# Patient Record
Sex: FEMALE | Race: WHITE | NOT HISPANIC OR LATINO | Employment: FULL TIME | URBAN - NONMETROPOLITAN AREA
[De-identification: names, ages, dates, MRNs, and addresses within clinical notes are randomized per-mention and may not be internally consistent; named-entity substitution may affect disease eponyms.]

---

## 2018-10-18 ENCOUNTER — OFFICE VISIT CONVERTED (OUTPATIENT)
Dept: FAMILY MEDICINE CLINIC | Age: 53
End: 2018-10-18
Attending: FAMILY MEDICINE

## 2018-11-01 ENCOUNTER — OFFICE VISIT CONVERTED (OUTPATIENT)
Dept: FAMILY MEDICINE CLINIC | Age: 53
End: 2018-11-01
Attending: FAMILY MEDICINE

## 2018-11-14 ENCOUNTER — OFFICE VISIT CONVERTED (OUTPATIENT)
Dept: FAMILY MEDICINE CLINIC | Age: 53
End: 2018-11-14
Attending: FAMILY MEDICINE

## 2019-03-12 ENCOUNTER — OFFICE VISIT CONVERTED (OUTPATIENT)
Dept: FAMILY MEDICINE CLINIC | Age: 54
End: 2019-03-12
Attending: NURSE PRACTITIONER

## 2019-03-12 ENCOUNTER — HOSPITAL ENCOUNTER (OUTPATIENT)
Dept: OTHER | Facility: HOSPITAL | Age: 54
Discharge: HOME | End: 2019-03-12
Attending: NURSE PRACTITIONER

## 2019-03-21 ENCOUNTER — OFFICE VISIT CONVERTED (OUTPATIENT)
Dept: PODIATRY | Facility: CLINIC | Age: 54
End: 2019-03-21
Attending: PODIATRIST

## 2019-05-17 ENCOUNTER — OFFICE VISIT CONVERTED (OUTPATIENT)
Dept: FAMILY MEDICINE CLINIC | Age: 54
End: 2019-05-17
Attending: NURSE PRACTITIONER

## 2019-06-12 ENCOUNTER — HOSPITAL ENCOUNTER (OUTPATIENT)
Dept: OTHER | Facility: HOSPITAL | Age: 54
Discharge: HOME | End: 2019-06-12
Attending: FAMILY MEDICINE

## 2019-06-12 LAB
CHOLEST SERPL-MCNC: 166 MG/DL (ref 107–200)
CHOLEST/HDLC SERPL: 3.3 {RATIO} (ref 3–6)
HDLC SERPL-MCNC: 50 MG/DL (ref 40–60)
LDLC SERPL CALC-MCNC: 93 MG/DL (ref 70–100)
TRIGL SERPL-MCNC: 114 MG/DL (ref 40–150)
VLDLC SERPL-MCNC: 23 MG/DL (ref 5–37)

## 2019-09-24 ENCOUNTER — OFFICE VISIT CONVERTED (OUTPATIENT)
Dept: FAMILY MEDICINE CLINIC | Age: 54
End: 2019-09-24
Attending: NURSE PRACTITIONER

## 2020-01-15 ENCOUNTER — OFFICE VISIT CONVERTED (OUTPATIENT)
Dept: FAMILY MEDICINE CLINIC | Age: 55
End: 2020-01-15
Attending: FAMILY MEDICINE

## 2020-01-15 ENCOUNTER — HOSPITAL ENCOUNTER (OUTPATIENT)
Dept: OTHER | Facility: HOSPITAL | Age: 55
Discharge: HOME | End: 2020-01-15
Attending: FAMILY MEDICINE

## 2020-01-15 LAB
APPEARANCE UR: CLEAR
BACTERIA UR QL AUTO: ABNORMAL
BILIRUB UR QL: NEGATIVE
CASTS URNS QL MICRO: ABNORMAL /[LPF]
COLOR UR: ABNORMAL
CONV LEUKOCYTE ESTERASE: NEGATIVE
CONV UROBILINOGEN IN URINE BY AUTOMATED TEST STRIP: 0.2 {EHRLICHU}/DL (ref 0.1–1)
EPI CELLS #/AREA URNS HPF: ABNORMAL /[HPF]
GLUCOSE 24H UR-MCNC: NEGATIVE MG/DL
HGB UR QL STRIP: NEGATIVE
KETONES UR QL STRIP: NEGATIVE MG/DL
MUCOUS THREADS URNS QL MICRO: ABNORMAL
NITRITE UR-MCNC: NEGATIVE MG/ML
PH UR STRIP.AUTO: 5.5 [PH] (ref 5–8)
PROT UR-MCNC: NEGATIVE MG/DL
RBC # BLD AUTO: ABNORMAL /[HPF]
SP GR UR STRIP: 1.02 (ref 1–1.03)
SPECIMEN SOURCE: ABNORMAL
UNIDENT CRYS URNS QL MICRO: ABNORMAL /[HPF]
WBC #/AREA URNS HPF: ABNORMAL /[HPF]

## 2020-01-31 ENCOUNTER — OFFICE VISIT CONVERTED (OUTPATIENT)
Dept: FAMILY MEDICINE CLINIC | Age: 55
End: 2020-01-31
Attending: NURSE PRACTITIONER

## 2020-11-25 ENCOUNTER — OFFICE VISIT CONVERTED (OUTPATIENT)
Dept: FAMILY MEDICINE CLINIC | Age: 55
End: 2020-11-25
Attending: NURSE PRACTITIONER

## 2020-11-25 ENCOUNTER — HOSPITAL ENCOUNTER (OUTPATIENT)
Dept: OTHER | Facility: HOSPITAL | Age: 55
Discharge: HOME | End: 2020-11-25
Attending: NURSE PRACTITIONER

## 2020-11-25 LAB
ALBUMIN SERPL-MCNC: 4.2 G/DL (ref 3.5–5)
ALBUMIN/GLOB SERPL: 1.4 {RATIO} (ref 1.4–2.6)
ALP SERPL-CCNC: 66 U/L (ref 53–141)
ALT SERPL-CCNC: 17 U/L (ref 10–40)
ANION GAP SERPL CALC-SCNC: 14 MMOL/L (ref 8–19)
AST SERPL-CCNC: 22 U/L (ref 15–50)
BASOPHILS # BLD MANUAL: 0.09 10*3/UL (ref 0–0.2)
BASOPHILS NFR BLD MANUAL: 1.3 % (ref 0–3)
BILIRUB SERPL-MCNC: 1.03 MG/DL (ref 0.2–1.3)
BUN SERPL-MCNC: 11 MG/DL (ref 5–25)
BUN/CREAT SERPL: 12 {RATIO} (ref 6–20)
CALCIUM SERPL-MCNC: 9.4 MG/DL (ref 8.7–10.4)
CHLORIDE SERPL-SCNC: 105 MMOL/L (ref 99–111)
CHOLEST SERPL-MCNC: 221 MG/DL (ref 107–200)
CHOLEST/HDLC SERPL: 4.2 {RATIO} (ref 3–6)
CONV CO2: 26 MMOL/L (ref 22–32)
CONV TOTAL PROTEIN: 7.2 G/DL (ref 6.3–8.2)
CREAT UR-MCNC: 0.89 MG/DL (ref 0.5–0.9)
DEPRECATED RDW RBC AUTO: 43.7 FL
EOSINOPHIL # BLD MANUAL: 0.32 10*3/UL (ref 0–0.7)
EOSINOPHIL NFR BLD MANUAL: 4.5 % (ref 0–7)
ERYTHROCYTE [DISTWIDTH] IN BLOOD BY AUTOMATED COUNT: 13.2 % (ref 11.5–14.5)
GFR SERPLBLD BASED ON 1.73 SQ M-ARVRAT: >60 ML/MIN/{1.73_M2}
GLOBULIN UR ELPH-MCNC: 3 G/DL (ref 2–3.5)
GLUCOSE SERPL-MCNC: 92 MG/DL (ref 65–99)
GRANS (ABSOLUTE): 3.52 10*3/UL (ref 2–8)
GRANS: 49.7 % (ref 30–85)
HBA1C MFR BLD: 14.5 G/DL (ref 12–16)
HCT VFR BLD AUTO: 43.6 % (ref 37–47)
HDLC SERPL-MCNC: 53 MG/DL (ref 40–60)
IMM GRANULOCYTES # BLD: 0 10*3/UL (ref 0–0.54)
IMM GRANULOCYTES NFR BLD: 0 % (ref 0–0.43)
LDLC SERPL CALC-MCNC: 145 MG/DL (ref 70–100)
LYMPHOCYTES # BLD MANUAL: 2.57 10*3/UL (ref 1–5)
LYMPHOCYTES NFR BLD MANUAL: 8.2 % (ref 3–10)
MCH RBC QN AUTO: 29.7 PG (ref 27–31)
MCHC RBC AUTO-ENTMCNC: 33.3 G/DL (ref 33–37)
MCV RBC AUTO: 89.3 FL (ref 81–99)
MONOCYTES # BLD AUTO: 0.58 10*3/UL (ref 0.2–1.2)
OSMOLALITY SERPL CALC.SUM OF ELEC: 291 MOSM/KG (ref 273–304)
PLATELET # BLD AUTO: 305 10*3/UL (ref 130–400)
PMV BLD AUTO: 9.8 FL (ref 7.4–10.4)
POTASSIUM SERPL-SCNC: 4.3 MMOL/L (ref 3.5–5.3)
RBC # BLD AUTO: 4.88 10*6/UL (ref 4.2–5.4)
SODIUM SERPL-SCNC: 141 MMOL/L (ref 135–147)
TRIGL SERPL-MCNC: 114 MG/DL (ref 40–150)
TSH SERPL-ACNC: 1.1 M[IU]/L (ref 0.27–4.2)
VARIANT LYMPHS NFR BLD MANUAL: 36.3 % (ref 20–45)
VLDLC SERPL-MCNC: 23 MG/DL (ref 5–37)
WBC # BLD AUTO: 7.08 10*3/UL (ref 4.8–10.8)

## 2020-11-26 LAB — HCV AB SER DONR QL: <0.1 S/CO RATIO (ref 0–0.9)

## 2020-12-09 ENCOUNTER — OFFICE VISIT CONVERTED (OUTPATIENT)
Dept: FAMILY MEDICINE CLINIC | Age: 55
End: 2020-12-09
Attending: NURSE PRACTITIONER

## 2020-12-09 ENCOUNTER — HOSPITAL ENCOUNTER (OUTPATIENT)
Dept: PHYSICAL THERAPY | Facility: CLINIC | Age: 55
Setting detail: RECURRING SERIES
Discharge: HOME | End: 2020-12-30
Attending: NURSE PRACTITIONER

## 2020-12-09 ENCOUNTER — HOSPITAL ENCOUNTER (OUTPATIENT)
Dept: OTHER | Facility: HOSPITAL | Age: 55
Discharge: HOME | End: 2020-12-09
Attending: NURSE PRACTITIONER

## 2020-12-13 LAB
CONV LAST MENSTURAL PERIOD: NORMAL
SPECIMEN SOURCE: NORMAL
SPECIMEN SOURCE: NORMAL
THIN PREP CVX: NORMAL

## 2021-04-09 ENCOUNTER — HOSPITAL ENCOUNTER (OUTPATIENT)
Dept: OTHER | Facility: HOSPITAL | Age: 56
Discharge: HOME | End: 2021-04-09
Attending: NURSE PRACTITIONER

## 2021-04-09 ENCOUNTER — OFFICE VISIT CONVERTED (OUTPATIENT)
Dept: FAMILY MEDICINE CLINIC | Age: 56
End: 2021-04-09
Attending: NURSE PRACTITIONER

## 2021-04-09 LAB
ALBUMIN SERPL-MCNC: 4 G/DL (ref 3.5–5)
ALBUMIN/GLOB SERPL: 1.3 {RATIO} (ref 1.4–2.6)
ALP SERPL-CCNC: 70 U/L (ref 53–141)
ALT SERPL-CCNC: 32 U/L (ref 10–40)
ANION GAP SERPL CALC-SCNC: 17 MMOL/L (ref 8–19)
APPEARANCE UR: CLEAR
AST SERPL-CCNC: 31 U/L (ref 15–50)
BACTERIA UR QL AUTO: ABNORMAL
BILIRUB SERPL-MCNC: 0.59 MG/DL (ref 0.2–1.3)
BILIRUB UR QL: NEGATIVE
BUN SERPL-MCNC: 14 MG/DL (ref 5–25)
BUN/CREAT SERPL: 17 {RATIO} (ref 6–20)
CALCIUM SERPL-MCNC: 9.1 MG/DL (ref 8.7–10.4)
CASTS URNS QL MICRO: ABNORMAL /[LPF]
CHLORIDE SERPL-SCNC: 107 MMOL/L (ref 99–111)
COLOR UR: YELLOW
CONV CO2: 22 MMOL/L (ref 22–32)
CONV LEUKOCYTE ESTERASE: ABNORMAL
CONV TOTAL PROTEIN: 7 G/DL (ref 6.3–8.2)
CONV UROBILINOGEN IN URINE BY AUTOMATED TEST STRIP: 0.2 {EHRLICHU}/DL (ref 0.1–1)
CREAT UR-MCNC: 0.84 MG/DL (ref 0.5–0.9)
EPI CELLS #/AREA URNS HPF: ABNORMAL /[HPF]
GFR SERPLBLD BASED ON 1.73 SQ M-ARVRAT: >60 ML/MIN/{1.73_M2}
GLOBULIN UR ELPH-MCNC: 3 G/DL (ref 2–3.5)
GLUCOSE 24H UR-MCNC: NEGATIVE MG/DL
GLUCOSE SERPL-MCNC: 86 MG/DL (ref 65–99)
HGB UR QL STRIP: NEGATIVE
KETONES UR QL STRIP: NEGATIVE MG/DL
MUCOUS THREADS URNS QL MICRO: ABNORMAL
NITRITE UR-MCNC: NEGATIVE MG/ML
OSMOLALITY SERPL CALC.SUM OF ELEC: 294 MOSM/KG (ref 273–304)
PH UR STRIP.AUTO: 6.5 [PH] (ref 5–8)
POTASSIUM SERPL-SCNC: 4 MMOL/L (ref 3.5–5.3)
PROT UR-MCNC: NEGATIVE MG/DL
RBC # BLD AUTO: ABNORMAL /[HPF]
SODIUM SERPL-SCNC: 142 MMOL/L (ref 135–147)
SP GR UR STRIP: 1.02 (ref 1–1.03)
SPECIMEN SOURCE: ABNORMAL
UNIDENT CRYS URNS QL MICRO: ABNORMAL /[HPF]
WBC #/AREA URNS HPF: ABNORMAL /[HPF]

## 2021-04-11 LAB — BACTERIA UR CULT: NORMAL

## 2021-04-14 ENCOUNTER — HOSPITAL ENCOUNTER (OUTPATIENT)
Dept: OTHER | Facility: HOSPITAL | Age: 56
Discharge: HOME | End: 2021-04-14
Attending: NURSE PRACTITIONER

## 2021-04-14 LAB
APPEARANCE UR: ABNORMAL
BACTERIA UR QL AUTO: ABNORMAL
BILIRUB UR QL: NEGATIVE
CASTS URNS QL MICRO: ABNORMAL /[LPF]
COLOR UR: YELLOW
CONV LEUKOCYTE ESTERASE: ABNORMAL
CONV UROBILINOGEN IN URINE BY AUTOMATED TEST STRIP: 0.2 {EHRLICHU}/DL (ref 0.1–1)
EPI CELLS #/AREA URNS HPF: ABNORMAL /[HPF]
GLUCOSE 24H UR-MCNC: NEGATIVE MG/DL
HGB UR QL STRIP: NEGATIVE
KETONES UR QL STRIP: NEGATIVE MG/DL
MUCOUS THREADS URNS QL MICRO: ABNORMAL
NITRITE UR-MCNC: NEGATIVE MG/ML
PH UR STRIP.AUTO: 7 [PH] (ref 5–8)
PROT UR-MCNC: NEGATIVE MG/DL
RBC # BLD AUTO: ABNORMAL /[HPF]
SP GR UR STRIP: 1.02 (ref 1–1.03)
SPECIMEN SOURCE: ABNORMAL
UNIDENT CRYS URNS QL MICRO: ABNORMAL /[HPF]
WBC #/AREA URNS HPF: ABNORMAL /[HPF]

## 2021-04-16 LAB — BACTERIA UR CULT: NORMAL

## 2021-05-15 VITALS
HEART RATE: 78 BPM | HEIGHT: 66 IN | WEIGHT: 265 LBS | BODY MASS INDEX: 42.59 KG/M2 | DIASTOLIC BLOOD PRESSURE: 90 MMHG | SYSTOLIC BLOOD PRESSURE: 152 MMHG | OXYGEN SATURATION: 97 %

## 2021-05-18 NOTE — PROGRESS NOTES
Lissette Saavedra 1965     Office/Outpatient Visit    Visit Date: Wed, Nov 14, 2018 11:43 am    Provider: Chava العراقي,   (Assistant: Saurabh Whittaker)    Location: [facility]        Electronically signed by Chava العراقي MD on  11/20/2018 03:58:42 PM                             SUBJECTIVE:        CC:     Ms. Saavedra is a 53 year old White female.  This is a follow-up visit.  still has a cough;         HPI:     Pt reports she still has a cough. Last visit was about 2 weeks ago and she had the cough for a couple weeks then. It started not long after she started taking lisinopril so she was switched to losartan and also started on claritan. Cough has not changed since then. Its a dry cough, feels as if she has a drainage but nothing comes up when coughing. No sinus or nasal congestion, still has an itchy throat. She feels a popping in her ears. No fever. Total duration is 4 weeks. She has seasonal allergies.     Difficulty sleeping, still anxious. She thought her  was going to be released from [facility] last Friday but then was not. She is eager to see him again and was disappointed he has not been released.     ROS:     CONSTITUTIONAL:  Negative for fatigue and fever.      EYES:  Negative for blurred vision.      E/N/T:  Negative for diminished hearing and nasal congestion.      CARDIOVASCULAR:  Negative for chest pain and palpitations.      RESPIRATORY:  Positive for persistent cough ( typically dry ).   Negative for dyspnea.      GASTROINTESTINAL:  Negative for abdominal pain, constipation, diarrhea, nausea and vomiting.      GENITOURINARY:  Negative for dysuria and urinary incontinence.      MUSCULOSKELETAL:  Negative for arthralgias and myalgias.      INTEGUMENTARY/BREAST:  Negative for atypical mole(s) and rash.      NEUROLOGICAL:  Negative for paresthesias and weakness.      PSYCHIATRIC:  Negative for anxiety, depression and sleep disturbance.          PMH/FMH/SH:     Last Reviewed on  10/18/2018 10:31 AM by Xin Nguyen    Past Medical History:                 PAST MEDICAL HISTORY         Allergies    Hyperlipidemia    Hypertension         Surgical History:         Cholecystectomy: laparoscopic; About 2008;         Family History:         Positive for Coronary Artery Disease ( father; mother ).      Positive for Alzheimer's Disease ( father ).      Positive for Type 2 Diabetes ( father ).      Positive for Osteoarthritis ( mother ).          Social History:     Occupation: [employer]     Marital Status:      Children: 2 children and 1 step-child     Ms. Saavedra denies any current form of exercise.          Tobacco/Alcohol/Supplements:     Last Reviewed on 11/01/2018 02:57 PM by Saurabh Whittaker    Tobacco: She has never smoked.  Non-drinker         Substance Abuse History:     Last Reviewed on 10/18/2018 10:31 AM by Xin Nguyen    None         Mental Health History:     Last Reviewed on 10/18/2018 10:31 AM by Xin Nguyen        Communicable Diseases (eg STDs):     Last Reviewed on 10/18/2018 10:31 AM by Xin Nguyen            Current Problems:     Last Reviewed on 10/18/2018 10:31 AM by Xin Nguyen    Benign HTN     Major depression, recurrent episode, moderate     Cough     Shoulder pain         Immunizations:     Fluzone Quadrivalent (3+ years) 10/18/2018         Allergies:     Last Reviewed on 11/01/2018 02:57 PM by Saurabh Whittaker    Aspirin (ASA):    ACE Inhibitors: cough    Melons:        Current Medications:     Last Reviewed on 11/01/2018 02:58 PM by Suarabh Whittaker    Atorvastatin Calcium 40mg Tablet 1 tab hs     Claritin 10mg Tablet 1 tab(s) by mouth daily     Losartan 50mg Tablet 1 tab daily     Prozac 10mg Capsules Take 1 tab PO qd for 2 weeks; then 2 tabs qAM thereafter.         OBJECTIVE:        Vitals:         Current: 11/14/2018 11:47:52 AM    Ht:  5 ft, 6.5 in;  Wt: 265.2 lbs;  BMI: 42.2    T: 97.9 F (oral);  BP: 126/68 mm Hg (left arm, sitting);  P: 75  bpm (left arm (BP Cuff), sitting);  sCr: 0.85 mg/dL;  GFR: 98.25        Exams:     PHYSICAL EXAM:     GENERAL: vital signs recorded - well developed, well nourished;  well groomed;  no apparent distress;     EYES: extraocular movements intact; conjunctiva and cornea are normal; PERRLA;     E/N/T: OROPHARYNX:  normal mucosa, dentition, gingiva, and posterior pharynx;     NECK: range of motion is normal; thyroid exam reveals not enlarged;     RESPIRATORY: normal respiratory rate and pattern with no distress; normal breath sounds with no rales, rhonchi, wheezes or rubs;     CARDIOVASCULAR: normal rate; rhythm is regular;  no systolic murmur; no edema;     GASTROINTESTINAL: nontender; normal bowel sounds;     MUSCULOSKELETAL: normal gait; normal overall tone     NEUROLOGIC: mental status: alert and oriented x 3;     PSYCHIATRIC:  appropriate affect and demeanor; normal speech pattern; grossly normal memory;         ASSESSMENT           786.2   R05  Cough              DDx:     296.32   F33.1  Major depression, recurrent episode, moderate              DDx:         ORDERS:         Meds Prescribed:       Singulair  (Montelukast Sodium) 10mg Tablet Take 1 tablet(s) by mouth each evening  #30 (Thirty) tablet(s) Refills: 2       Flonase Allergy Relief (Fluticasone Propionate) 50mcg/1actuation Nasal Spray 1 spray eac nostril once daily x 2 weeks then once daily prn allergy symptoms  #16 (Sixteen) ml Refills: 2       Refill of: Prozac (Fluoxetine) 40mg Capsules Take 1 capsule(s) by mouth daily  #30 (Thirty) capsule(s) Refills: 0       Omeprazole 20mg Capsules, Extended Release 1 capsule daily  #30 (Thirty) capsule(s) Refills: 0                 PLAN:          Cough Presentation c/w prolonged viral vs allergic URI. Possible GERD component and pt started on omeprazole. Will consider CXR if cough persists.           Prescriptions:       Singulair  (Montelukast Sodium) 10mg Tablet Take 1 tablet(s) by mouth each evening  #30 (Thirty)  tablet(s) Refills: 2       Flonase Allergy Relief (Fluticasone Propionate) 50mcg/1actuation Nasal Spray 1 spray eac nostril once daily x 2 weeks then once daily prn allergy symptoms  #16 (Sixteen) ml Refills: 2       Omeprazole 20mg Capsules, Extended Release 1 capsule daily  #30 (Thirty) capsule(s) Refills: 0          Major depression, recurrent episode, moderate Prozac increased to 40 mg qd to help with anxiety.           Prescriptions:       Refill of: Prozac (Fluoxetine) 40mg Capsules Take 1 capsule(s) by mouth daily  #30 (Thirty) capsule(s) Refills: 0           Patient Education Handouts:       Depression (Depressive Disorder)              CHARGE CAPTURE           **Please note: ICD descriptions below are intended for billing purposes only and may not represent clinical diagnoses**        Primary Diagnosis:         786.2 Cough            R05    Cough              Orders:          [id number]   Office/outpatient visit; established patient, level 4  (In-House)           296.32 Major depression, recurrent episode, moderate            F33.1    Major depressive disorder, recurrent, moderate

## 2021-05-18 NOTE — PROGRESS NOTES
Lissette Saavedra. 1965     Office/Outpatient Visit    Visit Date: Tue, Mar 12, 2019 05:14 pm    Provider: Mattie Ontiveros N.P. (Assistant: Sarah Spurling, MA)    Location: [facility]        Electronically signed by Mattie Ontiveros N.P. on  03/16/2019 12:29:06 PM                             SUBJECTIVE:        CC:     Ms. Saavedra is a 53 year old White female.  Right foot pain, it's hard for her to walk on it. The pain is coming from the outside. She has had surgery on both of her feet, but the right foot pain started 3 weeks ago.;         HPI:         Patient complains of foot pain.  Today's visit is for evaluation of the right foot.  The pain initially began 3 weeks ago.  No precipitating event or injury is identified.  She characterizes the pain as throbbing.  Associated symptoms include occcasional swelling.  Palliative factors include rest, non weighbearing.  Other details: had plantar fasciits with surgery to correct about 20 + years ago.      ROS:     CONSTITUTIONAL:  Negative for chills, fatigue, fever, and weight change.      CARDIOVASCULAR:  Negative for chest pain, orthopnea, paroxysmal nocturnal dyspnea and pedal edema.      RESPIRATORY:  Negative for dyspnea.      MUSCULOSKELETAL:  Positive for limb pain ( right foot - lateral dorsal ).      NEUROLOGICAL:  Negative for paresthesias and weakness.          [facility]/FM/:     Last Reviewed on 10/18/2018 10:31 AM by Xin Nguyen    Past Medical History:                 PAST MEDICAL HISTORY         Allergies    Hyperlipidemia    Hypertension         Surgical History:         Cholecystectomy: laparoscopic; About 2008;         Family History:         Positive for Coronary Artery Disease ( father; mother ).      Positive for Alzheimer's Disease ( father ).      Positive for Type 2 Diabetes ( father ).      Positive for Osteoarthritis ( mother ).          Social History:     Occupation: [employer]     Marital Status:      Children: 2  children and 1 step-child     Ms. Saavedra denies any current form of exercise.          Tobacco/Alcohol/Supplements:     Last Reviewed on 11/14/2018 11:45 AM by Saurabh Whittaker    Tobacco: She has never smoked.  Non-drinker         Substance Abuse History:     Last Reviewed on 10/18/2018 10:31 AM by Xin Nguyen    None         Mental Health History:     Last Reviewed on 10/18/2018 10:31 AM by Xin Nguyen        Communicable Diseases (eg STDs):     Last Reviewed on 10/18/2018 10:31 AM by Xin Nguyen            Current Problems:     Last Reviewed on 10/18/2018 10:31 AM by Xin Nguyen    Hyperlipidemia     Benign HTN     Major depression, recurrent episode, moderate     Cough         Immunizations:     Fluzone Quadrivalent (3+ years) 10/18/2018         Allergies:     Last Reviewed on 11/14/2018 11:45 AM by Saurabh Whittaker    Aspirin (ASA):    ACE Inhibitors: cough    Melons:        Current Medications:     Last Reviewed on 11/14/2018 11:45 AM by Saurabh Whittaker    Prozac 40mg Capsules Take 1 capsule(s) by mouth daily     Singulair  10mg Tablet Take 1 tablet(s) by mouth each evening     Atorvastatin Calcium 40mg Tablet 1 tab hs     Claritin 10mg Tablet 1 tab(s) by mouth daily         OBJECTIVE:        Vitals:         Current: 3/12/2019 5:20:14 PM    Ht:  5 ft, 6.5 in;  Wt: 266.4 lbs;  BMI: 42.4    T: 97.6 F (oral);  BP: 148/74 mm Hg (right arm, sitting);  P: 83 bpm (right arm (BP Cuff), sitting);  sCr: 0.85 mg/dL;  GFR: 98.44        Exams:     PHYSICAL EXAM:     GENERAL: vital signs recorded - well developed, well nourished;  no apparent distress;     NECK: range of motion is normal; thyroid is non-palpable;     RESPIRATORY: normal respiratory rate and pattern with no distress; normal breath sounds with no rales, rhonchi, wheezes or rubs;     CARDIOVASCULAR: normal rate; rhythm is regular;  no systolic murmur; no edema;     MUSCULOSKELETAL: gait: affected by a right leg limp;  pain with range of  motion in: right ankle extension;     NEUROLOGICAL:  cranial nerves, motor and sensory function, reflexes, gait and coordination are all intact;     PSYCHIATRIC:  appropriate affect and demeanor; normal speech pattern; grossly normal memory;         ASSESSMENT:           729.5   M79.671  Foot pain              DDx:         ORDERS:         Meds Prescribed:       Diclofenac Sodium 75mg Tablets, Enteric Coated 1 tab bid with food  #30 (Thirty) tablet(s) Refills: 2         Radiology/Test Orders:       [id number]  Right radiologic examination, foot; complete, minimum of three views  (Send-Out)                   PLAN:          Foot pain will get xray, may need referral to podiatry / PT  - good supportive shoes encouraged, epsom salt soaks         RADIOLOGY:  I have ordered a right foot x-ray to be done today.            Prescriptions:       Diclofenac Sodium 75mg Tablets, Enteric Coated 1 tab bid with food  #30 (Thirty) tablet(s) Refills: 2           Orders:       [id number]  Right radiologic examination, foot; complete, minimum of three views  (Send-Out)               CHARGE CAPTURE:           Primary Diagnosis:     729.5 Foot pain            M79.671    Pain in right foot              Orders:          [id number]   Office/outpatient visit; established patient, level 3  (In-House)               ADDENDUMS:      ____________________________________    Addendum: 03/14/2019 10:32 AM - [address]         Visit Note Faxed to:        Fadi Markham  (Podiatry); Number [phone]

## 2021-05-18 NOTE — PROGRESS NOTES
Lissette Saavedra  1965     Office/Outpatient Visit    Visit Date: Fri, Jan 31, 2020 04:33 pm    Provider: Mattie Ontiveros N.P. (Assistant: Leda Prince MA)    Location: [facility]        Electronically signed by Mattie Ontiveros N.P. on  01/31/2020 05:24:15 PM                             Subjective:        CC: Mrs. Saavedra is a 54 year old White female.  varicose vein in left leg, painful and warm to the touch; PT STATES SHE ISNT TAKING THE ATORVASTATIN         HPI:       pt reports not taking atrovastatin at this time. Had a dental partial made and had dysphagia afterwards, dentist suggusted that it could be the result of a medication, so she stopped all meds and still had dysphagia          Complaint of varicose veins of left lower extremity with inflammation..  The symptom began 3 days ago.  The severity is of moderate intensity.  The typical duration of an episode is the majority of the day.  Associated symptoms include itching, burning, painful to touch.            PHQ-9 Depression Screening: Completed form scanned and in chart; Total Score 5     ROS:     CONSTITUTIONAL:  Negative for chills, fatigue, fever, and weight change.      CARDIOVASCULAR:  Positive for varicose veins ( large bulging peripheral varicosities; large painful peripheral varicosities ).   Negative for chest pain, orthopnea, paroxysmal nocturnal dyspnea or pedal edema.      RESPIRATORY:  Negative for dyspnea.      GASTROINTESTINAL:  Negative for abdominal pain, constipation, diarrhea, nausea and vomiting.      PSYCHIATRIC:  Negative for anxiety, depression, and sleep disturbances.          Past Medical History / Family History / Social History:         Last Reviewed on 1/15/2020 02:35 PM by Chava العراقي    Past Medical History:                 PAST MEDICAL HISTORY         Allergies    Hyperlipidemia    Hypertension         PREVENTIVE HEALTH MAINTENANCE             EYE EXAM: was last done been some time      MAMMOGRAM: was last done long time The next one is due  now     PAP SMEAR: was last done been some time no previous abnormals         Surgical History:         Cholecystectomy: laparoscopic; About 2008;         Family History:         Positive for Coronary Artery Disease ( father; mother ).      Positive for Alzheimer's Disease ( father ).      Positive for Type 2 Diabetes ( father ).      Positive for Osteoarthritis ( mother ).          Social History:     Occupation: [employer]     Marital Status:      Children: 2 children and 1 step-child     Ms. Saavedra denies any current form of exercise.          Tobacco/Alcohol/Supplements:     Last Reviewed on 1/31/2020 04:34 PM by Leda Prince    Tobacco: She has never smoked.  Non-drinker         Substance Abuse History:     Last Reviewed on 1/15/2020 02:35 PM by Chava العراقي    None         Mental Health History:     Last Reviewed on 1/15/2020 02:35 PM by Chava العراقي        Communicable Diseases (eg STDs):     Last Reviewed on 1/15/2020 02:35 PM by Chava العراقي        Current Problems:     Last Reviewed on 1/15/2020 02:35 PM by Chava العراقي    Essential (primary) hypertension    Cough    Hyperlipidemia, unspecified    Encounter for screening for malignant neoplasm of colon    Influenza due to identified novel influenza A virus with other respiratory manifestations    Cellulitis of left lower limb    Varicose veins of left lower extremity with inflammation    Encounter for screening for depression    Low back pain        Immunizations:     Fluzone Quadrivalent (3+ years) 10/18/2018        Allergies:     Last Reviewed on 1/31/2020 04:34 PM by Leda Prince    Aspirin (ASA):      Melons:      ACE Inhibitors: cough         Current Medications:     Last Reviewed on 1/31/2020 04:34 PM by Leda Prince    Atorvastatin Calcium 40mg Tablet [1 tab hs]    sertraline 50 mg oral tablet [1/2 a day for first week then one a day]        Objective:        Vitals:          Current: 1/31/2020 4:39:55 PM    Ht:  5 ft, 6.5 in;  Wt: 259.6 lbs;  BMI: 41.3T: 98.6 F (oral);  BP: 135/77 mm Hg (right arm, sitting);  P: 92 bpm (right arm (BP Cuff), sitting);  sCr: 0.85 mg/dL;  GFR: 96.26        Exams:     PHYSICAL EXAM:     GENERAL: vital signs recorded - well developed, well nourished;  no apparent distress;     RESPIRATORY: normal appearance and symmetric expansion of chest wall; normal respiratory rate and pattern with no distress; normal breath sounds with no rales, rhonchi, wheezes or rubs;     CARDIOVASCULAR: normal rate; rhythm is regular;  no edema; bulging varicosities; left inner thigh just above the knee;     GASTROINTESTINAL: nontender; normal bowel sounds; no organomegaly;     MUSCULOSKELETAL: normal gait; normal range of motion of all major muscle groups; no limb or joint pain with range of motion;     PSYCHIATRIC: appropriate affect and demeanor; normal speech pattern; normal thought and perception;         Assessment:         E78.5   Hyperlipidemia, unspecified       I83.12   Varicose veins of left lower extremity with inflammation       L03.116   Cellulitis of left lower limb       Z13.31   Encounter for screening for depression           ORDERS:         Meds Prescribed:       [New Rx] Keflex 500 mg oral capsule [take 1 capsule (500 mg) by oral route 4 times per day for 7 days], #28 (twenty eight) capsules, Refills: 0 (zero)         Other Orders:         Depression screen negative  (In-House)                      Plan:         Hyperlipidemia, unspecifiedEncourage patient to follow heart [employer] diet and return when she has insurance resolved and will assess lipid panel.         Varicose veins of left lower extremity with inflammationRecommended patient to go the [place] [facility] for evaluation of possible thrombus in superficial vein.         Cellulitis of left lower limb          Prescriptions:       [New Rx] Keflex 500 mg oral capsule [take 1 capsule (500 mg) by oral route 4  times per day for 7 days], #28 (twenty eight) capsules, Refills: 0 (zero)         Encounter for screening for depression    MIPS PHQ-9 Depression Screening: Completed form scanned and in chart; Total Score 5; Negative Depression Screen           Orders:         Depression screen negative  (In-House)                  Charge Capture:         Primary Diagnosis:     E78.5  Hyperlipidemia, unspecified           Orders:      [id number]  Office/outpatient visit; established patient, level 3  (In-House)              I83.12  Varicose veins of left lower extremity with inflammation     L03.116  Cellulitis of left lower limb     Z13.31  Encounter for screening for depression           Orders:        Depression screen negative  (In-House)

## 2021-05-18 NOTE — PROGRESS NOTES
Lissette Saavedra 1965     Office/Outpatient Visit    Visit Date: Tue, Sep 24, 2019 05:28 pm    Provider: Mattie Ontiveros N.P. (Assistant: Sarah Spurling, MA)    Location: [facility]        Electronically signed by Mattie Ontiveros N.P. on  09/25/2019 01:27:37 PM                             SUBJECTIVE:        CC:     Mrs. Saavedra is a 54 year old White female.  Pt is here to follow up/med refills. wants to discuss prozac;         HPI:         Mrs. Saavedra presents with major depression, recurrent episode, moderate.  Visit today is because of worsening symptoms.  The diagnosis of depression was made several years ago.  This episode of depression has been present for the past several months.  Currently not on any antidepressants.  Current affective symptoms include anhedonia, altered sleep habits, crying spells, fatigue, sadness and loss of pleasure, staying home and isolation.  The symptoms are constant and overwhelming.  Other symptoms that the patient has been experiencing include slowed movements.  Presently, Mrs. Saavedra denies suicidal ideation.  Recent stressors include the death of her father and father passed away  now feels like nothing to do.          Dx with hyperlipidemia; compliance with treatment has been poor; she has not been taking her medications due to would like to be off medication   has not taken for 2 months.  She specifically denies associated symptoms, including muscle pain and weakness.  Most recent lab tests include Total Cholesterol:  272 (mg/dL) (10/18/2018),  166 (mg/dL) (06/12/2019), HDL:  50 (mg/dL) (10/18/2018),  50 (mg/dL) (06/12/2019), Triglycerides:  202 (mg/dL) (10/18/2018),  114 (mg/dL) (06/12/2019), LDL:  182 (mg/dL) (10/18/2018),  93 (mg/dL) (06/12/2019), VLDL Cholesterol:  40 (mg/dl) (10/18/2018),  23 (mg/dl) (06/12/2019), CHOL/HDLC RATIO:  5.4 (NA) (10/18/2018),  3.3 (NA) (06/12/2019).      ROS:     CONSTITUTIONAL:  Positive for fatigue and unintentional  weight gain.      CARDIOVASCULAR:  Negative for chest pain, orthopnea, paroxysmal nocturnal dyspnea and pedal edema.      RESPIRATORY:  Negative for dyspnea.      GASTROINTESTINAL:  Positive for acid reflux symptoms.   Negative for abdominal pain, constipation, diarrhea, heartburn, nausea or vomiting.      NEUROLOGICAL:  Negative for dizziness, headaches, paresthesias, and weakness.      PSYCHIATRIC:  Negative for anxiety, depression, and sleep disturbances.          PMH/FMH/SH:     Last Reviewed on 5/17/2019 11:54 AM by Mattie Ontiveros    Past Medical History:                 PAST MEDICAL HISTORY         Allergies    Hyperlipidemia    Hypertension         PREVENTIVE HEALTH MAINTENANCE             EYE EXAM: was last done been some time     MAMMOGRAM: was last done long time The next one is due  now     PAP SMEAR: was last done been some time no previous abnormals         Surgical History:         Cholecystectomy: laparoscopic; About 2008;         Family History:         Positive for Coronary Artery Disease ( father; mother ).      Positive for Alzheimer's Disease ( father ).      Positive for Type 2 Diabetes ( father ).      Positive for Osteoarthritis ( mother ).          Social History:     Occupation: [employer]     Marital Status:      Children: 2 children and 1 step-child     Ms. Saavedra denies any current form of exercise.          Tobacco/Alcohol/Supplements:     Last Reviewed on 9/24/2019 05:30 PM by Spurling, Sarah C    Tobacco: She has never smoked.  Non-drinker         Substance Abuse History:     Last Reviewed on 10/18/2018 10:31 AM by Xin Nguyen    None         Mental Health History:     Last Reviewed on 10/18/2018 10:31 AM by Xin Nguyen        Communicable Diseases (eg STDs):     Last Reviewed on 10/18/2018 10:31 AM by Xin Nguyen            Current Problems:     Last Reviewed on 5/17/2019 11:54 AM by Mattie Ontiveros    Screening for cancer of colon     Hyperlipidemia      Benign HTN     Major depression, recurrent episode, moderate     Screening mammogram - other     Cough         Immunizations:     Fluzone Quadrivalent (3+ years) 10/18/2018         Allergies:     Last Reviewed on 9/24/2019 05:30 PM by Spurling, Sarah C    Aspirin (ASA):    ACE Inhibitors: cough    Melons:        Current Medications:     Last Reviewed on 9/24/2019 05:31 PM by Spurling, Sarah C    Atorvastatin Calcium 40mg Tablet 1 tab hs         OBJECTIVE:        Vitals:         Current: 9/24/2019 5:33:38 PM    Ht:  5 ft, 6.5 in;  Wt: 271 lbs;  BMI: 43.1    T: 98.4 F (oral);  BP: 125/65 mm Hg (right arm, sitting);  P: 88 bpm (right arm (BP Cuff), sitting);  sCr: 0.85 mg/dL;  GFR: 98.04        Exams:     PHYSICAL EXAM:     GENERAL: vital signs recorded - well developed, well nourished;  no apparent distress, tearful;     NECK: range of motion is normal; thyroid is non-palpable;     RESPIRATORY: normal respiratory rate and pattern with no distress; normal breath sounds with no rales, rhonchi, wheezes or rubs;     CARDIOVASCULAR: normal rate; rhythm is regular;  no systolic murmur; no edema;     MUSCULOSKELETAL: normal gait; normal range of motion of all major muscle groups; no limb or joint pain with range of motion;     NEUROLOGICAL:  cranial nerves, motor and sensory function, reflexes, gait and coordination are all intact;     PSYCHIATRIC: affect/demeanor: depressed, tearful;         ASSESSMENT:           296.32   F33.1  Major depression, recurrent episode, moderate              DDx:     272.4   E78.5  Hyperlipidemia              DDx:     401.1   I10  Benign HTN              DDx:     V76.12   Z12.31  Screening mammogram - other              DDx:     V76.51   Z12.11  Screening for cancer of colon              DDx:         ORDERS:         Meds Prescribed:       Sertraline HCl 50mg Tablet 1/2 a day for first week then one a day  #30 (Thirty) tablet(s) Refills: 2         Radiology/Test Orders:       [id number]  Screening  mammography, bilateral (2-view film study of each breast)  (Send-Out)           Lab Orders:       [id number]  Cologuard colorectal screening lauren 10 DNA markers  (Send-Out)         FUTURE  Future order to be done at patients convenience  (Send-Out)         [id number]  [facility] [id number], [id number]  (Send-Out)                   PLAN:          Major depression, recurrent episode, moderate She will stop the prozac - and start this medication  I would like for her to follow up in 3-4 weeks to see if improvement - she will look at her scheduled to see when she is available - if worsening symptoms, in here sooner           Prescriptions:       Sertraline HCl 50mg Tablet 1/2 a day for first week then one a day  #30 (Thirty) tablet(s) Refills: 2          Hyperlipidemia Lissette would like to not take the medication - she would like to remain off of this medication until we check levels again in December.  I will have her off  - Her numbers did have large improvement when taking - but will look to see if she is able to make necessary dietary changes to improve          Benign HTN BP ok today - will defer resuming medication at this time.  Follow up in December         FOLLOW-UP TESTING #1: FOLLOW-UP LABORATORY:  Labs to be scheduled in the future include HTN/Lipid Panel: CMP, Lipid.            Orders:       FUTURE  Future order to be done at patients convenience  (Send-Out)         [id number]  [facility] CMP AND LIPID: [id number], [id number]  (Send-Out)            Screening mammogram - other         FOLLOW-UP TESTING #1:    RADIOLOGY:  I have ordered Mammogram Screening to be done today.            Orders:       [id number]  Screening mammography, bilateral (2-view film study of each breast)  (Send-Out)            Screening for cancer of colon     LABORATORY:  Labs ordered to be performed today include Cologuard Testing.            Orders:       [id number]  Cologuard colorectal screening aluren 10 DNA markers  (Send-Out)               Patient  Recommendations:        For  Benign HTN:             The following laboratory testing has been ordered:             CHARGE CAPTURE:           Primary Diagnosis:     296.32 Major depression, recurrent episode, moderate            F33.1    Major depressive disorder, recurrent, moderate              Orders:          [id number]   Office/outpatient visit; established patient, level 4  (In-House)           272.4 Hyperlipidemia            E78.5    Hyperlipidemia, unspecified    401.1 Benign HTN            I10    Essential (primary) hypertension    V76.12 Screening mammogram - other            Z12.31    Encounter for screening mammogram for malignant neoplasm of breast    V76.51 Screening for cancer of colon            Z12.11    Encounter for screening for malignant neoplasm of colon

## 2021-05-18 NOTE — PROGRESS NOTES
Lissette Saavedra  1965     Office/Outpatient Visit    Visit Date: Wed, Nov 25, 2020 11:36 am    Provider: Mattie Ontiveros N.P. (Assistant: Lo Holloway RN)    Location: [facility]        Electronically signed by Mattie Ontiveros N.P. on  11/30/2020 05:28:46 AM                             Subjective:        CC: Mrs. Saavedra is a 55 year old White female.  presents today due to Left knee pain- constant for months. HAS NOT BEEN TAKING THE SERTRALINE AND ATROVASTATIN-JUST GOT INSURANCE BACK         HPI:           In regard to the encounter for general adult medical examination without abnormal findings, she cannot recall when she last had a physical exam.  She is menopausal.  She is not currently using any form of contraception.  She performs breast self-exams occasionally.   Her last Pap smear was __ years ago (enter) years ago.   Her last mammogram was __ years ago (enter) years ago.   She's had vision screening done __ years ago (enter) years ago.   Preventative Health updated today.  She is not current with her influenza immunization.            Complaint of pain in left knee..  The symptom began several months ago.  The severity is of moderate intensity.  This is the first episode of this type of pain.  The typical duration of an episode is the majority of the day.  NO PRIOR INJURY     ROS:     CONSTITUTIONAL:  Negative for chills, fatigue and fever.      EYES:  Positive for use of glasses.      E/N/T:  Positive for diminished hearing.   Negative for ear pain.      CARDIOVASCULAR:  Negative for chest pain and pedal edema.      RESPIRATORY:  Negative for recent cough, dyspnea and frequent wheezing.      GASTROINTESTINAL:  Positive for diarrhea ( after GB out  been a problem since then ).   Negative for abdominal pain, constipation, heartburn, nausea or vomiting.      GENITOURINARY:  Positive for urinary incontinence urge incontinence.   Negative for dysuria or change in urine  stream.      MUSCULOSKELETAL:  Positive for joint stiffness (left knee, back /sciatica).   Negative for arthralgias or myalgias.      INTEGUMENTARY/BREAST:  Negative for atypical mole(s), pruritis and rash.      NEUROLOGICAL:  Positive for paresthesias.   Negative for dizziness.      ENDOCRINE:  Negative for hair loss, polydipsia and polyphagia.      ALLERGIC/IMMUNOLOGIC:  Positive for seasonal allergies.      PSYCHIATRIC:  Positive for anxiety, depression and waking frequently   - has been off medication.   Negative for suicidal thoughts.          Past Medical History / Family History / Social History:         Last Reviewed on 11/25/2020 11:50 AM by Mattie Ontiveros    Past Medical History:                 PAST MEDICAL HISTORY         Allergies    Hyperlipidemia    Hypertension         PREVENTIVE HEALTH MAINTENANCE             COLORECTAL CANCER SCREENING: Up to date (colonoscopy q10y; sigmoidoscopy q5y; Cologuard q3y) was last done 3/2019 - COLOGUARD  (DUE IN 3/2022); Cologuard normal     DENTAL CLEANING: was last done 2020 - [facility] / [facility]     EYE EXAM: was last done been some time     MAMMOGRAM: was last done long time     PAP SMEAR: was last done been some time no previous abnormals         PAST MEDICAL HISTORY             CURRENT MEDICAL PROVIDERS:    podiatry - Dr. Anders         Surgical History:         Cholecystectomy: laparoscopic; About 2008; Other Surgeries    uterine ablasion;    left forearm injury - skin graft in childhood;    bilateral foot surgery  (mortons neuroma left and plantar fasciitis right);         Family History:         Positive for Coronary Artery Disease ( father; mother ).      Positive for Alzheimer's Disease ( father ).      Positive for Type 2 Diabetes ( father ).      Positive for Osteoarthritis ( mother ).          Social History:     Occupation: [employer]     Marital Status:      Children: 2 children and 1 step-child     Ms. Saavedra denies any current form of  exercise.          Tobacco/Alcohol/Supplements:     Last Reviewed on 11/25/2020 11:50 AM by Mattie Ontiveros    Tobacco: She has never smoked.  Non-drinker     Caffeine:  She admits to consuming caffeine via soda.          Substance Abuse History:     Last Reviewed on 11/25/2020 11:50 AM by Mattie Ontiveros    None         Mental Health History:     Last Reviewed on 11/25/2020 11:50 AM by Mattie Ontiveros        Generalized Anxiety Disorder     Major Depression         Communicable Diseases (eg STDs):     Last Reviewed on 11/25/2020 11:50 AM by Mattie Ontiveros    Reportable health conditions; NEGATIVE         Current Problems:     Last Reviewed on 11/25/2020 11:50 AM by Mattie Ontiveros    Essential (primary) hypertension    Hyperlipidemia, unspecified    Low back pain    Varicose veins of left lower extremity with inflammation    Pain in left knee    Other specified polyneuropathies    Menopausal and female climacteric states    Diarrhea, unspecified    Encounter for general adult medical examination without abnormal findings    Encounter for screening for other viral diseases    Encounter for screening mammogram for malignant neoplasm of breast    Encounter for immunization    Family history of diabetes mellitus        Immunizations:     Fluzone Quadrivalent (3+ years) 10/18/2018        Allergies:     Last Reviewed on 11/25/2020 11:50 AM by Mattie Ontiveros    Aspirin (ASA):      Melons:      ACE Inhibitors: cough         Current Medications:     Last Reviewed on 11/25/2020 11:50 AM by Mattie Ontiveros    Atorvastatin Calcium 40mg Tablet [1 tab hs]    sertraline 50 mg oral tablet [1/2 a day for first week then one a day]    GABAPENTIN 300MG    CAP  [TAKE 1 CAPSULE BY MOUTH ONCE DAILY]        Objective:        Vitals:         Current: 11/25/2020 11:41:35 AM    Ht:  5 ft, 6.5 in;  Wt: 257 lbs;  BMI: 40.9T: 97.3 F (oral);  BP: 137/70 mm Hg (left arm, sitting);  P: 79 bpm (left arm (BP Cuff), sitting);  sCr:  0.85 mg/dL;  GFR: 94.76        Exams:     PHYSICAL EXAM:     GENERAL: vital signs recorded - well developed, well nourished, obese;  no apparent distress;     EYES: extraocular movements intact; PERRL;     E/N/T: EARS: external auditory canal normal;  bilateral TMs are normal;  NOSE:  normal nasal mucosa, septum, turbinates, and sinuses; OROPHARYNX:  normal mucosa, dentition, gingiva, and posterior pharynx;     NECK: range of motion is normal;     RESPIRATORY: normal appearance and symmetric expansion of chest wall; normal respiratory rate and pattern with no distress;     CARDIOVASCULAR: normal rate; rhythm is regular;  no edema;     GASTROINTESTINAL: nontender; normal bowel sounds; no organomegaly;     LYMPHATIC: no enlargement of cervical or facial nodes; no supraclavicular nodes;     MUSCULOSKELETAL: gait: affected by a left leg limp;  decreased range of motion noted in: left knee flexion, extension, and;  pain with range of motion in: left knee flexion;     NEUROLOGIC: mental status: alert and oriented x 3;     PSYCHIATRIC: appropriate affect and demeanor; normal speech pattern; normal thought and perception;         Procedures:     Encounter for immunization    Regarding contraindications to an Influenza vaccine: Denies moderate/severe illness with/without fever; serious reaction to eggs, egg proteins, gentamicin, gelatin, arginine, neomycin or polymixin; serious reaction after recieving previous influenza vaccines; and history of Guillain-[place] Syndrome.        No contraindications were noted.  Patient tolerated the vaccine without any problems             Assessment:         Z00.00   Encounter for general adult medical examination without abnormal findings       M25.562   Pain in left knee       R19.7   Diarrhea, unspecified       Z23   Encounter for immunization       Z83.3   Family history of diabetes mellitus       Z11.59   Encounter for screening for other viral diseases       Z12.31   Encounter for  screening mammogram for malignant neoplasm of breast       N95.1   Menopausal and female climacteric states           ORDERS:         Meds Prescribed:       [Refilled] atorvastatin 40 mg oral tablet [1 tab hs], #30 (thirty) tablets, Refills: 2 (two)       [Refilled] sertraline 50 mg oral tablet [1/2 a day for first week then one a day], #30 (thirty) tablets, Refills: 2 (two)       [New Rx] diclofenac sodium 75 mg oral tablet, delayed release (enteric coated) [take 1 tablet (75 mg) by oral route 2 times per day], #60 (sixty) tablets, Refills: 1 (one)       [New Rx] Colestid 1 gram oral tablet [take 1 tablet (1 gram) by oral route before meals for DIARRHEA], #90 (ninety) tablets, Refills: 0 (zero)         Radiology/Test Orders:       [id number]  Left  radiologic examination, knee; three views  (Send-Out)            30328  DXA, bone density study, 1 or more sites; axial skeleton (eg hips, pelvis, spine)  (Send-Out)            17464  Screening mammography, bilateral (2-view film study of each breast)  (Send-Out)              Lab Orders:       [id number]  [facility] PHYSICAL: CMP, CBC, TSH, LIPID: [id number], [id number], [id number], [id number]  (Send-Out)            75138  [facility]  (Send-Out)              Other Orders:       [id number]  Influenza virus vaccine, quadrivalent, split virus, preservative free 3 years of age & older  (In-House)            1101F  Pt screen for fall risk; document no falls in past year or only 1 fall w/o injury in past year (JERRY)  (In-House)                      Plan:         Encounter for general adult medical examination without abnormal findings    LABORATORY:  Labs ordered to be performed today include PHYSICAL PANEL; CMP, CBC, TSH, LIPID.  MIPS Has had no falls or only one fall without injury in the past year Vaccines Flu and Pneumonia updated in Shot record           Orders:       [id number]  [facility] PHYSICAL: CMP, CBC, TSH, LIPID: [id number], [id number], [id number], [id number]  (Send-Out)            1101F  Pt  screen for fall risk; document no falls in past year or only 1 fall w/o injury in past year (JERRY)  (In-House)              Pain in left kneeMAY CONSIDER pt VS mri GIVEN THAT TEHRMALCOM IS LOSS OF RANGE OF MOTION IN HER KNEE        RADIOLOGY:  I have ordered a left knee x-ray to be done today.            Prescriptions:       [New Rx] diclofenac sodium 75 mg oral tablet, delayed release (enteric coated) [take 1 tablet (75 mg) by oral route 2 times per day], #60 (sixty) tablets, Refills: 1 (one)           Orders:       [id number]  Left  radiologic examination, knee; three views  (Send-Out)              Diarrhea, unspecifiedThis appears to be related to her choley  will try colestid and have her follow up if no improvement for further work up          Prescriptions:       [New Rx] Colestid 1 gram oral tablet [take 1 tablet (1 gram) by oral route before meals for DIARRHEA], #90 (ninety) tablets, Refills: 0 (zero)         Encounter for immunization        IMMUNIZATIONS given today: Influenza Quadrivalent psv free shot 3 & up.            Immunizations:       37080  Influenza virus vaccine, quadrivalent, split virus, preservative free 3 years of age & older  (In-House)                Dose (ml): 0.5  Site: left deltoid  Route: intramuscular  Administered by: Lo Holloway          : [employer]  Lot #: [id number]  Exp: 06/30/2021          NDC: [address]-32        Family history of diabetes mellitusshe recently had HgA1C at podiatry  She will get copy of results and bring them for us        Encounter for screening for other viral diseases    LABORATORY:  Labs ordered to be performed today include Hepatitis C Antibody.            Orders:       [id number]  [facility] Hepatitis C antibody  (Send-Out)              Encounter for screening mammogram for malignant neoplasm of breast        FOLLOW-UP TESTING #1:    RADIOLOGY:  I have ordered Mammogram Screening to be done today.            Orders:       [id number]  Screening  mammography, bilateral (2-view film study of each breast)  (Send-Out)              Menopausal and female climacteric states        FOLLOW-UP TESTING #1:    RADIOLOGY:  I have ordered Dexa Scan to be done today.            Orders:       [id number]  DXA, bone density study, 1 or more sites; axial skeleton (eg hips, pelvis, spine)  (Send-Out)                  Other Prescriptions:       [Refilled] atorvastatin 40 mg oral tablet [1 tab hs], #30 (thirty) tablets, Refills: 2 (two)       [Refilled] sertraline 50 mg oral tablet [1/2 a day for first week then one a day], #30 (thirty) tablets, Refills: 2 (two)         Charge Capture:         Primary Diagnosis:     Z00.00  Encounter for general adult medical examination without abnormal findings           Orders:      [id number]  Office/outpatient visit; established patient, level 4  (In-House)            09235  Preventive medicine, established patient, age 40-64 years  (In-House)            1101F  Pt screen for fall risk; document no falls in past year or only 1 fall w/o injury in past year (JERRY)  (In-House)              M25.562  Pain in left knee           Orders:      [id number]  Office/outpatient visit; established patient, level 3  (In-House)              R19.7  Diarrhea, unspecified     Z23  Encounter for immunization           Orders:      [id number]  Influenza virus vaccine, quadrivalent, split virus, preservative free 3 years of age & older  (In-House)              Z83.3  Family history of diabetes mellitus     Z11.59  Encounter for screening for other viral diseases     Z12.31  Encounter for screening mammogram for malignant neoplasm of breast     N95.1  Menopausal and female climacteric states

## 2021-05-18 NOTE — PROGRESS NOTES
Lissette Saavedra 1965     Preventive Medicine Services    Visit Date: Wed, Dec 9, 2020 10:47 am    Provider: Mattie Ontiveros N.P. (Assistant: Leda Prince MA )    Location:         Electronically signed by Mattie Ontiveros N.P. on  12/09/2020 10:34:09 PM                             Subjective:        CC: Mrs. Saavedra is a 55 year old White female.  ww; pt states she hasnt started colestid or atorvastatin        HPI:           Mrs. Saavedra presents with encounter for gynecological examination (general) (routine) without abnormal findings.  She cannot recall when she last had a physical exam.  She is menopausal.  She is not currently using any form of contraception.  She performs breast self-exams monthly.   Her last Pap smear was unknown years ago.   Her last mammogram was 5 years ago years ago and was normal.   She has never had a dexa scan. Preventative Health updated today.  Mrs. Saavedra denies any history of abnormal Pap smears.  Tobacco: She has never smoked.      ROS:     CONSTITUTIONAL:  Negative for chills, fatigue and fever.      CARDIOVASCULAR:  Negative for chest pain and pedal edema.      RESPIRATORY:  Negative for recent cough and dyspnea.      GASTROINTESTINAL:  Negative for abdominal pain, constipation, diarrhea, heartburn, nausea and vomiting.      GENITOURINARY:  Negative for dyspareunia, post-menopausal vaginal bleeding and urinary incontinence.      NEUROLOGICAL:  Negative for dizziness, fainting, headaches and paresthesias.      ENDOCRINE:  Negative for hair loss, polydipsia and polyphagia.      ALLERGIC/IMMUNOLOGIC:  Negative for seasonal allergies.      PSYCHIATRIC:  Negative for anxiety, depression and suicidal thoughts.          Past Medical History / Family History / Social History:         Last Reviewed on 12/09/2020 10:33 PM by Mattie Ontiveros    Past Medical History:                 PAST MEDICAL HISTORY         Allergies    Hyperlipidemia    Hypertension         PREVENTIVE  HEALTH MAINTENANCE             COLORECTAL CANCER SCREENING: Up to date (colonoscopy q10y; sigmoidoscopy q5y; Cologuard q3y) was last done 3/2019 - [facility]ARD  (DUE IN 3/2022); Cologuard normal     DENTAL CLEANING: was last done 2020 - [facility] / [facility]     EYE EXAM: was last done been some time     MAMMOGRAM: was last done long time     PAP SMEAR: was last done been some time no previous abnormals         PAST MEDICAL HISTORY             CURRENT MEDICAL PROVIDERS:    podiatry - Dr. Anders         Surgical History:         Cholecystectomy: laparoscopic; About 2008; Other Surgeries    uterine ablasion;    left forearm injury - skin graft in childhood;    bilateral foot surgery  (mortons neuroma left and plantar fasciitis right);         Family History:         Positive for Coronary Artery Disease ( father; mother ).      Positive for Alzheimer's Disease ( father ).      Positive for Type 2 Diabetes ( father ).      Positive for Osteoarthritis ( mother ).          Social History:     Occupation: [employer]     Marital Status:      Children: 2 children and 1 step-child     Ms. Saavedra denies any current form of exercise.          Tobacco/Alcohol/Supplements:     Last Reviewed on 12/09/2020 10:33 PM by Mattie Ontiveros    Tobacco: She has never smoked.  Non-drinker     Caffeine:  She admits to consuming caffeine via soda.          Substance Abuse History:     Last Reviewed on 12/09/2020 10:33 PM by Mattie Ontiveros    None         Mental Health History:     Last Reviewed on 12/09/2020 10:33 PM by Mattie Ontiveros        Generalized Anxiety Disorder     Major Depression         Communicable Diseases (eg STDs):     Last Reviewed on 12/09/2020 10:33 PM by Mattie Ontiveros    Reportable health conditions; NEGATIVE         Current Problems:     Last Reviewed on 12/09/2020 10:33 PM by Mattie Ontiveros    Essential (primary) hypertension    Hyperlipidemia, unspecified    Low back pain    Varicose veins of  left lower extremity with inflammation    Pain in left knee    Other specified polyneuropathies    Menopausal and female climacteric states    Diarrhea, unspecified    Encounter for general adult medical examination without abnormal findings    Encounter for screening for other viral diseases    Encounter for screening mammogram for malignant neoplasm of breast    Encounter for immunization    Family history of diabetes mellitus    Encounter for screening for depression    Encounter for gynecological examination (general) (routine) without abnormal findings        Immunizations:     influenza, injectable, quadrivalent, preservative free (FLUZONE QUAD [id number]) 11/25/2020    Fluzone Quadrivalent (3+ years) 10/18/2018        Allergies:     Last Reviewed on 12/09/2020 10:33 PM by Mattie Ontiveros    Aspirin (ASA):      Melons:      ACE Inhibitors: cough         Current Medications:     Last Reviewed on 12/09/2020 10:33 PM by Mattie Ontiveros    atorvastatin 40 mg oral tablet [1 tab hs]    sertraline 50 mg oral tablet [1/2 a day for first week then one a day]    GABAPENTIN 300MG    CAP  [TAKE 1 CAPSULE BY MOUTH ONCE DAILY]    diclofenac sodium 75 mg oral tablet, delayed release (enteric coated) [take 1 tablet (75 mg) by oral route 2 times per day]    Colestid 1 gram oral tablet [take 1 tablet (1 gram) by oral route before meals for DIARRHEA]        Objective:        Vitals:         Current: 12/9/2020 10:58:07 AM    Ht:  5 ft, 6.5 in;  Wt: 261.2 lbs;  BMI: 41.5T: 97.1 F (temporal);  BP: 143/63 mm Hg (right arm, sitting);  P: 71 bpm (right arm (BP Cuff), sitting);  sCr: 0.89 mg/dL;  GFR: 91.12        Exams:     PHYSICAL EXAM:     GENERAL: vital signs recorded - well developed, well nourished;  well groomed;  no apparent distress;     EYES: extraocular movements intact; conjunctiva and cornea are normal; PERRLA;     E/N/T: OROPHARYNX:  normal mucosa, dentition, gingiva, and posterior pharynx;     NECK: range of motion  is normal; thyroid exam reveals not enlarged;     RESPIRATORY: normal respiratory rate and pattern with no distress; normal breath sounds with no rales, rhonchi, wheezes or rubs;     CARDIOVASCULAR: normal rate; rhythm is regular;  no systolic murmur; no edema;     GASTROINTESTINAL: nontender; normal bowel sounds;     GENITOURINARY: Pap smear taken; ;  vagina: normal with good pelvic support and no lesions or discharge;;  cervix: normal appearance without lesions or discharge;;  uterus: normal size and position, well-supported;;  adnexa: normal with no masses or tenderness; Chaperone: DECLINES     LYMPHATIC: no enlargement of cervical or facial nodes;     MUSCULOSKELETAL: normal gait; normal overall tone     NEUROLOGIC: mental status: alert and oriented x 3; Reflexes: brachioradialis: 2+; knee jerks: 2+;     PSYCHIATRIC:  appropriate affect and demeanor; normal speech pattern; grossly normal memory;         Lab/Test Results:         Glucose, Urine: Neg (12/09/2020),     Bilirubin, urine: Negative (12/09/2020),     Ketones, Urine Strip: Negative (12/09/2020),     Specific Gravity, urine: 1.025 (12/09/2020),     Blood in Urine: negative (12/09/2020),     pH, urine: 6.5 (12/09/2020),     Protein Urine QL: negative (12/09/2020),     Urobilinogen, urine: 0.2 E.U./dL (12/09/2020),     Nitrite, Urine: Negative (12/09/2020),     Leukoctyes, urine: Negative (12/09/2020),     Appearance: Clear (12/09/2020),     collection source: Clean-catch (12/09/2020),     Color: Yellow (12/09/2020),     Performed by:: al (12/09/2020),             Assessment:         Z01.419   Encounter for gynecological examination (general) (routine) without abnormal findings       Z13.31   Encounter for screening for depression           ORDERS:         Radiology/Test Orders:       3017F  Colorectal CA screen results documented and reviewed (PV)  (In-House)              Lab Orders:       [id number]  Urinalysis, automated, without microscopy  (In-House)             78937  Cytopathology, slides, cervical or vaginal; manual screening under MD supervision  (Send-Out)              Procedures Ordered:       90102  Handlg&/or convey of spec for TR office to lab  (In-House)              Other Orders:         Depression screen negative  (In-House)              Screening papanicolaou smear; obtaining, preparing, conveyance of cervical or vaginal smear to lab  (x1)                  Plan:         Encounter for gynecological examination (general) (routine) without abnormal findingsorders printed for her mammogram and dexa  - she will call and schedule these appts.            Orders:       [id number]  Urinalysis, automated, without microscopy  (In-House)            75442  Cytopathology, slides, cervical or vaginal; manual screening under MD supervision  (Send-Out)            15378  Handlg&/or convey of spec for TR office to lab  (In-House)              Screening papanicolaou smear; obtaining, preparing, conveyance of cervical or vaginal smear to lab  (x1)          Encounter for screening for depression    MIPS Negative Depression Screen           Orders:         Depression screen negative  (In-House)            3017F  Colorectal CA screen results documented and reviewed (PV)  (In-House)                  Charge Capture:         Primary Diagnosis:     Z01.419  Encounter for gynecological examination (general) (routine) without abnormal findings           Orders:      75550  Preventive medicine, established patient, age 40-64 years  (In-House)            45452  Urinalysis, automated, without microscopy  (In-House)            66929  Handlg&/or convey of spec for TR office to lab  (In-House)              Screening papanicolaou smear; obtaining, preparing, conveyance of cervical or vaginal smear to lab  (x1)          Z13.31  Encounter for screening for depression           Orders:        Depression screen negative  (In-House)            3017F  Colorectal CA screen  results documented and reviewed (PV)  (In-House)

## 2021-05-18 NOTE — PROGRESS NOTES
Lissette Saavedra. 1965     Office/Outpatient Visit    Visit Date: Thu, Nov 1, 2018 02:55 pm    Provider: Chava العراقي,   (Assistant: Saurabh Whittaker)    Location: [facility]        Electronically signed by Chava العراقي,   on  11/02/2018 05:18:57 PM                             SUBJECTIVE:        CC:     Ms. Saavedra is a 53 year old White female.  presents today due to dry cough, has been going on for a few weeks         HPI:     Started a couple weeks ago, non-productive, worse at night. Feels strangled. Never been a smoker. No sinus or nasal congestion. Has seasonal allergies but not on any medication for this. Feels a tickle in throat but nothing comes out. She was started on lisinopril at her last visit and Sx started afterward.     As above, patient recently started in lisinopril by myself.     ROS:     CONSTITUTIONAL:  Negative for fatigue and fever.      EYES:  Negative for blurred vision.      E/N/T:  Negative for diminished hearing and nasal congestion.      CARDIOVASCULAR:  Negative for chest pain and palpitations.      RESPIRATORY:  Positive for recent cough ( typically dry ).   Negative for dyspnea.      GASTROINTESTINAL:  Negative for abdominal pain, constipation, diarrhea, nausea and vomiting.      GENITOURINARY:  Negative for dysuria and urinary incontinence.      MUSCULOSKELETAL:  Negative for arthralgias and myalgias.      INTEGUMENTARY/BREAST:  Negative for atypical mole(s) and rash.      NEUROLOGICAL:  Negative for paresthesias and weakness.      PSYCHIATRIC:  Negative for anxiety, depression and sleep disturbance.          PMH/FMH/SH:     Last Reviewed on 10/18/2018 10:31 AM by Xin Nguyen    Tobacco/Alcohol/Supplements:     Last Reviewed on 10/18/2018 10:39 AM by Xin Nguyen    Tobacco: She has never smoked.          Substance Abuse History:     Last Reviewed on 10/18/2018 10:31 AM by Xin Nguyen        Mental Health History:     Last Reviewed on 10/18/2018 10:31  AM by Xin Nguyen        Communicable Diseases (eg STDs):     Last Reviewed on 10/18/2018 10:31 AM by Xin Nguyen            Current Problems:     Last Reviewed on 10/18/2018 10:31 AM by Xin Nguyen    Benign HTN     Major depression, recurrent episode, moderate     Shoulder pain         Immunizations:     Fluzone Quadrivalent (3+ years) 10/18/2018         Allergies:     Last Reviewed on 10/18/2018 10:37 AM by Xin Nguyen    Aspirin (ASA):    Melons:        Current Medications:     Last Reviewed on 10/18/2018 10:37 AM by Xin Nguyen    Atorvastatin Calcium 40mg Tablet 1 tab hs     Lisinopril 20mg Tablet 1 tab daily     Prozac 10mg Capsules Take 1 tab PO qd for 2 weeks; then 2 tabs qAM thereafter.         OBJECTIVE:        Vitals:         Current: 11/1/2018 3:00:42 PM    Ht:  5 ft, 6.5 in;  Wt: 264 lbs;  BMI: 42.0    T: 98.4 F (oral);  BP: 133/77 mm Hg (left arm, sitting);  P: 80 bpm (left arm (BP Cuff), sitting);  sCr: 0.85 mg/dL;  GFR: 98.06        Exams:     PHYSICAL EXAM:     GENERAL: vital signs recorded - well developed, well nourished;  well groomed;  no apparent distress;     EYES: extraocular movements intact; conjunctiva and cornea are normal; PERRLA;     E/N/T: OROPHARYNX:  normal mucosa, dentition, gingiva, and posterior pharynx;     NECK: range of motion is normal; thyroid exam reveals not enlarged;     RESPIRATORY: normal respiratory rate and pattern with no distress; normal breath sounds with no rales, rhonchi, wheezes or rubs;     CARDIOVASCULAR: normal rate; rhythm is regular;  no systolic murmur; no edema;     GASTROINTESTINAL: nontender; normal bowel sounds;     MUSCULOSKELETAL: normal gait; normal overall tone     NEUROLOGIC: mental status: alert and oriented x 3;     PSYCHIATRIC:  appropriate affect and demeanor; normal speech pattern; grossly normal memory;         ASSESSMENT           786.2   R05  Cough              DDx:     401.1   I10  Benign HTN              DDx:          ORDERS:         Meds Prescribed:       Losartan 50mg Tablet 1 tab daily  #30 (Thirty) tablet(s) Refills: 0       Claritin (Loratadine) 10mg Tablet 1 tab(s) by mouth daily  #30 (Thirty) tablet(s) Refills: 5                 PLAN:          Cough This is likely an ACE cough given timing. Will switch to losartan and claritin ordered in case cough persists after switching.           Prescriptions:       Claritin (Loratadine) 10mg Tablet 1 tab(s) by mouth daily  #30 (Thirty) tablet(s) Refills: 5          Benign HTN           Prescriptions:       Losartan 50mg Tablet 1 tab daily  #30 (Thirty) tablet(s) Refills: 0             CHARGE CAPTURE           **Please note: ICD descriptions below are intended for billing purposes only and may not represent clinical diagnoses**        Primary Diagnosis:         786.2 Cough            R05    Cough              Orders:          66970   Office/outpatient visit; established patient, level 4  (In-House)           401.1 Benign HTN            I10    Essential (primary) hypertension

## 2021-05-18 NOTE — PROGRESS NOTES
Lissette Saavedra 1965     Office/Outpatient Visit    Visit Date: Fri, May 17, 2019 11:37 am    Provider: Mattie Ontiveros N.P. (Assistant: Sarah Spurling, MA)    Location: [facility]        Electronically signed by Mattie Ontiveros N.P. on  05/17/2019 01:37:31 PM                             SUBJECTIVE:        CC:     Mrs. Saavedra is a 54 year old White female.  She presents with allergies and cough.          HPI: Ms. Saavedra reports that she has not been taking her medication  - feels that her BP is running ok at home (similar to today), her son is back home so she does not feel like she needs the prozac, she reports that her cholesterol levels at work were good '165' and only take the allergy medication as needed .  She would also like to switch providers, as she feels more comfortable with a woman provider         With regard to the cough, it has been present for the past been going on for several months but worse over the past weeks.  She denies cough, chest tightness, shortness of breath and wheezing.  She denies chest congestion, fever, headache, nasal congestion and wheezing.  Sputum is described as none.  She denies exposure to ill contacts.  She has not tried any medications for symptomatic relief.      ROS:     CONSTITUTIONAL:  Negative for chills, fatigue, fever, and weight change.      CARDIOVASCULAR:  Negative for chest pain, orthopnea, paroxysmal nocturnal dyspnea and pedal edema.      RESPIRATORY:  Positive for chronic cough.   Negative for dyspnea, pleuritic chest pain or frequent wheezing.      GASTROINTESTINAL:  Negative for abdominal pain, acid reflux symptoms, constipation, diarrhea, nausea and vomiting.      NEUROLOGICAL:  Negative for dizziness, headaches, paresthesias, and weakness.      ALLERGIC/IMMUNOLOGIC:  Positive for seasonal allergies.      PSYCHIATRIC:  Negative for anxiety, depression, and sleep disturbances.          PMH/FMH/SH:     Last Reviewed on 5/17/2019 11:54  AM by Mattie Ontiveros    Past Medical History:                 PAST MEDICAL HISTORY         Allergies    Hyperlipidemia    Hypertension         Surgical History:         Cholecystectomy: laparoscopic; About 2008;         Family History:         Positive for Coronary Artery Disease ( father; mother ).      Positive for Alzheimer's Disease ( father ).      Positive for Type 2 Diabetes ( father ).      Positive for Osteoarthritis ( mother ).          Social History:     Occupation: [employer]     Marital Status:      Children: 2 children and 1 step-child     Ms. Saavedra denies any current form of exercise.          Tobacco/Alcohol/Supplements:     Last Reviewed on 5/17/2019 11:39 AM by Spurling, Sarah C    Tobacco: She has never smoked.  Non-drinker         Substance Abuse History:     Last Reviewed on 10/18/2018 10:31 AM by Xin Nguyen    None         Mental Health History:     Last Reviewed on 10/18/2018 10:31 AM by Xin Nguyen        Communicable Diseases (eg STDs):     Last Reviewed on 10/18/2018 10:31 AM by Xin Nguyen            Current Problems:     Last Reviewed on 5/17/2019 11:54 AM by Mattie Ontiveros    Hyperlipidemia     Benign HTN     Major depression, recurrent episode, moderate     Allergies     Cough         Immunizations:     Fluzone Quadrivalent (3+ years) 10/18/2018         Allergies:     Last Reviewed on 5/17/2019 11:39 AM by Spurling, Sarah C    Aspirin (ASA):    ACE Inhibitors: cough    Melons:        Current Medications:     Last Reviewed on 5/17/2019 11:54 AM by Mattie Ontiveros    Prozac 40mg Capsules Take 1 capsule(s) by mouth daily     Atorvastatin Calcium 40mg Tablet 1 tab hs     Losartan 50mg Tablet 1 tab daily     Diclofenac Sodium 75mg Tablets, Enteric Coated 1 tab bid with food         OBJECTIVE:        Vitals:         Current: 5/17/2019 11:41:34 AM    Ht:  5 ft, 6.5 in;  Wt: 265.2 lbs;  BMI: 42.2    T: 97.9 F (oral);  BP: 131/66 mm Hg (right arm, sitting);  P: 76  bpm (right arm (BP Cuff), sitting);  sCr: 0.85 mg/dL;  GFR: 97.14        Exams:     PHYSICAL EXAM:     GENERAL: vital signs recorded - well developed, well nourished;  no apparent distress;     E/N/T: EARS:  normal external auditory canals and tympanic membranes;  grossly normal hearing; OROPHARYNX:  normal mucosa, dentition, gingiva, and posterior pharynx;     RESPIRATORY: normal respiratory rate and pattern with no distress; normal breath sounds with no rales, rhonchi, wheezes or rubs;     CARDIOVASCULAR: normal rate; rhythm is regular;  no systolic murmur; no edema;     GASTROINTESTINAL: nontender; normal bowel sounds; no organomegaly;     LYMPHATIC: no enlargement of cervical or facial nodes; no supraclavicular nodes;     MUSCULOSKELETAL: normal gait; normal range of motion of all major muscle groups; no limb or joint pain with range of motion;     NEUROLOGICAL:  cranial nerves, motor and sensory function, reflexes, gait and coordination are all intact;     PSYCHIATRIC:  appropriate affect and demeanor; normal speech pattern; grossly normal memory;         ASSESSMENT:           477.0   J30.1  Allergies              DDx:     786.2   R05  Cough              DDx:     401.1   I10  Benign HTN              DDx:     296.32   F33.1  Major depression, recurrent episode, moderate              DDx:     272.4   E78.5  Hyperlipidemia              DDx:         ORDERS:         Lab Orders:       APPTO  Appointment need  (In-House)                   PLAN:          Allergies would recommend that she resume her allergy medicaiton over the next few weeks  - this could be part of the coughing from the drainage - may consider a stronger OTC - zyrtec or allegra          Cough         RECOMMENDATIONS given include: I would recommend that she try Omeprazole OTC - this may help - only stay on for 2 weeks - will have her follow up in 4 weeks  - if no better, we will get a CXR to further evaluate.           Benign HTN          RECOMMENDATIONS given include: BP today elevated - I instructed her that would highly recommend that she stay on the BP medication  - due to her family history and the level of the cholesterol levels - we will repeat the lipid panel as well to see if improved.      FOLLOW-UP: Schedule a follow-up visit in 3 months..            Orders:       APPTO  Appointment need  (In-House)            Major depression, recurrent episode, moderate She will stay off the prozac - she may need to go back on at some point, but is ok without for now  - I have instructed that if she determines needs refills, will call the office and get us to send in refill          Hyperlipidemia recommend resuming Atorvastatin - she will get repeat lipids today and follow up from there             Patient Recommendations:        For  Cough:     I also recommend I would recommend that she try Omeprazole OTC - this may help - only stay on for 2 weeks - will have her follow up in 4 weeks  - if no better, we will get a CXR to further evaluate.          For  Benign HTN:     I also recommend BP today elevated - I instructed her that would highly recommend that she stay on the BP medication  - due to her family history and the level of the cholesterol levels - we will repeat the lipid panel as well to see if improved.  Schedule a follow-up visit in 3 months.                APPOINTMENT INFORMATION:        Monday Tuesday Wednesday Thursday Friday Saturday Sunday            Time:___________________AM  PM   Date:_____________________             CHARGE CAPTURE:           Primary Diagnosis:     477.0 Allergies            J30.1    Allergic rhinitis due to pollen              Orders:          06007   Office/outpatient visit; established patient, level 3  (In-House)           786.2 Cough            R05    Cough    401.1 Benign HTN            I10    Essential (primary) hypertension              Orders:          APPTO   Appointment need  (In-House)            296.32 Major depression, recurrent episode, moderate            F33.1    Major depressive disorder, recurrent, moderate    272.4 Hyperlipidemia            E78.5    Hyperlipidemia, unspecified

## 2021-05-18 NOTE — PROGRESS NOTES
Lissette Saavedra  1965     Office/Outpatient Visit    Visit Date: Fri, Apr 9, 2021 12:59 pm    Provider: Mattie Ontiveros N.P. (Assistant: Leta Valadez, )    Location: [facility]        Electronically signed by Mattie Ontiveros N.P. on  04/13/2021 11:13:06 PM                             Subjective:        CC: Mrs. Saavedra is a 55 year old White female.  vaginal itching-- not taking atorvastatin;         HPI:           Patient complains of acute vaginitis.  The presenting complaint is vaginal irritation with no discharge.  about 1 week ago, started  having incontinence and noticed that she was getting red around the groin/innter thighs  thinks may be irritated     ROS:     CONSTITUTIONAL:  Negative for chills, fatigue and fever.      CARDIOVASCULAR:  Negative for chest pain and pedal edema.      RESPIRATORY:  Negative for recent cough and dyspnea.      GASTROINTESTINAL:  Negative for abdominal pain, nausea and vomiting.      GENITOURINARY:  Positive for vaginal itching.   Negative for dysuria, history of recurrent bacterial vaginosis, vaginal discharge or change in urine stream.      MUSCULOSKELETAL:  Negative for arthralgias and myalgias.      INTEGUMENTARY/BREAST:  Negative for pruritis and rash.      NEUROLOGICAL:  Negative for dizziness, fainting, headaches and paresthesias.          Past Medical History / Family History / Social History:         Last Reviewed on 4/13/2021 11:12 PM by Mattie Ontiveros    Past Medical History:                 PAST MEDICAL HISTORY         Allergies    Hyperlipidemia    Hypertension         PREVENTIVE HEALTH MAINTENANCE             COLORECTAL CANCER SCREENING: Up to date (colonoscopy q10y; sigmoidoscopy q5y; Cologuard q3y) was last done 3/2019 - [facility]  (DUE IN 3/2022); Cologuard normal     DENTAL CLEANING: was last done 2020 - [facility]     EYE EXAM: was last done been some time     MAMMOGRAM: was last done long time     PAP  SMEAR: was last done 12/9/2020 with normal results The next one is due  12/2025 no previous abnormals         PAST MEDICAL HISTORY             CURRENT MEDICAL PROVIDERS:    podiatry - Dr. Anders         Surgical History:         Cholecystectomy: laparoscopic; About 2008; Other Surgeries    uterine ablasion;    left forearm injury - skin graft in childhood;    bilateral foot surgery  (mortons neuroma left and plantar fasciitis right);         Family History:         Positive for Coronary Artery Disease ( father; mother ).      Positive for Alzheimer's Disease ( father ).      Positive for Type 2 Diabetes ( father ).      Positive for Osteoarthritis ( mother ).          Social History:     Occupation: [employer]     Marital Status:      Children: 2 children and 1 step-child     Ms. Saavedra denies any current form of exercise.          Tobacco/Alcohol/Supplements:     Last Reviewed on 4/09/2021 01:07 PM by Leta Valadez    Tobacco: She has never smoked.  Non-drinker     Caffeine:  She admits to consuming caffeine via soda.          Substance Abuse History:     Last Reviewed on 12/09/2020 10:33 PM by Mattie Ontiveros    None         Mental Health History:     Last Reviewed on 12/09/2020 10:33 PM by Mattie Ontiveros        Generalized Anxiety Disorder     Major Depression         Communicable Diseases (eg STDs):     Last Reviewed on 12/09/2020 10:33 PM by Mattie Ontiveros    Reportable health conditions; NEGATIVE         Current Problems:     Last Reviewed on 4/13/2021 11:12 PM by Mattie Ontiveros    Essential (primary) hypertension    Hyperlipidemia, unspecified    Low back pain    Varicose veins of left lower extremity with inflammation    Pain in left knee    Other specified polyneuropathies    Menopausal and female climacteric states    Diarrhea, unspecified    Encounter for general adult medical examination without abnormal findings    Encounter for screening for other viral diseases    Encounter for  screening mammogram for malignant neoplasm of breast    Encounter for immunization    Family history of diabetes mellitus    Encounter for screening for depression    Encounter for gynecological examination (general) (routine) without abnormal findings    Acute vaginitis    Candidiasis of skin and nail    Other long term (current) drug therapy        Immunizations:     influenza, injectable, quadrivalent, preservative free (FLUZONE QUAD 8261-1712) 11/25/2020    Fluzone Quadrivalent (3+ years) 10/18/2018        Allergies:     Last Reviewed on 4/09/2021 01:07 PM by Leta Valadez    Aspirin (ASA):      Melons:      ACE Inhibitors: cough         Current Medications:     Last Reviewed on 4/09/2021 01:07 PM by Leta Valadez    atorvastatin 40 mg oral tablet [1 tab hs]    sertraline 50 mg oral tablet [1/2 a day for first week then one a day]    GABAPENTIN 300MG    CAP  [TAKE 1 CAPSULE BY MOUTH ONCE DAILY]    diclofenac sodium 75 mg oral tablet, delayed release (enteric coated) [Take 1 tablet by mouth twice daily]        Objective:        Vitals:         Current: 4/9/2021 1:08:29 PM    Ht:  5 ft, 6.5 in;  Wt: 264.2 lbs;  BMI: 42.0T: 96.5 F (temporal);  BP: 150/86 mm Hg (left arm, sitting);  P: 83 bpm (left arm (BP Cuff), sitting);  sCr: 0.89 mg/dL;  GFR: 91.57        Repeat:     1:9:42 PM  BP:   142/84mm Hg (right arm, sitting, HR: 80)     Exams:     PHYSICAL EXAM:     GENERAL:  well developed and nourished; appropriately groomed; in no apparent distress;     RESPIRATORY: normal respiratory rate and pattern with no distress; normal breath sounds with no rales, rhonchi, wheezes or rubs;     CARDIOVASCULAR: normal rate; rhythm is regular;  no systolic murmur; no edema;     GENITOURINARY: external genitalia: candidiasis; ; Chaperone: declines     LYMPHATIC: no enlargement of cervical or facial nodes; no supraclavicular nodes;     BREAST/INTEGUMENT: SKIN: no significant rashes or lesions; no suspicious moles;      MUSCULOSKELETAL:  Normal range of motion, strength and tone;     NEUROLOGIC: mental status: alert and oriented x 3; GROSSLY INTACT     PSYCHIATRIC:  appropriate affect and demeanor; normal speech pattern; grossly normal memory;         Assessment:         N76.0   Acute vaginitis       Z79.899   Other long term (current) drug therapy       B37.2   Candidiasis of skin and nail           ORDERS:         Meds Prescribed:       [New Rx] Diflucan 150 mg oral tablet [take 1 tablet (150 mg) by oral route now  may repeat in 1 week], #2 (two) tablets, Refills: 0 (zero)       [New Rx] Desenex 2 % Topical Powder [apply to the affected area(s) by topical route 2 times per day in the morning and evening (unless using cream)], #43 (forty three) grams, Refills: 1 (one)       [New Rx] clotrimazole 1 % Topical Cream [apply to the affected and surrounding areas of skin by topical route 2 times per day in the morning and evening], #30 (thirty) grams, Refills: 1 (one)         Lab Orders:       [id number]  [facility] - [facility] Urinalysis, automated, with micro  (Send-Out)            54525  [facility] Urine Culture  (Send-Out)            81155  COMP [facility] Comp. Metabolic Panel  (Send-Out)                      Plan:         Acute vaginitis    LABORATORY:  Labs ordered to be performed today include urinalysis with micro and Urine culture.            Prescriptions:       [New Rx] Diflucan 150 mg oral tablet [take 1 tablet (150 mg) by oral route now  may repeat in 1 week], #2 (two) tablets, Refills: 0 (zero)       [New Rx] clotrimazole 1 % Topical Cream [apply to the affected and surrounding areas of skin by topical route 2 times per day in the morning and evening], #30 (thirty) grams, Refills: 1 (one)           Orders:       [id number]  [facility] - [facility] Urinalysis, automated, with micro  (Send-Out)            [id number]  [facility] - [facility] Urine Culture  (Send-Out)              Other long term (current) drug therapy    LABORATORY:  Labs ordered to be performed today include  Comprehensive metabolic panel.            Orders:       [id number]  COMP - [facility] Comp. Metabolic Panel  (Send-Out)              Candidiasis of skin and nail          Prescriptions:       [New Rx] Desenex 2 % Topical Powder [apply to the affected area(s) by topical route 2 times per day in the morning and evening (unless using cream)], #43 (forty three) grams, Refills: 1 (one)             Charge Capture:         Primary Diagnosis:     N76.0  Acute vaginitis           Orders:      [id number]  Office/outpatient visit; established patient, level 3  (In-House)              Z79.899  Other long term (current) drug therapy     B37.2  Candidiasis of skin and nail

## 2021-05-18 NOTE — PROGRESS NOTES
Lissette Saavedra  1965     Office/Outpatient Visit    Visit Date: Wed, Morris 15, 2020 02:14 pm    Provider: Chava العراقي MD (Assistant: Leta Valadez, )    Location: [facility]        Electronically signed by Chava العراقي MD on  01/15/2020 07:59:06 PM                             Subjective:        CC: Mrs. Saavedra is a 54 year old White female.  presents today due to body aches, cough         HPI:       Pt left work 2 days ago with dizziness, diffuse muscle aches, back pain, cough, headache. Overall feeling bad and ill. No fever during this time. Sx began somewhat suddenly around 11:30am prior to going to work. Dizziness has resolved but fatigue and headache has resolved. She is taking advil and mucinex. She is coughing up a little phlegm.      Pt has low back off and on for a week. No pain with urination and no fever. Worse with sitting and more right sided. She has no h/o LBP but has had a kidney infection a couple times several years ago. No dysuria, she does have urinary urgency and frequency. No vaginal discharge.    ROS:     CONSTITUTIONAL:  Positive for fatigue.   Negative for chills or fever.      EYES:  Negative for blurred vision.      E/N/T:  Negative for diminished hearing and nasal congestion.      CARDIOVASCULAR:  Negative for chest pain and palpitations.      RESPIRATORY:  Positive for recent cough.   Negative for dyspnea.      GASTROINTESTINAL:  Negative for abdominal pain, constipation, diarrhea, nausea and vomiting.      GENITOURINARY:  Negative for dysuria and urinary incontinence.      MUSCULOSKELETAL:  Positive for back pain and myalgias.   Negative for arthralgias.      NEUROLOGICAL:  Positive for dizziness and headaches.   Negative for paresthesias or weakness.      PSYCHIATRIC:  Negative for anxiety, depression and sleep disturbance.          Past Medical History / Family History / Social History:         Last Reviewed on 1/15/2020 02:35 PM by Chava العراقي  [facility]    Past Medical History:                 PAST MEDICAL HISTORY         Allergies    Hyperlipidemia    Hypertension         PREVENTIVE HEALTH MAINTENANCE             EYE EXAM: was last done been some time     MAMMOGRAM: was last done long time The next one is due  now     PAP SMEAR: was last done been some time no previous abnormals         Surgical History:         Cholecystectomy: laparoscopic; About 2008;         Family History:         Positive for Coronary Artery Disease ( father; mother ).      Positive for Alzheimer's Disease ( father ).      Positive for Type 2 Diabetes ( father ).      Positive for Osteoarthritis ( mother ).          Social History:     Occupation: [employer]     Marital Status:      Children: 2 children and 1 step-child     Ms. Saavedra denies any current form of exercise.          Tobacco/Alcohol/Supplements:     Last Reviewed on 1/15/2020 02:35 PM by Chava العراقي    Tobacco: She has never smoked.  Non-drinker         Substance Abuse History:     Last Reviewed on 1/15/2020 02:35 PM by Chava العراقي    None         Mental Health History:     Last Reviewed on 1/15/2020 02:35 PM by Chava العراقي        Communicable Diseases (eg STDs):     Last Reviewed on 1/15/2020 02:35 PM by Chava العراقي        Current Problems:     Last Reviewed on 1/15/2020 02:35 PM by Chava العراقي    Major depressive disorder, recurrent, moderate    Essential (primary) hypertension    Major depression, recurrent episode, moderate    Benign HTN    Cough    Cough    Hyperlipidemia    Hyperlipidemia, unspecified    Screening for cancer of colon    Low back pain    Myalgia, unspecified site    Encounter for screening for malignant neoplasm of colon        Immunizations:     Fluzone Quadrivalent (3+ years) 10/18/2018        Allergies:     Last Reviewed on 1/15/2020 02:35 PM by Chava العراقي    Aspirin (ASA):      Melons:      ACE Inhibitors: cough         Current Medications:     Last Reviewed on  1/15/2020 02:35 PM by Chava العراقي    Atorvastatin Calcium 40mg Tablet [1 tab hs]    Sertraline HCl 50mg Tablet [1/2 a day for first week then one a day]        Objective:        Vitals:         Current: 1/15/2020 2:20:27 PM    Ht:  5 ft, 6.5 in;  Wt: 257.8 lbs;  BMI: 41.0T: 98.1 F (oral);  BP: 129/78 mm Hg (right arm, sitting);  P: 89 bpm (right arm (BP Cuff), sitting);  sCr: 0.85 mg/dL;  GFR: 95.98        Exams:     PHYSICAL EXAM:     GENERAL: vital signs recorded - well developed, well nourished;  well groomed;  no apparent distress, appears minimally ill, tired-appearing;     EYES: extraocular movements intact; conjunctiva and cornea are normal; PERRLA;     E/N/T: OROPHARYNX: posterior pharynx shows erythema;     NECK: range of motion is normal; thyroid exam reveals not enlarged;     RESPIRATORY: normal respiratory rate and pattern with no distress; normal breath sounds with no rales, rhonchi, wheezes or rubs;     CARDIOVASCULAR: normal rate; rhythm is regular;  no systolic murmur; no edema;     GASTROINTESTINAL: nontender; normal bowel sounds;     MUSCULOSKELETAL: normal gait; normal overall tone     NEUROLOGIC: mental status: alert and oriented x 3;     PSYCHIATRIC:  appropriate affect and demeanor; normal speech pattern; grossly normal memory;         Lab/Test Results:         Glucose, Urine: Neg (01/15/2020),     Bilirubin, urine: Negative (01/15/2020),     Ketones, Urine Strip: Negative (01/15/2020),     Specific Gravity, urine: 1.025 (01/15/2020),     Blood in Urine: negative (01/15/2020),     pH, urine: 6.0 (01/15/2020),     Protein Urine QL: trace (01/15/2020),     Urobilinogen, urine: 0.2 E.U./dL (01/15/2020),     Nitrite, Urine: Negative (01/15/2020),     Leukoctyes, urine: Negative (01/15/2020),     Appearance: Clear (01/15/2020),     collection source: Clean-catch (01/15/2020),     Color: Yellow (01/15/2020),     Performed by:: ag (01/15/2020),     Influenza A: Positive (01/15/2020),      Influenza B: Negative (01/15/2020),             Assessment:         J09.X2   Influenza due to identified novel influenza A virus with other respiratory manifestations       M54.5   Low back pain           ORDERS:         Lab Orders:       [id number]  Infectious agent antigen detection by immunoassay; Influenza  (In-House)            42867-26  Infectious agent antigen detection by immunoassay; Influenza  (In-House)            36449  [facility] - [facility] Urinalysis, automated, with micro  (Send-Out; Stat)            69804  Urinalysis, automated, without microscopy  (In-House)                      Plan:         Influenza due to identified novel influenza A virus with other respiratory manifestationsFlu swab is positive for flu A. Pt is just outside of treatment window and she has no insurance and therefore declines tamiflu for fear of cost. She is advised to try claritin, flonase, and mucinex for Sx relief, and rest.     School/Work Excuse for Yesterday Today Tomorrow           Orders:       [id number]  Infectious agent antigen detection by immunoassay; Influenza  (In-House)            13002-29  Infectious agent antigen detection by immunoassay; Influenza  (In-House)              Low back painUA is without sign of infection or blood making pyelonephritis or kidney stone unlikely. This is more likely muscle strain and hopefully will resolve with time, rest, and heating pad.           Orders:       [id number]  [facility] - [facility] Urinalysis, automated, with micro  (Send-Out; Stat)            70098  Urinalysis, automated, without microscopy  (In-House)                  Charge Capture:         Primary Diagnosis:     J09.X2  Influenza due to identified novel influenza A virus with other respiratory manifestations           Orders:      79069  Office/outpatient visit; established patient, level 4  (In-House)            01921  Infectious agent antigen detection by immunoassay; Influenza  (In-House)            76554-05  Infectious agent antigen detection by  immunoassay; Influenza  (In-House)              M54.5  Low back pain           Orders:      [id number]  Urinalysis, automated, without microscopy  (In-House)

## 2021-05-18 NOTE — PROGRESS NOTES
Lissette Saavedra 1965     Office/Outpatient Visit    Visit Date: Thu, Oct 18, 2018 10:30 am    Provider: Chava العراقي,   (Assistant: Xin Nguyen MA)    Location: [facility]        Electronically signed by Chava العراقي,   on  10/20/2018 09:55:42 PM                             SUBJECTIVE:        CC:     Ms. Saavedra is a 53 year old White female.  Patient is here to establish care.;         HPI: Pt originally from [place], has ived here since '71. 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 She has h/o HTN but has not been on any meds in several years. Also has depression was on prozac but hasn't taken it in about 9 months. Her father was just put in a nursing home.  and son are currently incarcerated.         PHQ-9 Depression Screening: Completed form scanned and in chart; Total Score 15 Alcohol Consumption Screening: Completed form scanned and in chart; Total Score 1     As above, has not taken prozac for the last 9 month. Pt reports prozac has worked well for her depression.     Checks BP once a month at [facility] and it typically runs about 140/90 or a little higher. No headache.     Pt reports chronic shoulder pain, worse with abduction and lifting. Pain makes it difficult for her load clothes at work.     ROS:     CONSTITUTIONAL:  Negative for fatigue and fever.      EYES:  Negative for blurred vision.      E/N/T:  Negative for diminished hearing and nasal congestion.      CARDIOVASCULAR:  Negative for chest pain and palpitations.      RESPIRATORY:  Negative for recent cough and dyspnea.      GASTROINTESTINAL:  Negative for abdominal pain, constipation, diarrhea, nausea and vomiting.      GENITOURINARY:  Negative for dysuria and urinary incontinence.      MUSCULOSKELETAL:  Positive for arthralgias.   Negative for myalgias.      INTEGUMENTARY/BREAST:  Negative for atypical mole(s) and rash.      NEUROLOGICAL:  Negative for paresthesias and weakness.      PSYCHIATRIC:  Positive for  anxiety and depression.   Negative for sleep disturbance.          PMH/FMH/SH:     Last Reviewed on 10/18/2018 10:31 AM by Xin Nguyen    Tobacco/Alcohol/Supplements:     Last Reviewed on 10/18/2018 10:31 AM by Xin Nguyen        Substance Abuse History:     Last Reviewed on 10/18/2018 10:31 AM by Xin Nguyen        Mental Health History:     Last Reviewed on 10/18/2018 10:31 AM by Xin Nguyen        Communicable Diseases (eg STDs):     Last Reviewed on 10/18/2018 10:31 AM by Xin Nguyen            Current Problems:     Last Reviewed on 10/18/2018 10:31 AM by Xin Nguyen      None Recorded         Immunizations:     None        Allergies:     Last Reviewed on 10/18/2018 10:31 AM by Xin Nguyen        Current Medications:     Last Reviewed on 10/18/2018 10:31 AM by Xin Nguyen    None        OBJECTIVE:        Vitals:         Current: 10/18/2018 10:36:53 AM    Ht:  5 ft, 6.5 in;  Wt: 236.8 lbs;  BMI: 37.6    T: 97.9 F (oral);  BP: 141/93 mm Hg (right arm, sitting);  P: 90 bpm (right arm (BP Cuff), sitting)        Exams:     PHYSICAL EXAM:     GENERAL: vital signs recorded - well developed, well nourished;  well groomed;  no apparent distress;     EYES: extraocular movements intact; conjunctiva and cornea are normal; PERRLA;     E/N/T: OROPHARYNX:  normal mucosa, dentition, gingiva, and posterior pharynx;     NECK: range of motion is normal; thyroid exam reveals not enlarged;     RESPIRATORY: normal respiratory rate and pattern with no distress; normal breath sounds with no rales, rhonchi, wheezes or rubs;     CARDIOVASCULAR: normal rate; rhythm is regular;  no systolic murmur; no edema;     GASTROINTESTINAL: nontender; normal bowel sounds;     MUSCULOSKELETAL: normal gait; pain with range of motion in: bilateral shoulder abduction and internal rotation;  normal overall tone     NEUROLOGIC: mental status: alert and oriented x 3;     PSYCHIATRIC:  appropriate affect and demeanor;  normal speech pattern; grossly normal memory;         Procedures:     Vaccination against other viral diseases, Influenza     1. Influenza, seasonal PF (children 3 years to adult): 0.5 ml unit dose given IM in the left upper arm; administered by ;  lot number [id number]; expires 06/30/19 Regarding contraindications to an Influenza vaccine: Denies moderate/severe illness with/without fever; serious reaction to eggs, egg proteins, gentamicin, gelatin, arginine, neomycin or polymixin; serious reaction after recieving previous influenza vaccines; and history of Guillain[place] Syndrome.              ASSESSMENT           V79.0   Z13.89  Screening for depression              DDx:     296.32   F33.1  Major depression, recurrent episode, moderate              DDx:     401.1   I10  Benign HTN              DDx:     719.41   M25.512  Shoulder pain              DDx:     V04.81   Z23  Vaccination against other viral diseases, Influenza              DDx:     V04.81   Z23  Vaccination against other viral diseases, Influenza              DDx:         ORDERS:         Meds Prescribed:       Prozac (Fluoxetine) 10mg Capsules Take 1 tab PO qd for 2 weeks; then 2 tabs qAM thereafter.  #60 (Sixty) capsule(s) Refills: 0       Lisinopril 20mg Tablet 1 tab daily  #30 (Thirty) tablet(s) Refills: 0         Lab Orders:       18571  [facility] PHYSICAL: CMP, CBC, TSH, LIPID: [id number], 30300, 03436, 64143  (Send-Out)         APPTO  Appointment need  (In-House)           Procedures Ordered:       REFER  Referral to Specialist or Other Facility  (Send-Out)         04916  Immunization administration; one vaccine  (In-House)           Other Orders:       22193  Influenza virus vaccine, quadrivalent, split virus, preservative free 3 years of age & older  (In-House)                   PLAN:          Major depression, recurrent episode, moderate Will restart prozac and increase dose as needed.           Prescriptions:       Prozac (Fluoxetine) 10mg Capsules  Take 1 tab PO qd for 2 weeks; then 2 tabs qAM thereafter.  #60 (Sixty) capsule(s) Refills: 0          Benign HTN     LABORATORY:  Labs ordered to be performed today include PHYSICAL PANEL; CMP, CBC, TSH, LIPID.      FOLLOW-UP: Schedule a follow-up visit in 1 month..            Prescriptions:       Lisinopril 20mg Tablet 1 tab daily  #30 (Thirty) tablet(s) Refills: 0           Orders:       22324  [facility] PHYSICAL: CMP, CBC, TSH, LIPID: [id number], 78352, 90022, 30563  (Send-Out)         APPTO  Appointment need  (In-House)            Shoulder pain Exam c/w glenohueral arthritis and mild rotator cuff tear.         REFERRALS:  Referral initiated to physical therapy ( [facility] ).            Orders:       REFER  Referral to Specialist or Other Facility  (Send-Out)            Vaccination against other viral diseases, Influenza           Orders:       83431  Immunization administration; one vaccine  (In-House)         18992  Influenza virus vaccine, quadrivalent, split virus, preservative free 3 years of age & older  (In-House)               Patient Recommendations:        For  Benign HTN:     Schedule a follow-up visit in 1 month.                APPOINTMENT INFORMATION:        Monday Tuesday Wednesday Thursday Friday Saturday Sunday            Time:___________________AM  PM   Date:_____________________             CHARGE CAPTURE           **Please note: ICD descriptions below are intended for billing purposes only and may not represent clinical diagnoses**        Primary Diagnosis:         V79.0 Screening for depression            Z13.89    Encounter for screening for other disorder              Orders:          47715   Office/outpatient visit; new patient, level 4  (In-House)           296.32 Major depression, recurrent episode, moderate            F33.1    Major depressive disorder, recurrent, moderate    401.1 Benign HTN            I10    Essential (primary) hypertension               Orders:          APPTO   Appointment need  (In-House)           719.41 Shoulder pain            M25.512    Pain in left shoulder    V04.81 Vaccination against other viral diseases, Influenza            Z23    Encounter for immunization              Orders:          51484   Immunization administration; one vaccine  (In-House)             74155   Influenza virus vaccine, quadrivalent, split virus, preservative free 3 years of age & older  (In-House)           V04.81 Vaccination against other viral diseases, Influenza            Z23    Encounter for immunization

## 2021-07-01 VITALS
WEIGHT: 266.4 LBS | HEIGHT: 67 IN | SYSTOLIC BLOOD PRESSURE: 148 MMHG | DIASTOLIC BLOOD PRESSURE: 74 MMHG | HEART RATE: 83 BPM | BODY MASS INDEX: 41.81 KG/M2 | TEMPERATURE: 97.6 F

## 2021-07-01 VITALS
BODY MASS INDEX: 42.53 KG/M2 | SYSTOLIC BLOOD PRESSURE: 125 MMHG | TEMPERATURE: 98.4 F | HEART RATE: 88 BPM | WEIGHT: 271 LBS | DIASTOLIC BLOOD PRESSURE: 65 MMHG | HEIGHT: 67 IN

## 2021-07-01 VITALS
HEIGHT: 67 IN | TEMPERATURE: 97.9 F | DIASTOLIC BLOOD PRESSURE: 93 MMHG | WEIGHT: 236.8 LBS | HEART RATE: 90 BPM | BODY MASS INDEX: 37.17 KG/M2 | SYSTOLIC BLOOD PRESSURE: 141 MMHG

## 2021-07-01 VITALS
BODY MASS INDEX: 41.44 KG/M2 | WEIGHT: 264 LBS | DIASTOLIC BLOOD PRESSURE: 77 MMHG | HEART RATE: 80 BPM | SYSTOLIC BLOOD PRESSURE: 133 MMHG | TEMPERATURE: 98.4 F | HEIGHT: 67 IN

## 2021-07-01 VITALS
DIASTOLIC BLOOD PRESSURE: 66 MMHG | HEART RATE: 76 BPM | HEIGHT: 67 IN | SYSTOLIC BLOOD PRESSURE: 131 MMHG | WEIGHT: 265.2 LBS | TEMPERATURE: 97.9 F | BODY MASS INDEX: 41.62 KG/M2

## 2021-07-01 VITALS
TEMPERATURE: 97.9 F | WEIGHT: 265.2 LBS | BODY MASS INDEX: 41.62 KG/M2 | SYSTOLIC BLOOD PRESSURE: 126 MMHG | HEIGHT: 67 IN | HEART RATE: 75 BPM | DIASTOLIC BLOOD PRESSURE: 68 MMHG

## 2021-07-02 VITALS
HEIGHT: 67 IN | WEIGHT: 257.8 LBS | BODY MASS INDEX: 40.46 KG/M2 | HEART RATE: 89 BPM | DIASTOLIC BLOOD PRESSURE: 78 MMHG | SYSTOLIC BLOOD PRESSURE: 129 MMHG | TEMPERATURE: 98.1 F

## 2021-07-02 VITALS
HEART RATE: 79 BPM | WEIGHT: 257 LBS | BODY MASS INDEX: 40.34 KG/M2 | DIASTOLIC BLOOD PRESSURE: 70 MMHG | HEIGHT: 67 IN | SYSTOLIC BLOOD PRESSURE: 137 MMHG | TEMPERATURE: 97.3 F

## 2021-07-02 VITALS
HEIGHT: 67 IN | DIASTOLIC BLOOD PRESSURE: 84 MMHG | HEART RATE: 83 BPM | BODY MASS INDEX: 41.47 KG/M2 | TEMPERATURE: 96.5 F | SYSTOLIC BLOOD PRESSURE: 142 MMHG | WEIGHT: 264.2 LBS

## 2021-07-02 VITALS
SYSTOLIC BLOOD PRESSURE: 143 MMHG | HEART RATE: 71 BPM | DIASTOLIC BLOOD PRESSURE: 63 MMHG | HEIGHT: 67 IN | BODY MASS INDEX: 41 KG/M2 | TEMPERATURE: 97.1 F | WEIGHT: 261.2 LBS

## 2021-07-02 VITALS
WEIGHT: 259.6 LBS | HEART RATE: 92 BPM | HEIGHT: 67 IN | TEMPERATURE: 98.6 F | SYSTOLIC BLOOD PRESSURE: 135 MMHG | BODY MASS INDEX: 40.74 KG/M2 | DIASTOLIC BLOOD PRESSURE: 77 MMHG

## 2021-07-16 ENCOUNTER — OFFICE VISIT (OUTPATIENT)
Dept: FAMILY MEDICINE CLINIC | Age: 56
End: 2021-07-16

## 2021-07-16 VITALS
TEMPERATURE: 97.7 F | WEIGHT: 264.4 LBS | DIASTOLIC BLOOD PRESSURE: 71 MMHG | HEIGHT: 66 IN | SYSTOLIC BLOOD PRESSURE: 132 MMHG | BODY MASS INDEX: 42.49 KG/M2 | HEART RATE: 80 BPM

## 2021-07-16 DIAGNOSIS — N76.1 SUBACUTE VAGINITIS: ICD-10-CM

## 2021-07-16 DIAGNOSIS — R31.29 OTHER MICROSCOPIC HEMATURIA: ICD-10-CM

## 2021-07-16 DIAGNOSIS — R82.90 ABNORMAL URINE ODOR: Primary | ICD-10-CM

## 2021-07-16 LAB
BILIRUB BLD-MCNC: NEGATIVE MG/DL
CLARITY, POC: ABNORMAL
COLOR UR: YELLOW
GLUCOSE UR STRIP-MCNC: NEGATIVE MG/DL
KETONES UR QL: NEGATIVE
LEUKOCYTE EST, POC: ABNORMAL
NITRITE UR-MCNC: NEGATIVE MG/ML
PH UR: 5.5 [PH] (ref 5–8)
PROT UR STRIP-MCNC: ABNORMAL MG/DL
RBC # UR STRIP: ABNORMAL /UL
SP GR UR: 1.01 (ref 1–1.03)
UROBILINOGEN UR QL: NORMAL

## 2021-07-16 PROCEDURE — 99214 OFFICE O/P EST MOD 30 MIN: CPT | Performed by: NURSE PRACTITIONER

## 2021-07-16 PROCEDURE — 81003 URINALYSIS AUTO W/O SCOPE: CPT | Performed by: NURSE PRACTITIONER

## 2021-07-16 PROCEDURE — 87086 URINE CULTURE/COLONY COUNT: CPT | Performed by: NURSE PRACTITIONER

## 2021-07-16 RX ORDER — NITROFURANTOIN 25; 75 MG/1; MG/1
100 CAPSULE ORAL 2 TIMES DAILY
Qty: 14 CAPSULE | Refills: 0 | Status: SHIPPED | OUTPATIENT
Start: 2021-07-16 | End: 2021-07-23

## 2021-07-16 RX ORDER — CLOTRIMAZOLE 1 G/ML
SOLUTION TOPICAL 2 TIMES DAILY PRN
Qty: 15 ML | Refills: 1 | Status: SHIPPED | OUTPATIENT
Start: 2021-07-16 | End: 2021-07-21

## 2021-07-16 RX ORDER — GABAPENTIN 300 MG/1
300 CAPSULE ORAL 2 TIMES DAILY
COMMUNITY
Start: 2021-07-06

## 2021-07-16 RX ORDER — DICLOFENAC SODIUM 75 MG/1
75 TABLET, DELAYED RELEASE ORAL 2 TIMES DAILY
COMMUNITY
Start: 2021-04-14 | End: 2021-09-21 | Stop reason: SDUPTHER

## 2021-07-16 RX ORDER — FLUCONAZOLE 150 MG/1
150 TABLET ORAL ONCE
Qty: 2 TABLET | Refills: 0 | Status: SHIPPED | OUTPATIENT
Start: 2021-07-16 | End: 2021-07-16

## 2021-07-16 NOTE — PROGRESS NOTES
"Chief Complaint  Rash (possible yeast infection ) and Urinary Tract Infection (cloudy urine, odor )    Subjective          [facility].  Needs more cream as did help in the past.  Concerned that she may have UTI or yeast infection. The diflucan did not help      Review of Systems   Constitutional: Negative for fatigue and fever.   Respiratory: Negative for shortness of breath.    Cardiovascular: Negative for chest pain.   Genitourinary: Positive for dysuria (cludy urine and vaginal rash/itching ) and vaginal discharge (not like previous).   Psychiatric/Behavioral: Negative for depressed mood. The patient is not nervous/anxious.          Health Maintenance Due   Topic Date Due   • COLORECTAL CANCER SCREENING  Never done   • ANNUAL PHYSICAL  Never done   • COVID-19 Vaccine (1) Never done   • TDAP/TD VACCINES (1 - Tdap) Never done   • ZOSTER VACCINE (1 of 2) Never done   • MAMMOGRAM  10/02/2017   • PAP SMEAR  Never done   • LIPID PANEL  07/16/2021        Objective     Vital Signs:   /71 (BP Location: Left arm, Patient Position: Sitting)   Pulse 80   Temp 97.7 °F (36.5 °C)   Ht 168.9 cm (66.5\")   Wt 120 kg (264 lb 6.4 oz)   BMI 42.04 kg/m²       Physical Exam  Constitutional:       General: She is not in acute distress.     Appearance: Normal appearance. She is not ill-appearing.   HENT:      Head: Normocephalic.   Cardiovascular:      Rate and Rhythm: Normal rate and regular rhythm.   Pulmonary:      Effort: Pulmonary effort is normal.      Breath sounds: Normal breath sounds.   Genitourinary:     Comments: Deferred    Musculoskeletal:         General: Normal range of motion.   Neurological:      General: No focal deficit present.      Mental Status: She is alert and oriented to person, place, and time.   Psychiatric:         Mood and Affect: Mood normal.         Behavior: Behavior normal.          Result Review :             "          Assessment and Plan    Diagnoses and all orders for this visit:    1. Abnormal urine odor (Primary)  -     POCT urinalysis dipstick, automated  -     Urine Culture - Urine, Urine, Clean Catch; Future  -     Urine Culture - Urine, Urine, Clean Catch    2. Subacute vaginitis  Comments:  will treat for vaginitis  as previous   Orders:  -     fluconazole (Diflucan) 150 MG tablet; Take 1 tablet by mouth 1 (One) Time for 1 dose. May repeat in 1 week  Dispense: 2 tablet; Refill: 0  -     clotrimazole (LOTRIMIN) 1 % external solution; Apply  topically to the appropriate area as directed 2 (Two) Times a Day As Needed (itching / irritation) for up to 5 days.  Dispense: 15 mL; Refill: 1    3. Other microscopic hematuria  Comments:  if culture negative, will need to repeat urine in 2 weeks for resolution of hematuria    Orders:  -     nitrofurantoin, macrocrystal-monohydrate, (Macrobid) 100 MG capsule; Take 1 capsule by mouth 2 (Two) Times a Day for 7 days.  Dispense: 14 capsule; Refill: 0            Follow Up    No follow-ups on file.      Patient was given instructions and counseling regarding her condition or for health maintenance advice. Please see specific information pulled into the AVS if appropriate.

## 2021-07-18 LAB — BACTERIA SPEC AEROBE CULT: NO GROWTH

## 2021-08-04 ENCOUNTER — LAB (OUTPATIENT)
Dept: LAB | Facility: HOSPITAL | Age: 56
End: 2021-08-04

## 2021-08-04 DIAGNOSIS — R31.29 OTHER MICROSCOPIC HEMATURIA: ICD-10-CM

## 2021-08-04 LAB
BILIRUB UR QL STRIP: NEGATIVE
CLARITY UR: CLEAR
COLOR UR: YELLOW
GLUCOSE UR STRIP-MCNC: NEGATIVE MG/DL
HGB UR QL STRIP.AUTO: NEGATIVE
KETONES UR QL STRIP: NEGATIVE
LEUKOCYTE ESTERASE UR QL STRIP.AUTO: NEGATIVE
NITRITE UR QL STRIP: NEGATIVE
PH UR STRIP.AUTO: 7 [PH] (ref 5–8)
PROT UR QL STRIP: NEGATIVE
SP GR UR STRIP: 1.02 (ref 1–1.03)
UROBILINOGEN UR QL STRIP: NORMAL

## 2021-08-04 PROCEDURE — 81003 URINALYSIS AUTO W/O SCOPE: CPT

## 2021-08-05 ENCOUNTER — TELEPHONE (OUTPATIENT)
Dept: FAMILY MEDICINE CLINIC | Age: 56
End: 2021-08-05

## 2021-08-05 NOTE — TELEPHONE ENCOUNTER
Pt states Mattie has been treating her for a yeast infection but she now has a rash/boil on her panty line. There are no available appointments for today. She would like a call back from a nurse to advise her on what she should do.

## 2021-09-21 ENCOUNTER — TELEPHONE (OUTPATIENT)
Dept: FAMILY MEDICINE CLINIC | Age: 56
End: 2021-09-21

## 2021-09-21 NOTE — TELEPHONE ENCOUNTER
Caller: Lissette Saavedra    Relationship: Self    Best call back number: [phone]     What medication are you requesting: DICLOFENAC 75 MG     If a prescription is needed, what is your preferred pharmacy and phone number: [facility] [address] [place] [address] [address] [facility] [phone] [facility] [phone] FX     Additional notes: PLEASE CALL AND ADVISE

## 2021-09-22 RX ORDER — DICLOFENAC SODIUM 75 MG/1
75 TABLET, DELAYED RELEASE ORAL 2 TIMES DAILY
Qty: 60 TABLET | Refills: 1 | Status: SHIPPED | OUTPATIENT
Start: 2021-09-22 | End: 2021-12-28 | Stop reason: SDUPTHER

## 2021-10-01 ENCOUNTER — APPOINTMENT (OUTPATIENT)
Dept: GENERAL RADIOLOGY | Facility: HOSPITAL | Age: 56
End: 2021-10-01

## 2021-10-01 ENCOUNTER — HOSPITAL ENCOUNTER (EMERGENCY)
Facility: HOSPITAL | Age: 56
Discharge: HOME | End: 2021-10-01
Attending: EMERGENCY MEDICINE | Admitting: EMERGENCY MEDICINE

## 2021-10-01 VITALS
HEART RATE: 96 BPM | RESPIRATION RATE: 11 BRPM | BODY MASS INDEX: 39.24 KG/M2 | DIASTOLIC BLOOD PRESSURE: 75 MMHG | TEMPERATURE: 99.9 F | WEIGHT: 250 LBS | HEIGHT: 67 IN | OXYGEN SATURATION: 93 % | SYSTOLIC BLOOD PRESSURE: 116 MMHG

## 2021-10-01 DIAGNOSIS — U07.1 PNEUMONIA DUE TO COVID-19 VIRUS: ICD-10-CM

## 2021-10-01 DIAGNOSIS — J12.82 PNEUMONIA DUE TO COVID-19 VIRUS: ICD-10-CM

## 2021-10-01 DIAGNOSIS — E86.0 DEHYDRATION: ICD-10-CM

## 2021-10-01 DIAGNOSIS — R19.7 DIARRHEA, UNSPECIFIED TYPE: ICD-10-CM

## 2021-10-01 DIAGNOSIS — R11.2 NON-INTRACTABLE VOMITING WITH NAUSEA, UNSPECIFIED VOMITING TYPE: ICD-10-CM

## 2021-10-01 DIAGNOSIS — U07.1 COVID-19: Primary | ICD-10-CM

## 2021-10-01 LAB
ALBUMIN SERPL-MCNC: 4.1 G/DL (ref 3.5–5.2)
ALBUMIN/GLOB SERPL: 1.1 G/DL
ALP SERPL-CCNC: 77 U/L (ref 39–117)
ALT SERPL W P-5'-P-CCNC: 37 U/L (ref 1–33)
ANION GAP SERPL CALCULATED.3IONS-SCNC: 16.2 MMOL/L (ref 5–15)
AST SERPL-CCNC: 43 U/L (ref 1–32)
BASOPHILS # BLD AUTO: 0.02 10*3/MM3 (ref 0–0.2)
BASOPHILS NFR BLD AUTO: 0.3 % (ref 0–1.5)
BILIRUB SERPL-MCNC: 0.6 MG/DL (ref 0–1.2)
BUN SERPL-MCNC: 11 MG/DL (ref 6–20)
BUN/CREAT SERPL: 9.9 (ref 7–25)
CALCIUM SPEC-SCNC: 9.1 MG/DL (ref 8.6–10.5)
CHLORIDE SERPL-SCNC: 103 MMOL/L (ref 98–107)
CO2 SERPL-SCNC: 19.8 MMOL/L (ref 22–29)
CREAT SERPL-MCNC: 1.11 MG/DL (ref 0.57–1)
DEPRECATED RDW RBC AUTO: 46.5 FL (ref 37–54)
EOSINOPHIL # BLD AUTO: 0.01 10*3/MM3 (ref 0–0.4)
EOSINOPHIL NFR BLD AUTO: 0.2 % (ref 0.3–6.2)
ERYTHROCYTE [DISTWIDTH] IN BLOOD BY AUTOMATED COUNT: 14.3 % (ref 12.3–15.4)
GFR SERPL CREATININE-BSD FRML MDRD: 51 ML/MIN/1.73
GLOBULIN UR ELPH-MCNC: 3.8 GM/DL
GLUCOSE SERPL-MCNC: 133 MG/DL (ref 65–99)
HCT VFR BLD AUTO: 51.2 % (ref 34–46.6)
HGB BLD-MCNC: 17.1 G/DL (ref 12–15.9)
HOLD SPECIMEN: NORMAL
HOLD SPECIMEN: NORMAL
IMM GRANULOCYTES # BLD AUTO: 0.01 10*3/MM3 (ref 0–0.05)
IMM GRANULOCYTES NFR BLD AUTO: 0.2 % (ref 0–0.5)
LIPASE SERPL-CCNC: 42 U/L (ref 13–60)
LYMPHOCYTES # BLD AUTO: 1.82 10*3/MM3 (ref 0.7–3.1)
LYMPHOCYTES NFR BLD AUTO: 31.4 % (ref 19.6–45.3)
MCH RBC QN AUTO: 29.6 PG (ref 26.6–33)
MCHC RBC AUTO-ENTMCNC: 33.4 G/DL (ref 31.5–35.7)
MCV RBC AUTO: 88.7 FL (ref 79–97)
MONOCYTES # BLD AUTO: 0.33 10*3/MM3 (ref 0.1–0.9)
MONOCYTES NFR BLD AUTO: 5.7 % (ref 5–12)
NEUTROPHILS NFR BLD AUTO: 3.61 10*3/MM3 (ref 1.7–7)
NEUTROPHILS NFR BLD AUTO: 62.2 % (ref 42.7–76)
NRBC BLD AUTO-RTO: 0 /100 WBC (ref 0–0.2)
PLATELET # BLD AUTO: 236 10*3/MM3 (ref 140–450)
PMV BLD AUTO: 11 FL (ref 6–12)
POTASSIUM SERPL-SCNC: 4 MMOL/L (ref 3.5–5.2)
PROT SERPL-MCNC: 7.9 G/DL (ref 6–8.5)
RBC # BLD AUTO: 5.77 10*6/MM3 (ref 3.77–5.28)
SODIUM SERPL-SCNC: 139 MMOL/L (ref 136–145)
WBC # BLD AUTO: 5.8 10*3/MM3 (ref 3.4–10.8)
WHOLE BLOOD HOLD SPECIMEN: NORMAL
WHOLE BLOOD HOLD SPECIMEN: NORMAL

## 2021-10-01 PROCEDURE — 83690 ASSAY OF LIPASE: CPT | Performed by: EMERGENCY MEDICINE

## 2021-10-01 PROCEDURE — 96375 TX/PRO/DX INJ NEW DRUG ADDON: CPT

## 2021-10-01 PROCEDURE — 85025 COMPLETE CBC W/AUTO DIFF WBC: CPT | Performed by: EMERGENCY MEDICINE

## 2021-10-01 PROCEDURE — 25010000002 DEXAMETHASONE PER 1 MG: Performed by: NURSE PRACTITIONER

## 2021-10-01 PROCEDURE — 96372 THER/PROPH/DIAG INJ SC/IM: CPT

## 2021-10-01 PROCEDURE — 99283 EMERGENCY DEPT VISIT LOW MDM: CPT

## 2021-10-01 PROCEDURE — 25010000002 DICYCLOMINE PER 20 MG: Performed by: NURSE PRACTITIONER

## 2021-10-01 PROCEDURE — 80053 COMPREHEN METABOLIC PANEL: CPT | Performed by: EMERGENCY MEDICINE

## 2021-10-01 PROCEDURE — 25010000002 ONDANSETRON PER 1 MG: Performed by: NURSE PRACTITIONER

## 2021-10-01 PROCEDURE — 71045 X-RAY EXAM CHEST 1 VIEW: CPT

## 2021-10-01 PROCEDURE — 96374 THER/PROPH/DIAG INJ IV PUSH: CPT

## 2021-10-01 RX ORDER — ALBUTEROL SULFATE 90 UG/1
2 AEROSOL, METERED RESPIRATORY (INHALATION) EVERY 4 HOURS PRN
Qty: 6.7 G | Refills: 0 | Status: SHIPPED | OUTPATIENT
Start: 2021-10-01 | End: 2021-12-28 | Stop reason: SDUPTHER

## 2021-10-01 RX ORDER — AZITHROMYCIN 250 MG/1
TABLET, FILM COATED ORAL
Qty: 6 TABLET | Refills: 0 | Status: SHIPPED | OUTPATIENT
Start: 2021-10-01 | End: 2021-12-28

## 2021-10-01 RX ORDER — ONDANSETRON 2 MG/ML
4 INJECTION INTRAMUSCULAR; INTRAVENOUS ONCE
Status: COMPLETED | OUTPATIENT
Start: 2021-10-01 | End: 2021-10-01

## 2021-10-01 RX ORDER — SODIUM CHLORIDE 0.9 % (FLUSH) 0.9 %
10 SYRINGE (ML) INJECTION AS NEEDED
Status: DISCONTINUED | OUTPATIENT
Start: 2021-10-01 | End: 2021-10-02 | Stop reason: HOSPADM

## 2021-10-01 RX ORDER — DEXAMETHASONE 4 MG/1
4 TABLET ORAL
Qty: 4 TABLET | Refills: 0 | Status: SHIPPED | OUTPATIENT
Start: 2021-10-01 | End: 2021-10-12

## 2021-10-01 RX ORDER — ACETAMINOPHEN 325 MG/1
975 TABLET ORAL ONCE
Status: COMPLETED | OUTPATIENT
Start: 2021-10-01 | End: 2021-10-01

## 2021-10-01 RX ORDER — AZITHROMYCIN 250 MG/1
500 TABLET, FILM COATED ORAL ONCE
Status: COMPLETED | OUTPATIENT
Start: 2021-10-01 | End: 2021-10-01

## 2021-10-01 RX ORDER — DICYCLOMINE HYDROCHLORIDE 10 MG/ML
20 INJECTION INTRAMUSCULAR ONCE
Status: COMPLETED | OUTPATIENT
Start: 2021-10-01 | End: 2021-10-01

## 2021-10-01 RX ORDER — ONDANSETRON 4 MG/1
4 TABLET, ORALLY DISINTEGRATING ORAL 4 TIMES DAILY PRN
Qty: 15 TABLET | Refills: 0 | Status: SHIPPED | OUTPATIENT
Start: 2021-10-01 | End: 2021-12-28

## 2021-10-01 RX ORDER — DICYCLOMINE HCL 20 MG
20 TABLET ORAL EVERY 6 HOURS
Qty: 20 TABLET | Refills: 0 | Status: SHIPPED | OUTPATIENT
Start: 2021-10-01 | End: 2021-10-12

## 2021-10-01 RX ORDER — DEXAMETHASONE SODIUM PHOSPHATE 10 MG/ML
8 INJECTION INTRAMUSCULAR; INTRAVENOUS ONCE
Status: COMPLETED | OUTPATIENT
Start: 2021-10-01 | End: 2021-10-01

## 2021-10-01 RX ADMIN — DEXAMETHASONE SODIUM PHOSPHATE 8 MG: 10 INJECTION INTRAMUSCULAR; INTRAVENOUS at 23:41

## 2021-10-01 RX ADMIN — DICYCLOMINE HYDROCHLORIDE 20 MG: 20 INJECTION, SOLUTION INTRAMUSCULAR at 20:45

## 2021-10-01 RX ADMIN — SODIUM CHLORIDE 500 ML: 9 INJECTION, SOLUTION INTRAVENOUS at 21:31

## 2021-10-01 RX ADMIN — SODIUM CHLORIDE 500 ML: 9 INJECTION, SOLUTION INTRAVENOUS at 20:45

## 2021-10-01 RX ADMIN — AZITHROMYCIN 500 MG: 250 TABLET, FILM COATED ORAL at 23:41

## 2021-10-01 RX ADMIN — ONDANSETRON 4 MG: 2 INJECTION INTRAMUSCULAR; INTRAVENOUS at 20:45

## 2021-10-01 RX ADMIN — ACETAMINOPHEN 975 MG: 325 TABLET ORAL at 22:06

## 2021-10-02 NOTE — ED PROVIDER NOTES
Subjective   The patient presents to the emergency department and states that she was diagnosed with Covid on 10 or 27.  She states since then she has had nausea vomiting and diarrhea.  She states that she has had no appetite.  She reports that she still is able to smell and taste certain things.  She states she had a mild cough but no shortness of breath.  She states she has been coughing up some phlegm but no hemoptysis.  She states she is had no blood or mucus in her stools.  She reports that mostly she is just spitting up but not vomiting.  She denies any abdominal pain or tenderness with palpation.  She does states that it is more of a cramping type diarrhea discomfort.  Her breath sounds are clear.  Her airway is patent.  She does not appear to be in any acute distress on exam.          Review of Systems   Constitutional: Positive for fatigue and fever. Negative for chills.   HENT: Negative for congestion, ear pain and sore throat.    Eyes: Negative for pain.   Respiratory: Positive for cough. Negative for chest tightness and shortness of breath.    Cardiovascular: Negative for chest pain.   Gastrointestinal: Positive for diarrhea, nausea and vomiting. Negative for abdominal pain.   Genitourinary: Negative for flank pain and hematuria.   Musculoskeletal: Positive for myalgias. Negative for joint swelling.   Skin: Negative for pallor.   Neurological: Negative for seizures and headaches.   All other systems reviewed and are negative.      Past Medical History:   Diagnosis Date   • Acute vaginitis    • Candidiasis of skin and nail    • Diarrhea    • Essential (primary) hypertension    • Family history of diabetes mellitus    • Hyperlipidemia    • Low back pain    • Menopausal and female climacteric states    • Other long term (current) drug therapy    • Other specified polyneuropathies    • Pain in left knee    • Varicose veins of left lower extremity with inflammation        Allergies   Allergen Reactions   •  Ace Inhibitors Unknown - High Severity   • Aspirin Unknown - High Severity   • Salicylates Swelling       Past Surgical History:   Procedure Laterality Date   • D & C HYSTEROSCOPY ENDOMETRIAL ABLATION     • FOOT SURGERY Bilateral    • FOREARM SURGERY Left     skin graft    • LAPAROSCOPIC CHOLECYSTECTOMY         Family History   Problem Relation Age of Onset   • Coronary artery disease Mother    • Osteoarthritis Mother    • Coronary artery disease Father    • Alzheimer's disease Father    • Diabetes type II Father        Social History     Socioeconomic History   • Marital status:      Spouse name: Not on file   • Number of children: 3   • Years of education: Not on file   • Highest education level: Not on file   Tobacco Use   • Smoking status: Never Smoker   • Smokeless tobacco: Never Used   Substance and Sexual Activity   • Alcohol use: Not Currently   • Drug use: Not Currently   • Sexual activity: Defer           Objective   Physical Exam  Vitals and nursing note reviewed.   Constitutional:       General: She is not in acute distress.     Appearance: Normal appearance. She is not toxic-appearing.   HENT:      Head: Normocephalic and atraumatic.   Cardiovascular:      Rate and Rhythm: Normal rate and regular rhythm.      Pulses: Normal pulses.   Pulmonary:      Effort: Pulmonary effort is normal. No respiratory distress.      Breath sounds: Normal breath sounds.   Abdominal:      General: Abdomen is flat.      Palpations: Abdomen is soft.      Tenderness: There is no abdominal tenderness.   Musculoskeletal:         General: Normal range of motion.      Cervical back: Normal range of motion and neck supple.   Skin:     General: Skin is warm and dry.      Capillary Refill: Capillary refill takes less than 2 seconds.   Neurological:      General: No focal deficit present.      Mental Status: She is alert and oriented to person, place, and time. Mental status is at baseline.   Psychiatric:         Mood and  Affect: Mood normal.         Procedures           ED Course  ED Course as of Oct 02 0612   Fri Oct 01, 2021   2249 The patient reports that she has had no episodes of nausea vomiting or diarrhea since she has had medications in the emergency department.    [TC]   2332 The patient is drinking fluids without nausea or vomiting.    [TC]      ED Course User Index  [TC] Tara Beyer APRN                                           MDM  Number of Diagnoses or Management Options  COVID-19: established and improving  Dehydration: minor  Diarrhea, unspecified type: minor  Non-intractable vomiting with nausea, unspecified vomiting type: minor  Pneumonia due to COVID-19 virus: new and requires workup     Amount and/or Complexity of Data Reviewed  Clinical lab tests: reviewed  Tests in the radiology section of CPT®: reviewed  Decide to obtain previous medical records or to obtain history from someone other than the patient: yes    Risk of Complications, Morbidity, and/or Mortality  Presenting problems: low  Diagnostic procedures: low  Management options: low    Patient Progress  Patient progress: stable      Final diagnoses:   COVID-19   Non-intractable vomiting with nausea, unspecified vomiting type   Diarrhea, unspecified type   Dehydration   Pneumonia due to COVID-19 virus       ED Disposition  ED Disposition     ED Disposition Condition Comment    Discharge Stable           Mattie Ontiveros, APRN  [address] [facility] [address]  [place] [address]  [phone]    In 2 days  FOR FOLLOW UP         Medication List      New Prescriptions    albuterol sulfate  (90 Base) MCG/ACT inhaler  Commonly known as: PROVENTIL HFA;VENTOLIN HFA;PROAIR HFA  Inhale 2 puffs Every 4 (Four) Hours As Needed for Wheezing.     azithromycin 250 MG tablet  Commonly known as: Zithromax Z-Frank  Take 2 tablets by mouth on day 1, then 1 tablet daily on days 2-5     dexamethasone 4 MG tablet  Commonly known as: DECADRON  Take 1 tablet by mouth  Daily With Breakfast.     dicyclomine 20 MG tablet  Commonly known as: BENTYL  Take 1 tablet by mouth Every 6 (Six) Hours.     ondansetron ODT 4 MG disintegrating tablet  Commonly known as: ZOFRAN-ODT  Place 1 tablet on the tongue 4 (Four) Times a Day As Needed for Nausea or Vomiting.           Where to Get Your Medications      These medications were sent to [facility] [address] [place] [address] [address]  [phone] [facility] [phone] [facility] [facility] [address]    Phone: [phone]   · albuterol sulfate  (90 Base) MCG/ACT inhaler  · azithromycin 250 MG tablet  · dexamethasone 4 MG tablet  · dicyclomine 20 MG tablet  · ondansetron ODT 4 MG disintegrating tablet          Tara Beyer APRN  10/02/21 0612

## 2021-10-02 NOTE — DISCHARGE INSTRUCTIONS
Rest, drink plenty of fluids.  Clear liquid diet for 24 hours and advance as tolerated to bland diet to your symptoms improve.  Take your meds as prescribed.  Continue to take over-the-counter acetaminophen and Motrin as needed for pain or fever.  According to the CDC guidelines you will need to continue to quarantine until such time you have a negative test result or your symptoms resolved.  Follow-up with EDDA Salter next week for further evaluation and treatment.  Return to the emergency department immediately for any acutely developing respiratory distress, any acutely developing abdominal pain, any persistent vomiting or any new or worse concerns.

## 2021-10-06 ENCOUNTER — TELEPHONE (OUTPATIENT)
Dept: FAMILY MEDICINE CLINIC | Age: 56
End: 2021-10-06

## 2021-10-06 NOTE — TELEPHONE ENCOUNTER
Caller: Lissette Saavedra    Relationship to patient: Self    Best call back number: [phone]    New or established patient?  [] New  [x] Established    Date of discharge: 10/01/2021    Facility discharged from: [facility]    Diagnosis/Symptoms: POSITIVE FOR COVID AND VERY WEAK. ER DIAGNOSED WITH A TOUCH OF PNEUMONIA. BOOKED FOR 10/12/21 WITH MOUSER BUT WILL NEED SOONER    Length of stay (If applicable): IN AND OUT SAME DAY 10/01/21    Specialty Only: Did you see a [facility] provider?    [] Yes  [x] No  If so, who?

## 2021-10-07 ENCOUNTER — TELEMEDICINE (OUTPATIENT)
Dept: FAMILY MEDICINE CLINIC | Age: 56
End: 2021-10-07

## 2021-10-07 DIAGNOSIS — R05.9 COUGH: Primary | ICD-10-CM

## 2021-10-07 NOTE — TELEPHONE ENCOUNTER
Spoke with patient no sooner openings for Mattie open for a follow up. Amrita Fournier had an open telehealth appointment for this afternoon. Patient scheduled and was informed how to check in

## 2021-10-08 ENCOUNTER — APPOINTMENT (OUTPATIENT)
Dept: GENERAL RADIOLOGY | Facility: HOSPITAL | Age: 56
End: 2021-10-08

## 2021-10-08 ENCOUNTER — HOSPITAL ENCOUNTER (EMERGENCY)
Facility: HOSPITAL | Age: 56
Discharge: HOME | End: 2021-10-08
Attending: EMERGENCY MEDICINE | Admitting: EMERGENCY MEDICINE

## 2021-10-08 ENCOUNTER — APPOINTMENT (OUTPATIENT)
Dept: CT IMAGING | Facility: HOSPITAL | Age: 56
End: 2021-10-08

## 2021-10-08 VITALS
OXYGEN SATURATION: 93 % | HEART RATE: 84 BPM | RESPIRATION RATE: 22 BRPM | DIASTOLIC BLOOD PRESSURE: 75 MMHG | HEIGHT: 67 IN | WEIGHT: 245.81 LBS | BODY MASS INDEX: 38.58 KG/M2 | SYSTOLIC BLOOD PRESSURE: 102 MMHG | TEMPERATURE: 98.7 F

## 2021-10-08 DIAGNOSIS — R06.02 SHORTNESS OF BREATH: ICD-10-CM

## 2021-10-08 DIAGNOSIS — J12.82 PNEUMONIA DUE TO COVID-19 VIRUS: Primary | ICD-10-CM

## 2021-10-08 DIAGNOSIS — U07.1 PNEUMONIA DUE TO COVID-19 VIRUS: Primary | ICD-10-CM

## 2021-10-08 PROBLEM — M72.2 PLANTAR FASCIITIS: Status: ACTIVE | Noted: 2021-10-08

## 2021-10-08 PROBLEM — E78.5 HYPERLIPIDEMIA: Status: ACTIVE | Noted: 2021-10-08

## 2021-10-08 PROBLEM — M10.9 GOUT: Status: ACTIVE | Noted: 2021-10-08

## 2021-10-08 PROBLEM — I10 HYPERTENSION: Status: ACTIVE | Noted: 2021-10-08

## 2021-10-08 PROBLEM — G57.60 MORTON'S NEUROMA: Status: ACTIVE | Noted: 2021-10-08

## 2021-10-08 LAB
ALBUMIN SERPL-MCNC: 3.8 G/DL (ref 3.5–5.2)
ALBUMIN/GLOB SERPL: 1.1 G/DL
ALP SERPL-CCNC: 91 U/L (ref 39–117)
ALT SERPL W P-5'-P-CCNC: 104 U/L (ref 1–33)
ANION GAP SERPL CALCULATED.3IONS-SCNC: 14.3 MMOL/L (ref 5–15)
AST SERPL-CCNC: 61 U/L (ref 1–32)
BASOPHILS # BLD AUTO: 0.06 10*3/MM3 (ref 0–0.2)
BASOPHILS NFR BLD AUTO: 0.6 % (ref 0–1.5)
BILIRUB SERPL-MCNC: 1.1 MG/DL (ref 0–1.2)
BUN SERPL-MCNC: 11 MG/DL (ref 6–20)
BUN/CREAT SERPL: 14.7 (ref 7–25)
CALCIUM SPEC-SCNC: 9.2 MG/DL (ref 8.6–10.5)
CHLORIDE SERPL-SCNC: 103 MMOL/L (ref 98–107)
CO2 SERPL-SCNC: 21.7 MMOL/L (ref 22–29)
CREAT SERPL-MCNC: 0.75 MG/DL (ref 0.57–1)
D DIMER PPP FEU-MCNC: 0.71 MG/L (FEU) (ref 0–0.59)
DEPRECATED RDW RBC AUTO: 44.6 FL (ref 37–54)
EOSINOPHIL # BLD AUTO: 0.18 10*3/MM3 (ref 0–0.4)
EOSINOPHIL NFR BLD AUTO: 1.9 % (ref 0.3–6.2)
ERYTHROCYTE [DISTWIDTH] IN BLOOD BY AUTOMATED COUNT: 13.9 % (ref 12.3–15.4)
GFR SERPL CREATININE-BSD FRML MDRD: 80 ML/MIN/1.73
GLOBULIN UR ELPH-MCNC: 3.6 GM/DL
GLUCOSE SERPL-MCNC: 115 MG/DL (ref 65–99)
HCT VFR BLD AUTO: 47.7 % (ref 34–46.6)
HGB BLD-MCNC: 16.1 G/DL (ref 12–15.9)
HOLD SPECIMEN: NORMAL
HOLD SPECIMEN: NORMAL
IMM GRANULOCYTES # BLD AUTO: 0.09 10*3/MM3 (ref 0–0.05)
IMM GRANULOCYTES NFR BLD AUTO: 0.9 % (ref 0–0.5)
LYMPHOCYTES # BLD AUTO: 2.67 10*3/MM3 (ref 0.7–3.1)
LYMPHOCYTES NFR BLD AUTO: 27.8 % (ref 19.6–45.3)
MCH RBC QN AUTO: 29.5 PG (ref 26.6–33)
MCHC RBC AUTO-ENTMCNC: 33.8 G/DL (ref 31.5–35.7)
MCV RBC AUTO: 87.5 FL (ref 79–97)
MONOCYTES # BLD AUTO: 1.04 10*3/MM3 (ref 0.1–0.9)
MONOCYTES NFR BLD AUTO: 10.8 % (ref 5–12)
NEUTROPHILS NFR BLD AUTO: 5.55 10*3/MM3 (ref 1.7–7)
NEUTROPHILS NFR BLD AUTO: 58 % (ref 42.7–76)
NRBC BLD AUTO-RTO: 0 /100 WBC (ref 0–0.2)
NT-PROBNP SERPL-MCNC: 55.8 PG/ML (ref 0–900)
PLATELET # BLD AUTO: 359 10*3/MM3 (ref 140–450)
PMV BLD AUTO: 10.5 FL (ref 6–12)
POTASSIUM SERPL-SCNC: 3.4 MMOL/L (ref 3.5–5.2)
PROT SERPL-MCNC: 7.4 G/DL (ref 6–8.5)
RBC # BLD AUTO: 5.45 10*6/MM3 (ref 3.77–5.28)
SODIUM SERPL-SCNC: 139 MMOL/L (ref 136–145)
TROPONIN T SERPL-MCNC: <0.01 NG/ML (ref 0–0.03)
WBC # BLD AUTO: 9.59 10*3/MM3 (ref 3.4–10.8)
WHOLE BLOOD HOLD SPECIMEN: NORMAL
WHOLE BLOOD HOLD SPECIMEN: NORMAL

## 2021-10-08 PROCEDURE — 0 IOPAMIDOL PER 1 ML: Performed by: EMERGENCY MEDICINE

## 2021-10-08 PROCEDURE — 25010000002 AZITHROMYCIN PER 500 MG: Performed by: EMERGENCY MEDICINE

## 2021-10-08 PROCEDURE — 93005 ELECTROCARDIOGRAM TRACING: CPT

## 2021-10-08 PROCEDURE — 85379 FIBRIN DEGRADATION QUANT: CPT | Performed by: EMERGENCY MEDICINE

## 2021-10-08 PROCEDURE — 99283 EMERGENCY DEPT VISIT LOW MDM: CPT

## 2021-10-08 PROCEDURE — 83880 ASSAY OF NATRIURETIC PEPTIDE: CPT | Performed by: EMERGENCY MEDICINE

## 2021-10-08 PROCEDURE — 71275 CT ANGIOGRAPHY CHEST: CPT

## 2021-10-08 PROCEDURE — 96365 THER/PROPH/DIAG IV INF INIT: CPT

## 2021-10-08 PROCEDURE — 25010000002 CEFTRIAXONE PER 250 MG: Performed by: EMERGENCY MEDICINE

## 2021-10-08 PROCEDURE — 96367 TX/PROPH/DG ADDL SEQ IV INF: CPT

## 2021-10-08 PROCEDURE — 25010000002 DEXAMETHASONE PER 1 MG: Performed by: EMERGENCY MEDICINE

## 2021-10-08 PROCEDURE — 93010 ELECTROCARDIOGRAM REPORT: CPT | Performed by: INTERNAL MEDICINE

## 2021-10-08 PROCEDURE — 71045 X-RAY EXAM CHEST 1 VIEW: CPT

## 2021-10-08 PROCEDURE — 96375 TX/PRO/DX INJ NEW DRUG ADDON: CPT

## 2021-10-08 PROCEDURE — 80053 COMPREHEN METABOLIC PANEL: CPT | Performed by: EMERGENCY MEDICINE

## 2021-10-08 PROCEDURE — 84484 ASSAY OF TROPONIN QUANT: CPT | Performed by: EMERGENCY MEDICINE

## 2021-10-08 PROCEDURE — 36415 COLL VENOUS BLD VENIPUNCTURE: CPT

## 2021-10-08 PROCEDURE — 85025 COMPLETE CBC W/AUTO DIFF WBC: CPT | Performed by: EMERGENCY MEDICINE

## 2021-10-08 PROCEDURE — 93005 ELECTROCARDIOGRAM TRACING: CPT | Performed by: EMERGENCY MEDICINE

## 2021-10-08 RX ORDER — ACETAMINOPHEN 500 MG
1000 TABLET ORAL ONCE
Status: COMPLETED | OUTPATIENT
Start: 2021-10-08 | End: 2021-10-08

## 2021-10-08 RX ORDER — DEXAMETHASONE SODIUM PHOSPHATE 10 MG/ML
10 INJECTION INTRAMUSCULAR; INTRAVENOUS ONCE
Status: COMPLETED | OUTPATIENT
Start: 2021-10-08 | End: 2021-10-08

## 2021-10-08 RX ORDER — CEFTRIAXONE SODIUM 1 G/50ML
1 INJECTION, SOLUTION INTRAVENOUS ONCE
Status: COMPLETED | OUTPATIENT
Start: 2021-10-08 | End: 2021-10-08

## 2021-10-08 RX ORDER — SODIUM CHLORIDE 0.9 % (FLUSH) 0.9 %
10 SYRINGE (ML) INJECTION AS NEEDED
Status: DISCONTINUED | OUTPATIENT
Start: 2021-10-08 | End: 2021-10-08 | Stop reason: HOSPADM

## 2021-10-08 RX ORDER — AZITHROMYCIN 250 MG/1
TABLET, FILM COATED ORAL
Qty: 6 TABLET | Refills: 0 | Status: SHIPPED | OUTPATIENT
Start: 2021-10-08 | End: 2021-10-12

## 2021-10-08 RX ORDER — PREDNISONE 20 MG/1
20 TABLET ORAL 2 TIMES DAILY
Qty: 14 TABLET | Refills: 0 | Status: SHIPPED | OUTPATIENT
Start: 2021-10-08 | End: 2021-12-28

## 2021-10-08 RX ADMIN — DEXAMETHASONE SODIUM PHOSPHATE 10 MG: 10 INJECTION INTRAMUSCULAR; INTRAVENOUS at 12:24

## 2021-10-08 RX ADMIN — IOPAMIDOL 100 ML: 755 INJECTION, SOLUTION INTRAVENOUS at 14:30

## 2021-10-08 RX ADMIN — AZITHROMYCIN 500 MG: 500 INJECTION, POWDER, LYOPHILIZED, FOR SOLUTION INTRAVENOUS at 13:24

## 2021-10-08 RX ADMIN — ACETAMINOPHEN 1000 MG: 500 TABLET ORAL at 12:22

## 2021-10-08 RX ADMIN — CEFTRIAXONE SODIUM 1 G: 1 INJECTION, SOLUTION INTRAVENOUS at 12:45

## 2021-10-08 NOTE — ED PROVIDER NOTES
Subjective   Patient presents with shortness of breath for the past 2 days.  She is known to be Covid positive as of proxy 1 week ago.  She denies exacerbating alleviating factors and also complains of body aches and chest discomfort.  She describes symptoms as moderate in severity and without exacerbating alleviating factors and gradual in onset.          Review of Systems   Constitutional: Negative for chills and fever.   HENT: Negative for congestion, ear pain and sore throat.    Eyes: Negative for pain.   Respiratory: Positive for cough, chest tightness and shortness of breath.    Cardiovascular: Negative for chest pain.   Gastrointestinal: Negative for abdominal pain, diarrhea, nausea and vomiting.   Genitourinary: Negative for flank pain and hematuria.   Musculoskeletal: Negative for joint swelling.   Skin: Negative for pallor.   Neurological: Negative for seizures and headaches.   All other systems reviewed and are negative.      Past Medical History:   Diagnosis Date   • Acute vaginitis    • Candidiasis of skin and nail    • Diarrhea    • Essential (primary) hypertension    • Family history of diabetes mellitus    • Hyperlipidemia    • Low back pain    • Menopausal and female climacteric states    • Other long term (current) drug therapy    • Other specified polyneuropathies    • Pain in left knee    • Varicose veins of left lower extremity with inflammation        Allergies   Allergen Reactions   • Ace Inhibitors Cough   • Aspirin Unknown - Low Severity   • Salicylates Swelling       Past Surgical History:   Procedure Laterality Date   • D & C HYSTEROSCOPY ENDOMETRIAL ABLATION     • FOOT SURGERY Bilateral    • FOREARM SURGERY Left     skin graft    • LAPAROSCOPIC CHOLECYSTECTOMY         Family History   Problem Relation Age of Onset   • Coronary artery disease Mother    • Osteoarthritis Mother    • Coronary artery disease Father    • Alzheimer's disease Father    • Diabetes type II Father        Social  History     Socioeconomic History   • Marital status:      Spouse name: Not on file   • Number of children: 3   • Years of education: Not on file   • Highest education level: Not on file   Tobacco Use   • Smoking status: Never Smoker   • Smokeless tobacco: Never Used   Vaping Use   • Vaping Use: Never used   Substance and Sexual Activity   • Alcohol use: Not Currently   • Drug use: Not Currently   • Sexual activity: Defer           Objective   Physical Exam  Constitutional:       Appearance: Normal appearance.   HENT:      Head: Normocephalic and atraumatic.      Nose: Nose normal.      Mouth/Throat:      Mouth: Mucous membranes are moist.   Eyes:      Extraocular Movements: Extraocular movements intact.      Conjunctiva/sclera: Conjunctivae normal.      Pupils: Pupils are equal, round, and reactive to light.   Cardiovascular:      Rate and Rhythm: Normal rate and regular rhythm.      Pulses: Normal pulses.      Heart sounds: Normal heart sounds.   Pulmonary:      Effort: Pulmonary effort is normal.      Breath sounds: Examination of the right-lower field reveals rhonchi. Examination of the left-lower field reveals rhonchi. Rhonchi present. No wheezing.   Abdominal:      Palpations: Abdomen is soft.      Tenderness: There is no abdominal tenderness.   Musculoskeletal:         General: Normal range of motion.      Cervical back: Normal range of motion and neck supple.      Right lower leg: No edema.      Left lower leg: No edema.   Skin:     General: Skin is warm and dry.      Capillary Refill: Capillary refill takes less than 2 seconds.      Findings: No rash.   Neurological:      General: No focal deficit present.      Mental Status: She is alert and oriented to person, place, and time. Mental status is at baseline.      Cranial Nerves: No cranial nerve deficit.      Sensory: No sensory deficit.      Motor: No weakness.   Psychiatric:         Mood and Affect: Mood normal.         Behavior: Behavior normal.          Procedures           ED Course                                           MDM  Number of Diagnoses or Management Options  Pneumonia due to COVID-19 virus  Diagnosis management comments: Patient presents complaining of shortness of air. Old records reviewed she has had prior visits for orthopedic related issues. Differential considerations include pneumonia versus Covid pneumonia versus pulmonary embolism. CT scan of the chest is read by radiology and reviewed by me demonstrates findings of pneumonia however no pulmonary embolism is just demonstrated. Oxygen saturations are appropriate for patient receiving currently outpatient basis. Antibiotics and steroids are prescribed. On reassessment she is stable and nontoxic in appearance with saturations 94% above.       Amount and/or Complexity of Data Reviewed  Clinical lab tests: reviewed  Tests in the radiology section of CPT®: reviewed  Tests in the medicine section of CPT®: reviewed    Risk of Complications, Morbidity, and/or Mortality  Presenting problems: high  Management options: high        Final diagnoses:   Pneumonia due to COVID-19 virus   Shortness of breath       ED Disposition  ED Disposition     ED Disposition Condition Comment    Discharge Stable           Mattie Ontiveros, EDDA  [address] [facility] [address]  [facility] [address]  [phone]      As needed    Mattie Ontiveros APRN  [address] [facility] [address]  [facility] [address]  [phone]      As needed         Medication List      New Prescriptions    predniSONE 20 MG tablet  Commonly known as: DELTASONE  Take 1 tablet by mouth 2 (Two) Times a Day.        Changed    * azithromycin 250 MG tablet  Commonly known as: Zithromax Z-Frank  Take 2 tablets by mouth on day 1, then 1 tablet daily on days 2-5  What changed: Another medication with the same name was added. Make sure you understand how and when to take each.     * azithromycin 250 MG tablet  Commonly known as: Zithromax Z-Frank  Take 2  tablets by mouth on day 1, then 1 tablet daily on days 2-5  What changed: You were already taking a medication with the same name, and this prescription was added. Make sure you understand how and when to take each.         * This list has 2 medication(s) that are the same as other medications prescribed for you. Read the directions carefully, and ask your doctor or other care provider to review them with you.               Where to Get Your Medications      These medications were sent to [facility] [address] [place] [address] [place] - [address]  [phone] [facility] [phone]   [address] [place] [address]    Phone: [phone]   · azithromycin 250 MG tablet  · predniSONE 20 MG tablet          Bharat Marsh MD  10/08/21 0303

## 2021-10-12 ENCOUNTER — OFFICE VISIT (OUTPATIENT)
Dept: FAMILY MEDICINE CLINIC | Age: 56
End: 2021-10-12

## 2021-10-12 VITALS
SYSTOLIC BLOOD PRESSURE: 134 MMHG | OXYGEN SATURATION: 97 % | DIASTOLIC BLOOD PRESSURE: 97 MMHG | WEIGHT: 255.4 LBS | HEART RATE: 110 BPM | TEMPERATURE: 96.7 F | BODY MASS INDEX: 40 KG/M2

## 2021-10-12 DIAGNOSIS — R11.0 NAUSEA: ICD-10-CM

## 2021-10-12 DIAGNOSIS — U07.1 PNEUMONIA DUE TO COVID-19 VIRUS: Primary | ICD-10-CM

## 2021-10-12 DIAGNOSIS — J12.82 PNEUMONIA DUE TO COVID-19 VIRUS: Primary | ICD-10-CM

## 2021-10-12 PROCEDURE — 99214 OFFICE O/P EST MOD 30 MIN: CPT | Performed by: NURSE PRACTITIONER

## 2021-10-12 RX ORDER — ALBUTEROL SULFATE 2.5 MG/3ML
2.5 SOLUTION RESPIRATORY (INHALATION) EVERY 4 HOURS PRN
Qty: 24 EACH | Refills: 0 | Status: SHIPPED | OUTPATIENT
Start: 2021-10-12 | End: 2022-02-18

## 2021-10-12 RX ORDER — PROMETHAZINE HYDROCHLORIDE 12.5 MG/1
12.5 TABLET ORAL EVERY 6 HOURS PRN
Qty: 20 TABLET | Refills: 1 | Status: SHIPPED | OUTPATIENT
Start: 2021-10-12 | End: 2021-11-01

## 2021-10-12 NOTE — PROGRESS NOTES
Chief Complaint  Lissette Saavedra presents to [facility] for Hospital Follow Up Visit ([facility] ED 10/1 & 10/8) and Pneumonia (due to covid)    Subjective          Lissette is here for follow up on covid pneumonia - diagnosed on 9/27 with covid  Seen in [facility] ER on 10/1 and 10/8 with shortness of breath.  Xray showed infiltrates, started on Prednisone and zpack  She reports taht she is feeling better, but still with cough        Review of Systems      Allergies   Allergen Reactions   • Ace Inhibitors Cough   • Aspirin Unknown - Low Severity   • Salicylates Swelling      Past Medical History:   Diagnosis Date   • Acute vaginitis    • Candidiasis of skin and nail    • Diarrhea    • Essential (primary) hypertension    • Family history of diabetes mellitus    • Hyperlipidemia    • Low back pain    • Menopausal and female climacteric states    • Other long term (current) drug therapy    • Other specified polyneuropathies    • Pain in left knee    • Varicose veins of left lower extremity with inflammation      Current Outpatient Medications   Medication Sig Dispense Refill   • albuterol sulfate  (90 Base) MCG/ACT inhaler Inhale 2 puffs Every 4 (Four) Hours As Needed for Wheezing. 6.7 g 0   • azithromycin (Zithromax Z-Frank) 250 MG tablet Take 2 tablets by mouth on day 1, then 1 tablet daily on days 2-5 6 tablet 0   • diclofenac (VOLTAREN) 75 MG EC tablet Take 1 tablet by mouth 2 (Two) Times a Day. 60 tablet 1   • gabapentin (NEURONTIN) 300 MG capsule Take 300 mg by mouth 3 (Three) Times a Day.     • ondansetron ODT (ZOFRAN-ODT) 4 MG disintegrating tablet Place 1 tablet on the tongue 4 (Four) Times a Day As Needed for Nausea or Vomiting. 15 tablet 0   • predniSONE (DELTASONE) 20 MG tablet Take 1 tablet by mouth 2 (Two) Times a Day. 14 tablet 0   • albuterol (PROVENTIL) (2.5 MG/3ML) 0.083% nebulizer solution Take 2.5 mg by nebulization Every 4 (Four) Hours As Needed for Wheezing. 24  each 0   • promethazine (PHENERGAN) 12.5 MG tablet Take 1 tablet by mouth Every 6 (Six) Hours As Needed for Nausea or Vomiting for up to 20 days. 20 tablet 1     No current facility-administered medications for this visit.     Past Surgical History:   Procedure Laterality Date   • D & C HYSTEROSCOPY ENDOMETRIAL ABLATION     • FOOT SURGERY Bilateral    • FOREARM SURGERY Left     skin graft    • LAPAROSCOPIC CHOLECYSTECTOMY        Social History     Tobacco Use   • Smoking status: Never Smoker   • Smokeless tobacco: Never Used   Vaping Use   • Vaping Use: Never used   Substance Use Topics   • Alcohol use: Not Currently   • Drug use: Not Currently     Family History   Problem Relation Age of Onset   • Coronary artery disease Mother    • Osteoarthritis Mother    • Coronary artery disease Father    • Alzheimer's disease Father    • Diabetes type II Father      Health Maintenance Due   Topic Date Due   • COLORECTAL CANCER SCREENING  Never done   • COVID-19 Vaccine (1) Never done   • TDAP/TD VACCINES (2 - Tdap) 05/07/2013   • ZOSTER VACCINE (1 of 2) Never done   • MAMMOGRAM  10/02/2017   • LIPID PANEL  07/16/2021   • INFLUENZA VACCINE  08/01/2021      Immunization History   Administered Date(s) Administered   • Flu Vaccine Intradermal Quad 18-64YR 11/25/2020   • Td 05/07/2003        Objective     Vitals:    10/12/21 1051   BP: 134/97   Pulse: 110   Temp: 96.7 °F (35.9 °C)   SpO2: 97%   Weight: 116 kg (255 lb 6.4 oz)     Body mass index is 40 kg/m².     Physical Exam  Constitutional:       General: She is not in acute distress.     Appearance: Normal appearance. She is obese. She is ill-appearing.   HENT:      Head: Normocephalic.   Cardiovascular:      Rate and Rhythm: Normal rate and regular rhythm.   Pulmonary:      Effort: Pulmonary effort is normal.      Breath sounds: Wheezing (cleared with cough) present.   Musculoskeletal:         General: Normal range of motion.   Neurological:      General: No focal deficit  present.      Mental Status: She is alert and oriented to person, place, and time.   Psychiatric:         Mood and Affect: Mood normal.         Behavior: Behavior normal.           Result Review :                               Assessment and Plan      Diagnoses and all orders for this visit:    1. Pneumonia due to COVID-19 virus (Primary)  Comments:  continue curent treatment, nebs prescribed today. REcommend continue to advance activity as tolerated ;  Vitamin D3, zince, deep breathing  Orders:  -     albuterol (PROVENTIL) (2.5 MG/3ML) 0.083% nebulizer solution; Take 2.5 mg by nebulization Every 4 (Four) Hours As Needed for Wheezing.  Dispense: 24 each; Refill: 0    2. Nausea  Comments:  zofran PRN   Orders:  -     promethazine (PHENERGAN) 12.5 MG tablet; Take 1 tablet by mouth Every 6 (Six) Hours As Needed for Nausea or Vomiting for up to 20 days.  Dispense: 20 tablet; Refill: 1              Follow Up     Return if symptoms worsen or fail to improve, for Next scheduled follow up.

## 2021-10-29 LAB — QT INTERVAL: 378 MS

## 2021-12-28 ENCOUNTER — OFFICE VISIT (OUTPATIENT)
Dept: FAMILY MEDICINE CLINIC | Age: 56
End: 2021-12-28

## 2021-12-28 VITALS
HEIGHT: 67 IN | HEART RATE: 88 BPM | BODY MASS INDEX: 42.22 KG/M2 | DIASTOLIC BLOOD PRESSURE: 69 MMHG | SYSTOLIC BLOOD PRESSURE: 139 MMHG | WEIGHT: 269 LBS

## 2021-12-28 DIAGNOSIS — J06.9 ACUTE URI: Primary | ICD-10-CM

## 2021-12-28 LAB
EXPIRATION DATE: NORMAL
FLUAV AG UPPER RESP QL IA.RAPID: NOT DETECTED
FLUBV AG UPPER RESP QL IA.RAPID: NOT DETECTED
INTERNAL CONTROL: NORMAL
Lab: NORMAL
SARS-COV-2 AG UPPER RESP QL IA.RAPID: NOT DETECTED

## 2021-12-28 PROCEDURE — 87428 SARSCOV & INF VIR A&B AG IA: CPT | Performed by: NURSE PRACTITIONER

## 2021-12-28 PROCEDURE — 99213 OFFICE O/P EST LOW 20 MIN: CPT | Performed by: NURSE PRACTITIONER

## 2021-12-28 RX ORDER — CEFDINIR 300 MG/1
300 CAPSULE ORAL 2 TIMES DAILY
Qty: 20 CAPSULE | Refills: 0 | Status: SHIPPED | OUTPATIENT
Start: 2021-12-28 | End: 2022-02-18

## 2021-12-28 RX ORDER — GABAPENTIN 100 MG/1
100 CAPSULE ORAL DAILY
COMMUNITY
End: 2022-07-07

## 2021-12-28 RX ORDER — GUAIFENESIN AND CODEINE PHOSPHATE 100; 10 MG/5ML; MG/5ML
10 SOLUTION ORAL 3 TIMES DAILY PRN
Qty: 118 ML | Refills: 0 | Status: SHIPPED | OUTPATIENT
Start: 2021-12-28 | End: 2022-02-18 | Stop reason: SDUPTHER

## 2021-12-28 RX ORDER — DICLOFENAC SODIUM 75 MG/1
75 TABLET, DELAYED RELEASE ORAL 2 TIMES DAILY
Qty: 60 TABLET | Refills: 1 | Status: SHIPPED | OUTPATIENT
Start: 2021-12-28 | End: 2022-02-18 | Stop reason: SDUPTHER

## 2021-12-28 RX ORDER — ALBUTEROL SULFATE 90 UG/1
2 AEROSOL, METERED RESPIRATORY (INHALATION) EVERY 4 HOURS PRN
Qty: 6.7 G | Refills: 0 | Status: SHIPPED | OUTPATIENT
Start: 2021-12-28 | End: 2022-11-11

## 2021-12-28 RX ORDER — PREDNISONE 20 MG/1
40 TABLET ORAL DAILY
Qty: 10 TABLET | Refills: 0 | Status: SHIPPED | OUTPATIENT
Start: 2021-12-28 | End: 2022-02-18

## 2021-12-28 NOTE — PROGRESS NOTES
"Chief Complaint  Cough (x 5 days)    Subjective          Lissette Saavedra presents to [facility] for 5 day hx of productive cough.  No known ill contacts.  Patient denies fever, chills, nausea and vomiting, diarrhea or chest pain.  She does have some mild shortness of air with activity.  She is needing refills on her albuterol inhaler.  Also needing refill on diclofenac.  Has not lost taste or smell.  She states cough is so bad at night she is not able to go to work.          Objective   Vital Signs:   /69   Pulse 88   Ht 170.2 cm (67.01\")   Wt 122 kg (269 lb)   BMI 42.12 kg/m²     Physical Exam  Vitals reviewed.   Constitutional:       General: She is not in acute distress.     Appearance: Normal appearance. She is well-developed. She is obese. She is ill-appearing.   HENT:      Head: Normocephalic and atraumatic.   Eyes:      Conjunctiva/sclera: Conjunctivae normal.      Pupils: Pupils are equal, round, and reactive to light.   Cardiovascular:      Rate and Rhythm: Normal rate and regular rhythm.      Heart sounds: No murmur heard.      Pulmonary:      Effort: Pulmonary effort is normal. No respiratory distress.      Breath sounds: Wheezing and rhonchi present. No rales.   Abdominal:      General: There is no distension.      Tenderness: There is no abdominal tenderness.   Musculoskeletal:      Cervical back: Full passive range of motion without pain.      Right lower leg: No edema.      Left lower leg: No edema.   Skin:     General: Skin is warm and dry.   Neurological:      Mental Status: She is alert and oriented to person, place, and time.   Psychiatric:         Mood and Affect: Mood and affect normal.         Behavior: Behavior normal.         Thought Content: Thought content normal.         Judgment: Judgment normal.          Result Review :              Assessment and Plan    Diagnoses and all orders for this visit:    1. Acute URI (Primary)  -     POCT " SARS-CoV-2 Antigen MAUREEN + Flu  -     guaiFENesin-codeine (GUAIFENESIN AC) 100-10 MG/5ML liquid; Take 10 mL by mouth 3 (Three) Times a Day As Needed for Cough.  Dispense: 118 mL; Refill: 0    Other orders  -     cefdinir (OMNICEF) 300 MG capsule; Take 1 capsule by mouth 2 (Two) Times a Day.  Dispense: 20 capsule; Refill: 0  -     predniSONE (DELTASONE) 20 MG tablet; Take 2 tablets by mouth Daily.  Dispense: 10 tablet; Refill: 0  -     albuterol sulfate  (90 Base) MCG/ACT inhaler; Inhale 2 puffs by mouth Every 4 (Four) Hours As Needed for Wheezing.  Dispense: 6.7 g; Refill: 0  -     diclofenac (VOLTAREN) 75 MG EC tablet; Take 1 tablet by mouth 2 (Two) Times a Day.  Dispense: 60 tablet; Refill: 1    Increase fluid intake and rest.  Tylenol/ibuprofen for discomfort/fever.  Complete prescribed antibiotic and steroid.  Zyxal and Flonase daily.  Potential side effects of medication discussed.  Notify office with any acute concerns or issues.  Follow-up as needed.  Patient to go to ED with any chest pain or shortness of air.  Patient verbalizes understanding, agrees to plan of care and has no further questions upon discharge.    Please note that portions of this note were completed with a voice recognition program.    Follow Up    Return if symptoms worsen or fail to improve.  Patient was given instructions and counseling regarding her condition or for health maintenance advice. Please see specific information pulled into the AVS if appropriate.

## 2022-02-18 ENCOUNTER — HOSPITAL ENCOUNTER (OUTPATIENT)
Dept: GENERAL RADIOLOGY | Facility: HOSPITAL | Age: 57
Discharge: HOME | End: 2022-02-18
Admitting: NURSE PRACTITIONER

## 2022-02-18 ENCOUNTER — OFFICE VISIT (OUTPATIENT)
Dept: FAMILY MEDICINE CLINIC | Age: 57
End: 2022-02-18

## 2022-02-18 VITALS
HEIGHT: 67 IN | SYSTOLIC BLOOD PRESSURE: 135 MMHG | DIASTOLIC BLOOD PRESSURE: 79 MMHG | OXYGEN SATURATION: 96 % | HEART RATE: 87 BPM | BODY MASS INDEX: 42.22 KG/M2 | WEIGHT: 269 LBS

## 2022-02-18 DIAGNOSIS — R05.3 CHRONIC COUGH: ICD-10-CM

## 2022-02-18 DIAGNOSIS — M19.90 ARTHRITIS: Primary | ICD-10-CM

## 2022-02-18 PROCEDURE — 99213 OFFICE O/P EST LOW 20 MIN: CPT | Performed by: NURSE PRACTITIONER

## 2022-02-18 PROCEDURE — 71046 X-RAY EXAM CHEST 2 VIEWS: CPT

## 2022-02-18 RX ORDER — DICLOFENAC SODIUM 75 MG/1
75 TABLET, DELAYED RELEASE ORAL 2 TIMES DAILY
Qty: 60 TABLET | Refills: 0 | Status: SHIPPED | OUTPATIENT
Start: 2022-02-18 | End: 2022-04-06 | Stop reason: SDUPTHER

## 2022-02-18 RX ORDER — METHYLPREDNISOLONE 4 MG/1
TABLET ORAL
Qty: 21 TABLET | Refills: 0 | Status: SHIPPED | OUTPATIENT
Start: 2022-02-18 | End: 2022-04-04

## 2022-02-18 RX ORDER — GUAIFENESIN AND CODEINE PHOSPHATE 100; 10 MG/5ML; MG/5ML
10 SOLUTION ORAL 3 TIMES DAILY PRN
Qty: 118 ML | Refills: 0 | Status: SHIPPED | OUTPATIENT
Start: 2022-02-18 | End: 2022-02-22 | Stop reason: SDUPTHER

## 2022-02-18 RX ORDER — MONTELUKAST SODIUM 10 MG/1
10 TABLET ORAL NIGHTLY
Qty: 30 TABLET | Refills: 2 | Status: SHIPPED | OUTPATIENT
Start: 2022-02-18 | End: 2022-04-04

## 2022-02-18 NOTE — PROGRESS NOTES
"Chief Complaint  Cough (patient states she has had ongoing cough since she had covid on 9/27/2021)    Subjective  Patient is a 56-year-old female that is here today for follow-up regarding chronic cough that started in September when she was positive for COVID-19.  Patchy opacities in the lungs were noted on chest imaging in October.  Had steroids and September in December and cefdinir in December.  Uses albuterol about 3 times per day denies any fever.  States the cough is worse at bedtime and severely interrupts her sleep and less she has used a cough medicine with codeine.  She states she uses it only sparingly.  It was last received in December.  Current antihistamine is Xyzal.  She takes coughing spells throughout the day.    Also states she needs a refill of diclofenac for her arthritis pain that affects her hands and knees.  Denies any stomach upset with use of diclofenac         Lissette Saavedra presents to [facility]    Review of Systems   Constitutional: Positive for fatigue. Negative for chills and fever.   HENT: Positive for postnasal drip. Negative for sinus pressure.    Respiratory: Positive for cough and shortness of breath. Negative for wheezing.    Cardiovascular: Negative.         Objective   Vital Signs:   Vitals:    02/18/22 1614   BP: 135/79   BP Location: Left arm   Patient Position: Sitting   Cuff Size: Large Adult   Pulse: 87   SpO2: 96%   Weight: 122 kg (269 lb)   Height: 170.2 cm (67\")      Physical Exam  Vitals reviewed.   Constitutional:       General: She is not in acute distress.     Appearance: Normal appearance. She is well-developed.   HENT:      Right Ear: A middle ear effusion is present.      Left Ear: A middle ear effusion is present.      Mouth/Throat:      Mouth: Mucous membranes are dry.      Pharynx: Oropharynx is clear.   Cardiovascular:      Rate and Rhythm: Normal rate and regular rhythm.      Heart sounds: Normal heart sounds. "   Pulmonary:      Effort: Pulmonary effort is normal.      Breath sounds: Normal breath sounds.   Musculoskeletal:      Right lower leg: No edema.      Left lower leg: No edema.   Skin:     General: Skin is warm and dry.   Neurological:      General: No focal deficit present.      Mental Status: She is alert.   Psychiatric:         Attention and Perception: Attention normal.         Mood and Affect: Mood and affect normal.         Behavior: Behavior normal.          Result Review :                Assessment and Plan    Diagnoses and all orders for this visit:    1. Arthritis (Primary)  Assessment & Plan:  1 courtesy refill to be provided today.  Future refills need to come from primary care provider.  Remind her to take it with food to avoid  stomach upset    Orders:  -     diclofenac (VOLTAREN) 75 MG EC tablet; Take 1 tablet by mouth 2 (Two) Times a Day.  Dispense: 60 tablet; Refill: 0    2. Chronic cough  Assessment & Plan:  I do not recommend using steroids often as they can suppress immune system.  I can refill her guaifenesin with codeine one time.  Caution with use.  Start Singulair and further treatment pending chest x-ray results.  Consider a long-acting inhaler for control of symptoms.    Orders:  -     XR Chest PA & Lateral; Future  -     montelukast (Singulair) 10 MG tablet; Take 1 tablet by mouth Every Night.  Dispense: 30 tablet; Refill: 2  -     guaiFENesin-codeine (GUAIFENESIN AC) 100-10 MG/5ML liquid; Take 10 mL by mouth 3 (Three) Times a Day As Needed for Cough.  Dispense: 118 mL; Refill: 0  -     methylPREDNISolone (MEDROL) 4 MG dose pack; Take as directed on package instructions.  Dispense: 21 tablet; Refill: 0      Follow Up    No follow-ups on file.  Patient was given instructions and counseling regarding her condition or for health maintenance advice. Please see specific information pulled into the AVS if appropriate.

## 2022-02-18 NOTE — ASSESSMENT & PLAN NOTE
1 courtesy refill to be provided today.  Future refills need to come from primary care provider.  Remind her to take it with food to avoid  stomach upset

## 2022-02-18 NOTE — ASSESSMENT & PLAN NOTE
I do not recommend using steroids often as they can suppress immune system.  I can refill her guaifenesin with codeine one time.  Caution with use.  Start Singulair and further treatment pending chest x-ray results.  Consider a long-acting inhaler for control of symptoms.

## 2022-02-21 ENCOUNTER — TELEPHONE (OUTPATIENT)
Dept: FAMILY MEDICINE CLINIC | Age: 57
End: 2022-02-21

## 2022-02-21 DIAGNOSIS — R05.3 CHRONIC COUGH: Primary | ICD-10-CM

## 2022-02-21 RX ORDER — DOXYCYCLINE HYCLATE 100 MG/1
100 CAPSULE ORAL 2 TIMES DAILY
Qty: 14 CAPSULE | Refills: 0 | Status: SHIPPED | OUTPATIENT
Start: 2022-02-21 | End: 2022-02-22 | Stop reason: SDUPTHER

## 2022-02-21 NOTE — TELEPHONE ENCOUNTER
Caller: Lissette Saavedra    Relationship: Self    Best call back number: [phone]    What is the best time to reach you: ANYTIME     Who are you requesting to speak with (clinical staff, provider,  specific staff member): NURSE       What was the call regarding: PATIENT IS RETURNING A CALL, HOPING IT IS ABOUT HER XRAY FROM LAST WEEK.  PLEASE CALL HER BACK     Do you require a callback: YES

## 2022-02-21 NOTE — PROGRESS NOTES
Improved compared to October but opacity not completely resolved.  To cover any potential bacterial cause,  doxycycline has been sent to [facility].

## 2022-02-21 NOTE — TELEPHONE ENCOUNTER
Caller: Lissette Saavedra    Relationship: Self    Best call back number: [phone]    Requested Prescriptions:   guaiFENesin-codeine (GUAIFENESIN AC) 100-10 MG/5ML liquid    Pharmacy where request should be sent: [facility] - [place]     Additional details provided by patient:   [facility]. UNFORTUNATELY, [facility] IS NO LONGER FILLING THIS MEDICATION ANYMORE, SO SHE IS REQUESTING THIS BE SENT TO PHARMACY IN [facility] SO SHE CAN BEGIN THIS MEDICATION TOMORROW, 02/21/2022. SHE IS REQUESTING CALL WHEN SENT.       Ravinder Antonio Rep   02/21/22 16:42 EST

## 2022-02-22 DIAGNOSIS — R05.3 CHRONIC COUGH: ICD-10-CM

## 2022-02-22 RX ORDER — GUAIFENESIN AND CODEINE PHOSPHATE 100; 10 MG/5ML; MG/5ML
10 SOLUTION ORAL 3 TIMES DAILY PRN
Qty: 118 ML | Refills: 0 | Status: SHIPPED | OUTPATIENT
Start: 2022-02-22 | End: 2022-04-04

## 2022-02-22 RX ORDER — DOXYCYCLINE HYCLATE 100 MG/1
100 CAPSULE ORAL 2 TIMES DAILY
Qty: 14 CAPSULE | Refills: 0 | Status: SHIPPED | OUTPATIENT
Start: 2022-02-22 | End: 2022-02-22

## 2022-02-22 RX ORDER — GUAIFENESIN AND CODEINE PHOSPHATE 100; 10 MG/5ML; MG/5ML
10 SOLUTION ORAL 3 TIMES DAILY PRN
Qty: 118 ML | Refills: 0 | Status: SHIPPED | OUTPATIENT
Start: 2022-02-22 | End: 2022-02-22 | Stop reason: SDUPTHER

## 2022-02-22 NOTE — TELEPHONE ENCOUNTER
[facility] had the Rx billed. We called them and they said they will call pt and tell her she can  today. They have in stock. Sounds like their was some miscommunication with the pt previously.

## 2022-04-04 ENCOUNTER — OFFICE VISIT (OUTPATIENT)
Dept: FAMILY MEDICINE CLINIC | Age: 57
End: 2022-04-04

## 2022-04-04 ENCOUNTER — HOSPITAL ENCOUNTER (OUTPATIENT)
Dept: GENERAL RADIOLOGY | Facility: HOSPITAL | Age: 57
Discharge: HOME | End: 2022-04-04
Admitting: NURSE PRACTITIONER

## 2022-04-04 VITALS
HEART RATE: 86 BPM | HEIGHT: 67 IN | OXYGEN SATURATION: 99 % | TEMPERATURE: 98 F | BODY MASS INDEX: 40.65 KG/M2 | SYSTOLIC BLOOD PRESSURE: 123 MMHG | WEIGHT: 259 LBS | DIASTOLIC BLOOD PRESSURE: 71 MMHG

## 2022-04-04 DIAGNOSIS — R05.9 COUGH: ICD-10-CM

## 2022-04-04 DIAGNOSIS — R05.9 COUGH: Primary | ICD-10-CM

## 2022-04-04 PROCEDURE — 99213 OFFICE O/P EST LOW 20 MIN: CPT | Performed by: NURSE PRACTITIONER

## 2022-04-04 PROCEDURE — 71046 X-RAY EXAM CHEST 2 VIEWS: CPT

## 2022-04-04 RX ORDER — METHYLPREDNISOLONE 4 MG/1
TABLET ORAL
Qty: 21 EACH | Refills: 0 | Status: SHIPPED | OUTPATIENT
Start: 2022-04-04 | End: 2022-07-07

## 2022-04-04 RX ORDER — MONTELUKAST SODIUM 10 MG/1
10 TABLET ORAL NIGHTLY
COMMUNITY
End: 2022-12-12 | Stop reason: SDUPTHER

## 2022-04-04 RX ORDER — DEXTROMETHORPHAN HYDROBROMIDE AND PROMETHAZINE HYDROCHLORIDE 15; 6.25 MG/5ML; MG/5ML
5 SYRUP ORAL 4 TIMES DAILY PRN
Qty: 473 ML | Refills: 0 | Status: SHIPPED | OUTPATIENT
Start: 2022-04-04 | End: 2022-07-07

## 2022-04-04 NOTE — PROGRESS NOTES
"Chief Complaint  Cough (Pt c/o ongoing cough. Pt states that she was given some prednisone on 2/18/22 for this cough, medication helped, but cough has now came back progressively.)    Subjective          Lissette Saavedra presents to [facility]  Cough  This is a recurrent problem. The current episode started more than 1 month ago. The problem has been gradually worsening. The cough is non-productive. Associated symptoms include headaches. Pertinent negatives include no chest pain, chills, ear congestion, ear pain, fever, hemoptysis, myalgias, nasal congestion, postnasal drip, rhinorrhea, sore throat, shortness of breath or wheezing. Exacerbated by: hot air. She has tried prescription cough suppressant and oral steroids for the symptoms. The treatment provided mild relief. There is no history of asthma or COPD.       Objective   Vital Signs:   /71 (BP Location: Left arm, Patient Position: Sitting)   Pulse 86   Temp 98 °F (36.7 °C) (Oral)   Ht 170.2 cm (67\")   Wt 117 kg (259 lb)   SpO2 99% Comment: room air  BMI 40.57 kg/m²     Physical Exam  Constitutional:       General: She is not in acute distress.     Appearance: Normal appearance. She is obese.   HENT:      Head: Normocephalic.   Eyes:      Pupils: Pupils are equal, round, and reactive to light.      Visual Fields: Right eye visual fields normal and left eye visual fields normal.   Neck:      Trachea: Trachea normal.   Cardiovascular:      Rate and Rhythm: Normal rate and regular rhythm.      Heart sounds: Normal heart sounds.   Pulmonary:      Effort: Pulmonary effort is normal.      Breath sounds: Normal breath sounds and air entry. No wheezing, rhonchi or rales.   Musculoskeletal:      Right lower leg: No edema.      Left lower leg: No edema.   Skin:     General: Skin is warm and dry.   Neurological:      Mental Status: She is alert and oriented to person, place, and time.   Psychiatric:         Mood and Affect: " Mood and affect normal.         Behavior: Behavior normal.         Thought Content: Thought content normal.        Result Review :   The following data was reviewed by: EDDA Thomas on 04/04/2022:  XR Chest PA & Lateral (02/18/2022 16:46)                    Assessment and Plan    Diagnoses and all orders for this visit:    1. Cough (Primary)  -     XR Chest PA & Lateral; Future  -     methylPREDNISolone (MEDROL) 4 MG dose pack; Take as directed on package instructions.  Dispense: 21 each; Refill: 0  -     promethazine-dextromethorphan (PROMETHAZINE-DM) 6.25-15 MG/5ML syrup; Take 5 mL by mouth 4 (Four) Times a Day As Needed for Cough.  Dispense: 473 mL; Refill: 0    He has a dry cough.  We will send in steroid pack and Promethazine DM.  We will send for chest x-ray, and if it shows pneumonia, will send in an antibiotic.      Follow Up   Return if symptoms worsen or fail to improve.  Patient was given instructions and counseling regarding her condition or for health maintenance advice. Please see specific information pulled into the AVS if appropriate.

## 2022-04-06 DIAGNOSIS — M19.90 ARTHRITIS: ICD-10-CM

## 2022-04-06 NOTE — TELEPHONE ENCOUNTER
Caller: Lissette Saavedra    Relationship: Self    Best call back number: [phone]    Requested Prescriptions:   Requested Prescriptions     Pending Prescriptions Disp Refills   • diclofenac (VOLTAREN) 75 MG EC tablet 60 tablet 0     Sig: Take 1 tablet by mouth 2 (Two) Times a Day.        Pharmacy where request should be sent: [facility] [address] [place] [address] OBEY KWASI GENIA [facility] [phone]  - [phone]     Additional details provided by patient:       Does the patient have less than a 3 day supply:  [x] Yes  [] No    Ravinder Pope Rep   04/06/22 14:21 EDT

## 2022-04-09 RX ORDER — DICLOFENAC SODIUM 75 MG/1
75 TABLET, DELAYED RELEASE ORAL 2 TIMES DAILY
Qty: 60 TABLET | Refills: 0 | Status: SHIPPED | OUTPATIENT
Start: 2022-04-09 | End: 2022-06-13 | Stop reason: SDUPTHER

## 2022-06-13 DIAGNOSIS — M19.90 ARTHRITIS: ICD-10-CM

## 2022-06-13 NOTE — TELEPHONE ENCOUNTER
Caller: Lissette Saavedra    Relationship: Self    Best call back number:[phone]    Requested Prescriptions:   Requested Prescriptions     Pending Prescriptions Disp Refills   • diclofenac (VOLTAREN) 75 MG EC tablet 60 tablet 0     Sig: Take 1 tablet by mouth 2 (Two) Times a Day.        Pharmacy where request should be sent: [facility] [address] [place] [address] OBEY KWASI CONNORJAMES [facility] [phone]  - [phone] FX     Additional details provided by patient: PATIENT IS OUT OF MEDICATION    Does the patient have less than a 3 day supply:  [x] Yes  [] No    Ravinder Talley Rep   06/13/22 09:06 EDT

## 2022-06-14 RX ORDER — DICLOFENAC SODIUM 75 MG/1
75 TABLET, DELAYED RELEASE ORAL 2 TIMES DAILY
Qty: 60 TABLET | Refills: 0 | Status: SHIPPED | OUTPATIENT
Start: 2022-06-14 | End: 2022-08-10 | Stop reason: SINTOL

## 2022-07-07 ENCOUNTER — OFFICE VISIT (OUTPATIENT)
Dept: FAMILY MEDICINE CLINIC | Age: 57
End: 2022-07-07

## 2022-07-07 ENCOUNTER — LAB (OUTPATIENT)
Dept: LAB | Facility: HOSPITAL | Age: 57
End: 2022-07-07

## 2022-07-07 VITALS
DIASTOLIC BLOOD PRESSURE: 77 MMHG | HEART RATE: 107 BPM | TEMPERATURE: 98 F | HEIGHT: 67 IN | SYSTOLIC BLOOD PRESSURE: 137 MMHG | WEIGHT: 257 LBS | BODY MASS INDEX: 40.34 KG/M2

## 2022-07-07 DIAGNOSIS — R07.9 CHEST PAIN, UNSPECIFIED TYPE: ICD-10-CM

## 2022-07-07 DIAGNOSIS — R53.83 FATIGUE, UNSPECIFIED TYPE: ICD-10-CM

## 2022-07-07 DIAGNOSIS — R94.31 ABNORMAL EKG: ICD-10-CM

## 2022-07-07 DIAGNOSIS — R14.0 ABDOMINAL BLOATING: Primary | ICD-10-CM

## 2022-07-07 DIAGNOSIS — R14.0 ABDOMINAL BLOATING: ICD-10-CM

## 2022-07-07 DIAGNOSIS — R19.7 DIARRHEA, UNSPECIFIED TYPE: ICD-10-CM

## 2022-07-07 LAB
ALBUMIN SERPL-MCNC: 4 G/DL (ref 3.5–5.2)
ALBUMIN/GLOB SERPL: 1.3 G/DL
ALP SERPL-CCNC: 66 U/L (ref 39–117)
ALT SERPL W P-5'-P-CCNC: 26 U/L (ref 1–33)
AMYLASE SERPL-CCNC: 41 U/L (ref 28–100)
ANION GAP SERPL CALCULATED.3IONS-SCNC: 11.2 MMOL/L (ref 5–15)
AST SERPL-CCNC: 15 U/L (ref 1–32)
BASOPHILS # BLD AUTO: 0.03 10*3/MM3 (ref 0–0.2)
BASOPHILS NFR BLD AUTO: 0.2 % (ref 0–1.5)
BILIRUB SERPL-MCNC: 0.9 MG/DL (ref 0–1.2)
BUN SERPL-MCNC: 12 MG/DL (ref 6–20)
BUN/CREAT SERPL: 14.5 (ref 7–25)
CALCIUM SPEC-SCNC: 9.4 MG/DL (ref 8.6–10.5)
CHLORIDE SERPL-SCNC: 102 MMOL/L (ref 98–107)
CO2 SERPL-SCNC: 28.8 MMOL/L (ref 22–29)
CREAT SERPL-MCNC: 0.83 MG/DL (ref 0.57–1)
DEPRECATED RDW RBC AUTO: 48.3 FL (ref 37–54)
EGFRCR SERPLBLD CKD-EPI 2021: 81.8 ML/MIN/1.73
EOSINOPHIL # BLD AUTO: 0.19 10*3/MM3 (ref 0–0.4)
EOSINOPHIL NFR BLD AUTO: 1.5 % (ref 0.3–6.2)
ERYTHROCYTE [DISTWIDTH] IN BLOOD BY AUTOMATED COUNT: 14 % (ref 12.3–15.4)
GLOBULIN UR ELPH-MCNC: 3.2 GM/DL
GLUCOSE SERPL-MCNC: 95 MG/DL (ref 65–99)
HCT VFR BLD AUTO: 48.6 % (ref 34–46.6)
HGB BLD-MCNC: 15.6 G/DL (ref 12–15.9)
IMM GRANULOCYTES # BLD AUTO: 0.03 10*3/MM3 (ref 0–0.05)
IMM GRANULOCYTES NFR BLD AUTO: 0.2 % (ref 0–0.5)
LIPASE SERPL-CCNC: 20 U/L (ref 13–60)
LYMPHOCYTES # BLD AUTO: 1.99 10*3/MM3 (ref 0.7–3.1)
LYMPHOCYTES NFR BLD AUTO: 16 % (ref 19.6–45.3)
MCH RBC QN AUTO: 29.9 PG (ref 26.6–33)
MCHC RBC AUTO-ENTMCNC: 32.1 G/DL (ref 31.5–35.7)
MCV RBC AUTO: 93.3 FL (ref 79–97)
MONOCYTES # BLD AUTO: 0.74 10*3/MM3 (ref 0.1–0.9)
MONOCYTES NFR BLD AUTO: 6 % (ref 5–12)
NEUTROPHILS NFR BLD AUTO: 76.1 % (ref 42.7–76)
NEUTROPHILS NFR BLD AUTO: 9.44 10*3/MM3 (ref 1.7–7)
PLATELET # BLD AUTO: 301 10*3/MM3 (ref 140–450)
PMV BLD AUTO: 9.6 FL (ref 6–12)
POTASSIUM SERPL-SCNC: 4.2 MMOL/L (ref 3.5–5.2)
PROT SERPL-MCNC: 7.2 G/DL (ref 6–8.5)
RBC # BLD AUTO: 5.21 10*6/MM3 (ref 3.77–5.28)
SODIUM SERPL-SCNC: 142 MMOL/L (ref 136–145)
TSH SERPL DL<=0.05 MIU/L-ACNC: 1.66 UIU/ML (ref 0.27–4.2)
UREA BREATH TEST QL: NEGATIVE
WBC NRBC COR # BLD: 12.42 10*3/MM3 (ref 3.4–10.8)

## 2022-07-07 PROCEDURE — 83690 ASSAY OF LIPASE: CPT

## 2022-07-07 PROCEDURE — 80050 GENERAL HEALTH PANEL: CPT

## 2022-07-07 PROCEDURE — 93000 ELECTROCARDIOGRAM COMPLETE: CPT | Performed by: FAMILY MEDICINE

## 2022-07-07 PROCEDURE — 36415 COLL VENOUS BLD VENIPUNCTURE: CPT

## 2022-07-07 PROCEDURE — 82150 ASSAY OF AMYLASE: CPT

## 2022-07-07 PROCEDURE — 83013 H PYLORI (C-13) BREATH: CPT

## 2022-07-07 PROCEDURE — 99214 OFFICE O/P EST MOD 30 MIN: CPT | Performed by: FAMILY MEDICINE

## 2022-07-07 RX ORDER — CICLOPIROX 7.7 MG/G
GEL TOPICAL DAILY
COMMUNITY
Start: 2022-07-01 | End: 2022-10-18

## 2022-07-07 NOTE — PROGRESS NOTES
"Lissette Saavedra presents to [facility].    Chief Complaint:  'I've just been so weak'    Subjective       History of Present Illness:  Lissette Roth is in today for follow-up on how she feels.  She has been having some difficulty with her stomach for the last 3 weeks at least.  She says that she has this bloated feeling or full feeling in her stomach most of the time.  That is present whether she eats or not.  She has had increased fatigue.  She is also noted a feeling of lightheadedness.  She has diarrhea as well.  For example, she had 7-8 bowel movements yesterday.  The stool can be \"frothy\".  There is a yellow coloring to the stool as well.  She is status postcholecystectomy.  She did have Cologuard in October 2019.  She denies dysphagia.  She denies significant nausea when she eats.      Review of Systems:  Review of Systems   Constitutional: Negative for chills and fever.   Respiratory: Negative for cough and shortness of breath.    Cardiovascular: Positive for chest pain (Week before last - couple of days). Negative for palpitations.   Gastrointestinal: Positive for abdominal pain (like someone is wringing it). Negative for nausea and vomiting.   Neurological: Positive for light-headedness.        Objective   Medical History:  Past Medical History:   • Acute vaginitis   • Candidiasis of skin and nail   • Diarrhea   • Essential (primary) hypertension   • Family history of diabetes mellitus   • Hyperlipidemia   • Low back pain   • Menopausal and female climacteric states   • Other long term (current) drug therapy   • Other specified polyneuropathies   • Pain in left knee   • Varicose veins of left lower extremity with inflammation     Past Surgical History:   • D & C HYSTEROSCOPY ENDOMETRIAL ABLATION   • FOOT SURGERY   • FOREARM SURGERY    skin graft    • LAPAROSCOPIC CHOLECYSTECTOMY      Family History   Problem Relation Age of Onset   • Coronary artery disease Mother    • " "Osteoarthritis Mother    • Coronary artery disease Father    • Alzheimer's disease Father    • Diabetes type II Father      Social History     Tobacco Use   • Smoking status: Never Smoker   • Smokeless tobacco: Never Used   Substance Use Topics   • Alcohol use: Not Currently       Health Maintenance Due   Topic Date Due   • COVID-19 Vaccine (1) Never done   • TDAP/TD VACCINES (2 - Tdap) 05/07/2013   • ZOSTER VACCINE (1 of 2) Never done   • MAMMOGRAM  10/02/2017   • LIPID PANEL  07/16/2021   • ANNUAL PHYSICAL  11/26/2021        Immunization History   Administered Date(s) Administered   • Flu Vaccine Intradermal Quad 18-64YR 11/25/2020   • Td 05/07/2003       Allergies   Allergen Reactions   • Ace Inhibitors Cough   • Aspirin Unknown - Low Severity   • Salicylates Swelling        Medications:  Current Outpatient Medications on File Prior to Visit   Medication Sig   • albuterol sulfate  (90 Base) MCG/ACT inhaler Inhale 2 puffs by mouth Every 4 (Four) Hours As Needed for Wheezing.   • ciclopirox (LOPROX) 0.77 % gel Daily.   • diclofenac (VOLTAREN) 75 MG EC tablet Take 1 tablet by mouth 2 (Two) Times a Day.   • gabapentin (NEURONTIN) 300 MG capsule Take 300 mg by mouth 2 (Two) Times a Day.   • montelukast (SINGULAIR) 10 MG tablet Take 10 mg by mouth Every Night.   • [DISCONTINUED] gabapentin (NEURONTIN) 100 MG capsule Take 100 mg by mouth Daily.   • [DISCONTINUED] methylPREDNISolone (MEDROL) 4 MG dose pack Take as directed on package instructions.   • [DISCONTINUED] promethazine-dextromethorphan (PROMETHAZINE-DM) 6.25-15 MG/5ML syrup Take 5 mL by mouth 4 (Four) Times a Day As Needed for Cough.     No current facility-administered medications on file prior to visit.       Vital Signs:   /77 (BP Location: Left arm, Patient Position: Sitting)   Pulse 107   Temp 98 °F (36.7 °C) (Oral)   Ht 170.2 cm (67.01\")   Wt 117 kg (257 lb)   BMI 40.24 kg/m²       Physical Exam:  Physical Exam  Vitals reviewed. "   Constitutional:       General: She is not in acute distress.     Appearance: She is obese. She is not ill-appearing.   Eyes:      Pupils: Pupils are equal, round, and reactive to light.   Neck:      Comments: No thyromegaly  Cardiovascular:      Rate and Rhythm: Normal rate and regular rhythm.   Pulmonary:      Effort: Pulmonary effort is normal.      Breath sounds: Normal breath sounds.   Abdominal:      General: There is no distension.      Palpations: Abdomen is soft.      Tenderness: There is abdominal tenderness (mild diffuse).   Musculoskeletal:      Cervical back: Neck supple.   Lymphadenopathy:      Cervical: No cervical adenopathy.   Skin:     Findings: No lesion or rash.   Neurological:      Mental Status: She is alert.         Result Review      The following data was reviewed by Abimael Woodard MD on 07/07/2022.  Lab Results   Component Value Date    WBC 9.59 10/08/2021    HGB 16.1 (H) 10/08/2021    HCT 47.7 (H) 10/08/2021    MCV 87.5 10/08/2021     10/08/2021     Lab Results   Component Value Date    GLUCOSE 115 (H) 10/08/2021    BUN 11 10/08/2021    CREATININE 0.75 10/08/2021     10/08/2021    K 3.4 (L) 10/08/2021     10/08/2021    CO2 21.7 (L) 10/08/2021    CALCIUM 9.2 10/08/2021    PROTEINTOT 7.4 10/08/2021    ALBUMIN 3.80 10/08/2021     (H) 10/08/2021    AST 61 (H) 10/08/2021    ALKPHOS 91 10/08/2021    BILITOT 1.1 10/08/2021    EGFRIFNONA 80 10/08/2021    GLOB 3.6 10/08/2021    AGRATIO 1.1 10/08/2021    BCR 14.7 10/08/2021    ANIONGAP 14.3 10/08/2021      Lab Results   Component Value Date    CHLPL 221 (H) 11/25/2020    TRIG 114 11/25/2020    HDL 53 11/25/2020     (H) 11/25/2020     Lab Results   Component Value Date    TSH 1.100 11/25/2020     No results found for: HGBA1C      ECG 12 Lead    Date/Time: 7/7/2022 3:05 PM  Performed by: Abimael Woodard MD  Authorized by: Abimael Woodard MD   Comparison: compared with previous ECG from  10/8/2021  Comparison to previous ECG: Presumed LVH  Rhythm: sinus rhythm  Rate: normal  BPM: 97  Conduction: conduction normal  ST Segments: ST segments normal  T Waves: T waves normal  QRS axis: normal  Other findings: left ventricular hypertrophy    Clinical impression: abnormal EKG  Clinical impression comment: This is a borderline EKG when compared to previous.  LVH is suggested by computer reading.  Heart rate is faster compared to last tracing.                Assessment and Plan:   Today, we have reviewed her care.  She has been having issues with her stomach for several weeks now.  Something is not right.  We will move ahead with evaluation as noted below.  I think the episode of chest pain that she had is probably not worrisome, but we will do an EKG as a precaution.  We will see what the testing shows and then be back in touch with her.  I did give some thought to covering her with an H2 blocker or PPI, but I would like to make sure the H. pylori is negative prior to doing so.         Diagnoses and all orders for this visit:    1. Abdominal bloating (Primary)  -     CBC Auto Differential; Future  -     Comprehensive Metabolic Panel; Future  -     Amylase; Future  -     Lipase; Future  -     H. Pylori Breath Test - Breath, Lung; Future    2. Diarrhea, unspecified type  -     Clostridium Difficile Toxin, PCR - Stool, Per Rectum; Future  -     Enteric Bacterial Panel - Stool, Per Rectum; Future  -     Fecal Lactoferrin Qual. - Stool, Per Rectum; Future    3. Fatigue, unspecified type  -     TSH; Future    4. Chest pain, unspecified type  -     ECG 12 Lead          Follow Up   Return if symptoms worsen or fail to improve.  Patient was given instructions and counseling regarding her condition or for health maintenance advice. Please see specific information pulled into the AVS if appropriate.

## 2022-07-08 ENCOUNTER — LAB (OUTPATIENT)
Dept: LAB | Facility: HOSPITAL | Age: 57
End: 2022-07-08

## 2022-07-08 DIAGNOSIS — R19.7 DIARRHEA, UNSPECIFIED TYPE: ICD-10-CM

## 2022-07-08 LAB
027 TOXIN: NORMAL
C DIFF TOX GENS STL QL NAA+PROBE: NEGATIVE
LACTOFERRIN STL QL LA: NEGATIVE

## 2022-07-08 PROCEDURE — 87493 C DIFF AMPLIFIED PROBE: CPT

## 2022-07-08 PROCEDURE — 83630 LACTOFERRIN FECAL (QUAL): CPT

## 2022-07-08 PROCEDURE — 87505 NFCT AGENT DETECTION GI: CPT

## 2022-07-08 RX ORDER — FAMOTIDINE 20 MG/1
20 TABLET, FILM COATED ORAL 2 TIMES DAILY
Qty: 60 TABLET | Refills: 0 | Status: SHIPPED | OUTPATIENT
Start: 2022-07-08 | End: 2022-11-02 | Stop reason: ALTCHOICE

## 2022-07-10 LAB
C COLI+JEJ+UPSA DNA STL QL NAA+NON-PROBE: NOT DETECTED
EC STX1+STX2 GENES STL QL NAA+NON-PROBE: NOT DETECTED
S ENT+BONG DNA STL QL NAA+NON-PROBE: NOT DETECTED
SHIGELLA SP+EIEC IPAH ST NAA+NON-PROBE: NOT DETECTED

## 2022-07-11 ENCOUNTER — APPOINTMENT (OUTPATIENT)
Dept: GENERAL RADIOLOGY | Facility: HOSPITAL | Age: 57
End: 2022-07-11

## 2022-07-11 ENCOUNTER — HOSPITAL ENCOUNTER (EMERGENCY)
Facility: HOSPITAL | Age: 57
Discharge: HOME | End: 2022-07-11
Attending: STUDENT IN AN ORGANIZED HEALTH CARE EDUCATION/TRAINING PROGRAM | Admitting: STUDENT IN AN ORGANIZED HEALTH CARE EDUCATION/TRAINING PROGRAM

## 2022-07-11 VITALS
BODY MASS INDEX: 44.84 KG/M2 | WEIGHT: 253.09 LBS | TEMPERATURE: 98.4 F | DIASTOLIC BLOOD PRESSURE: 88 MMHG | HEIGHT: 63 IN | RESPIRATION RATE: 16 BRPM | SYSTOLIC BLOOD PRESSURE: 129 MMHG | OXYGEN SATURATION: 100 % | HEART RATE: 88 BPM

## 2022-07-11 DIAGNOSIS — R07.9 CHEST PAIN, UNSPECIFIED TYPE: Primary | ICD-10-CM

## 2022-07-11 LAB
ALBUMIN SERPL-MCNC: 4.1 G/DL (ref 3.5–5.2)
ALBUMIN/GLOB SERPL: 1.2 G/DL
ALP SERPL-CCNC: 71 U/L (ref 39–117)
ALT SERPL W P-5'-P-CCNC: 25 U/L (ref 1–33)
ANION GAP SERPL CALCULATED.3IONS-SCNC: 10.1 MMOL/L (ref 5–15)
AST SERPL-CCNC: 16 U/L (ref 1–32)
BASOPHILS # BLD AUTO: 0.11 10*3/MM3 (ref 0–0.2)
BASOPHILS NFR BLD AUTO: 0.8 % (ref 0–1.5)
BILIRUB SERPL-MCNC: 1.3 MG/DL (ref 0–1.2)
BUN SERPL-MCNC: 11 MG/DL (ref 6–20)
BUN/CREAT SERPL: 12.1 (ref 7–25)
CALCIUM SPEC-SCNC: 10.2 MG/DL (ref 8.6–10.5)
CHLORIDE SERPL-SCNC: 102 MMOL/L (ref 98–107)
CK MB SERPL-CCNC: 3.43 NG/ML
CK SERPL-CCNC: 73 U/L (ref 20–180)
CO2 SERPL-SCNC: 25.9 MMOL/L (ref 22–29)
CREAT SERPL-MCNC: 0.91 MG/DL (ref 0.57–1)
DEPRECATED RDW RBC AUTO: 46.6 FL (ref 37–54)
EGFRCR SERPLBLD CKD-EPI 2021: 73.3 ML/MIN/1.73
EOSINOPHIL # BLD AUTO: 0.24 10*3/MM3 (ref 0–0.4)
EOSINOPHIL NFR BLD AUTO: 1.8 % (ref 0.3–6.2)
ERYTHROCYTE [DISTWIDTH] IN BLOOD BY AUTOMATED COUNT: 13.9 % (ref 12.3–15.4)
GLOBULIN UR ELPH-MCNC: 3.5 GM/DL
GLUCOSE SERPL-MCNC: 114 MG/DL (ref 65–99)
HCT VFR BLD AUTO: 49.6 % (ref 34–46.6)
HGB BLD-MCNC: 16.6 G/DL (ref 12–15.9)
HOLD SPECIMEN: NORMAL
IMM GRANULOCYTES # BLD AUTO: 0.05 10*3/MM3 (ref 0–0.05)
IMM GRANULOCYTES NFR BLD AUTO: 0.4 % (ref 0–0.5)
LIPASE SERPL-CCNC: 19 U/L (ref 13–60)
LYMPHOCYTES # BLD AUTO: 2.23 10*3/MM3 (ref 0.7–3.1)
LYMPHOCYTES NFR BLD AUTO: 17.1 % (ref 19.6–45.3)
MAGNESIUM SERPL-MCNC: 1.9 MG/DL (ref 1.6–2.6)
MCH RBC QN AUTO: 30.7 PG (ref 26.6–33)
MCHC RBC AUTO-ENTMCNC: 33.5 G/DL (ref 31.5–35.7)
MCV RBC AUTO: 91.7 FL (ref 79–97)
MONOCYTES # BLD AUTO: 0.87 10*3/MM3 (ref 0.1–0.9)
MONOCYTES NFR BLD AUTO: 6.7 % (ref 5–12)
NEUTROPHILS NFR BLD AUTO: 73.2 % (ref 42.7–76)
NEUTROPHILS NFR BLD AUTO: 9.55 10*3/MM3 (ref 1.7–7)
NRBC BLD AUTO-RTO: 0 /100 WBC (ref 0–0.2)
NT-PROBNP SERPL-MCNC: 55.8 PG/ML (ref 0–900)
PLATELET # BLD AUTO: 340 10*3/MM3 (ref 140–450)
PMV BLD AUTO: 9.8 FL (ref 6–12)
POTASSIUM SERPL-SCNC: 3.8 MMOL/L (ref 3.5–5.2)
PROT SERPL-MCNC: 7.6 G/DL (ref 6–8.5)
RBC # BLD AUTO: 5.41 10*6/MM3 (ref 3.77–5.28)
SODIUM SERPL-SCNC: 138 MMOL/L (ref 136–145)
TROPONIN I SERPL-MCNC: 0 NG/ML (ref 0–0.6)
TROPONIN I SERPL-MCNC: 0 NG/ML (ref 0–0.6)
WBC NRBC COR # BLD: 13.05 10*3/MM3 (ref 3.4–10.8)
WHOLE BLOOD HOLD COAG: NORMAL
WHOLE BLOOD HOLD SPECIMEN: NORMAL

## 2022-07-11 PROCEDURE — 93005 ELECTROCARDIOGRAM TRACING: CPT | Performed by: STUDENT IN AN ORGANIZED HEALTH CARE EDUCATION/TRAINING PROGRAM

## 2022-07-11 PROCEDURE — 82553 CREATINE MB FRACTION: CPT

## 2022-07-11 PROCEDURE — 83880 ASSAY OF NATRIURETIC PEPTIDE: CPT

## 2022-07-11 PROCEDURE — 71045 X-RAY EXAM CHEST 1 VIEW: CPT

## 2022-07-11 PROCEDURE — 84484 ASSAY OF TROPONIN QUANT: CPT

## 2022-07-11 PROCEDURE — 93005 ELECTROCARDIOGRAM TRACING: CPT

## 2022-07-11 PROCEDURE — 36415 COLL VENOUS BLD VENIPUNCTURE: CPT

## 2022-07-11 PROCEDURE — 99284 EMERGENCY DEPT VISIT MOD MDM: CPT

## 2022-07-11 PROCEDURE — 85025 COMPLETE CBC W/AUTO DIFF WBC: CPT

## 2022-07-11 PROCEDURE — 83690 ASSAY OF LIPASE: CPT

## 2022-07-11 PROCEDURE — 83735 ASSAY OF MAGNESIUM: CPT

## 2022-07-11 PROCEDURE — 93010 ELECTROCARDIOGRAM REPORT: CPT | Performed by: INTERNAL MEDICINE

## 2022-07-11 PROCEDURE — 82550 ASSAY OF CK (CPK): CPT

## 2022-07-11 PROCEDURE — 80053 COMPREHEN METABOLIC PANEL: CPT

## 2022-07-11 PROCEDURE — 96374 THER/PROPH/DIAG INJ IV PUSH: CPT

## 2022-07-11 RX ORDER — ALUMINA, MAGNESIA, AND SIMETHICONE 2400; 2400; 240 MG/30ML; MG/30ML; MG/30ML
15 SUSPENSION ORAL EVERY 6 HOURS PRN
Status: DISCONTINUED | OUTPATIENT
Start: 2022-07-11 | End: 2022-07-11 | Stop reason: HOSPADM

## 2022-07-11 RX ORDER — FAMOTIDINE 10 MG/ML
20 INJECTION, SOLUTION INTRAVENOUS ONCE
Status: COMPLETED | OUTPATIENT
Start: 2022-07-11 | End: 2022-07-11

## 2022-07-11 RX ORDER — SODIUM CHLORIDE 0.9 % (FLUSH) 0.9 %
10 SYRINGE (ML) INJECTION AS NEEDED
Status: DISCONTINUED | OUTPATIENT
Start: 2022-07-11 | End: 2022-07-11 | Stop reason: HOSPADM

## 2022-07-11 RX ORDER — LIDOCAINE HYDROCHLORIDE 20 MG/ML
10 SOLUTION OROPHARYNGEAL ONCE
Status: COMPLETED | OUTPATIENT
Start: 2022-07-11 | End: 2022-07-11

## 2022-07-11 RX ADMIN — LIDOCAINE HYDROCHLORIDE 10 ML: 20 SOLUTION ORAL at 14:08

## 2022-07-11 RX ADMIN — FAMOTIDINE 20 MG: 10 INJECTION INTRAVENOUS at 14:08

## 2022-07-11 RX ADMIN — ALUMINUM HYDROXIDE, MAGNESIUM HYDROXIDE, AND DIMETHICONE 15 ML: 400; 400; 40 SUSPENSION ORAL at 14:08

## 2022-07-11 NOTE — ED PROVIDER NOTES
Time: 1:22 PM EDT  Arrived by: private car  Chief Complaint: CP  History provided by: pt  History is limited by: N/A     History of Present Illness:  Patient is a 57 y.o. female that presents to the emergency department with CP described as pressure for the past couple weeks. She notes that the pain worsened today so she decided to be evaluated. The CP is improved and not present when seen in the ED. Nothing improves or worsens the pain.     Pt had SOB today.    She was seen by PCP last week for abdominal pain and mentioned the CP during this visit. She had an EKG done in office that was normal aside from LVH and was not referred for cardiac stress test.     History provided by:  Patient    Similar Symptoms Previously: no  Recently seen: Recently seen by PCP    Patient Care Team  Primary Care Provider: Mattie Ontiveros APRN    Past Medical History:     Allergies   Allergen Reactions   • Ace Inhibitors Cough   • Aspirin Unknown - Low Severity   • Salicylates Swelling     Past Medical History:   Diagnosis Date   • Acute vaginitis    • Candidiasis of skin and nail    • Diarrhea    • Essential (primary) hypertension    • Family history of diabetes mellitus    • Hyperlipidemia    • Low back pain    • Menopausal and female climacteric states    • Other long term (current) drug therapy    • Other specified polyneuropathies    • Pain in left knee    • Varicose veins of left lower extremity with inflammation      Past Surgical History:   Procedure Laterality Date   • D & C HYSTEROSCOPY ENDOMETRIAL ABLATION     • FOOT SURGERY Bilateral    • FOREARM SURGERY Left     skin graft    • LAPAROSCOPIC CHOLECYSTECTOMY       Family History   Problem Relation Age of Onset   • Coronary artery disease Mother    • Osteoarthritis Mother    • Coronary artery disease Father    • Alzheimer's disease Father    • Diabetes type II Father        Home Medications:  Prior to Admission medications    Medication Sig Start Date End Date Taking?  "Authorizing Provider   albuterol sulfate  (90 Base) MCG/ACT inhaler Inhale 2 puffs by mouth Every 4 (Four) Hours As Needed for Wheezing. 12/28/21   Amrita Fournier APRN   ciclopirox (LOPROX) 0.77 % gel Daily. 7/1/22   Fozia Salgado MD   diclofenac (VOLTAREN) 75 MG EC tablet Take 1 tablet by mouth 2 (Two) Times a Day. 6/14/22   Shin Vernon MD   famotidine (PEPCID) 20 MG tablet Take 1 tablet by mouth 2 (Two) Times a Day. 7/8/22   Abimael Woodard MD   gabapentin (NEURONTIN) 300 MG capsule Take 300 mg by mouth 2 (Two) Times a Day. 7/6/21   Fozia Salgado MD   montelukast (SINGULAIR) 10 MG tablet Take 10 mg by mouth Every Night.    Fozia Salgado MD        Social History:   Social History     Tobacco Use   • Smoking status: Never Smoker   • Smokeless tobacco: Never Used   Vaping Use   • Vaping Use: Never used   Substance Use Topics   • Alcohol use: Not Currently   • Drug use: Not Currently     Recent travel: no     Review of Systems:  Review of Systems   Constitutional: Negative for chills, diaphoresis and fever.   HENT: Negative for ear discharge and nosebleeds.    Eyes: Negative for photophobia.   Respiratory: Positive for shortness of breath.    Cardiovascular: Positive for chest pain.   Gastrointestinal: Negative for diarrhea, nausea and vomiting.   Genitourinary: Negative for dysuria.   Musculoskeletal: Negative for back pain and neck pain.   Skin: Negative for rash.   Neurological: Negative for headaches.        Physical Exam:  /88   Pulse 88   Temp 98.4 °F (36.9 °C) (Oral)   Resp 16   Ht 160 cm (63\")   Wt 115 kg (253 lb 1.4 oz)   SpO2 100%   BMI 44.83 kg/m²     Physical Exam  Vitals and nursing note reviewed.   HENT:      Head: Normocephalic.   Eyes:      Extraocular Movements: Extraocular movements intact.   Cardiovascular:      Rate and Rhythm: Normal rate and regular rhythm.      Heart sounds: Normal heart sounds.   Pulmonary:      Effort: " Pulmonary effort is normal. No respiratory distress.      Breath sounds: Normal breath sounds.   Musculoskeletal:         General: Normal range of motion.   Skin:     Coloration: Skin is not pale.   Neurological:      General: No focal deficit present.      Mental Status: She is alert.   Psychiatric:         Mood and Affect: Mood normal.                Medications in the Emergency Department:  Medications   sodium chloride 0.9 % flush 10 mL (has no administration in time range)   aluminum-magnesium hydroxide-simethicone (MAALOX MAX) 400-400-40 MG/5ML suspension 15 mL (15 mL Oral Given 7/11/22 1408)   Lidocaine Viscous HCl (XYLOCAINE) 2 % solution 10 mL (10 mL Mouth/Throat Given 7/11/22 1408)   famotidine (PEPCID) injection 20 mg (20 mg Intravenous Given 7/11/22 1408)        Labs  Lab Results (last 24 hours)     Procedure Component Value Units Date/Time    POC Troponin I with Hold Tube [[id number]] Collected: 07/11/22 1157    Specimen: Blood Updated: 07/11/22 1456    Narrative:      The following orders were created for panel order POC Troponin I with Hold Tube.  Procedure                               Abnormality         Status                     ---------                               -----------         ------                     POC Troponin I[[id number]]                                                              HOLD Troponin-I Tube[[id number]]                             Final result                 Please view results for these tests on the individual orders.    CBC & Differential [[id number]]  (Abnormal) Collected: 07/11/22 1157    Specimen: Blood from Arm, Right Updated: 07/11/22 1214    Narrative:      The following orders were created for panel order CBC & Differential.  Procedure                               Abnormality         Status                     ---------                               -----------         ------                     CBC Auto Differential[[id number]]        Abnormal            Final  result                 Please view results for these tests on the individual orders.    Comprehensive Metabolic Panel [[id number]]  (Abnormal) Collected: 07/11/22 1157    Specimen: Blood from Arm, Right Updated: 07/11/22 1234     Glucose 114 mg/dL      BUN 11 mg/dL      Creatinine 0.91 mg/dL      Sodium 138 mmol/L      Potassium 3.8 mmol/L      Chloride 102 mmol/L      CO2 25.9 mmol/L      Calcium 10.2 mg/dL      Total Protein 7.6 g/dL      Albumin 4.10 g/dL      ALT (SGPT) 25 U/L      AST (SGOT) 16 U/L      Alkaline Phosphatase 71 U/L      Total Bilirubin 1.3 mg/dL      Globulin 3.5 gm/dL      A/G Ratio 1.2 g/dL      BUN/Creatinine Ratio 12.1     Anion Gap 10.1 mmol/L      eGFR 73.3 mL/min/1.73      Comment: National Kidney Foundation and American Society of Nephrology (ASN) Task Force recommended calculation based on the Chronic Kidney Disease Epidemiology Collaboration (CKD-EPI) equation refit without adjustment for race.       Narrative:      GFR Normal >60  Chronic Kidney Disease <60  Kidney Failure <15      Lipase [[id number]]  (Normal) Collected: 07/11/22 1157    Specimen: Blood from Arm, Right Updated: 07/11/22 1234     Lipase 19 U/L     BNP [[id number]]  (Normal) Collected: 07/11/22 1157    Specimen: Blood from Arm, Right Updated: 07/11/22 1237     proBNP 55.8 pg/mL     Narrative:      Among patients with dyspnea, NT-proBNP is highly sensitive for the detection of acute congestive heart failure. In addition NT-proBNP of <300 pg/ml effectively rules out acute congestive heart failure with 99% negative predictive value.    Results may be falsely decreased if patient taking Biotin.      Magnesium [[id number]]  (Normal) Collected: 07/11/22 1157    Specimen: Blood from Arm, Right Updated: 07/11/22 1233     Magnesium 1.9 mg/dL     CK Total & CKMB [[id number]]  (Normal) Collected: 07/11/22 1157    Specimen: Blood from Arm, Right Updated: 07/11/22 1237     CKMB 3.43 ng/mL      Creatine Kinase 73 U/L     Narrative:       CKMB results may be falsely decreased if patient taking Biotin.    CBC Auto Differential [[id number]]  (Abnormal) Collected: 07/11/22 1157    Specimen: Blood from Arm, Right Updated: 07/11/22 1214     WBC 13.05 10*3/mm3      RBC 5.41 10*6/mm3      Hemoglobin 16.6 g/dL      Hematocrit 49.6 %      MCV 91.7 fL      MCH 30.7 pg      MCHC 33.5 g/dL      RDW 13.9 %      RDW-SD 46.6 fl      MPV 9.8 fL      Platelets 340 10*3/mm3      Neutrophil % 73.2 %      Lymphocyte % 17.1 %      Monocyte % 6.7 %      Eosinophil % 1.8 %      Basophil % 0.8 %      Immature Grans % 0.4 %      Neutrophils, Absolute 9.55 10*3/mm3      Lymphocytes, Absolute 2.23 10*3/mm3      Monocytes, Absolute 0.87 10*3/mm3      Eosinophils, Absolute 0.24 10*3/mm3      Basophils, Absolute 0.11 10*3/mm3      Immature Grans, Absolute 0.05 10*3/mm3      nRBC 0.0 /100 WBC     POC Troponin I [[id number]]  (Normal) Collected: 07/11/22 1200    Specimen: Blood Updated: 07/11/22 1212     Troponin I 0.00 ng/mL      Comment: Serial Number: [id number]:  [id number]       POC Troponin I [[id number]]  (Normal) Collected: 07/11/22 1403    Specimen: Blood Updated: 07/11/22 1414     Troponin I 0.00 ng/mL      Comment: Serial Number: [id number]:  [id number]              Imaging:  XR Chest 1 View    Result Date: 7/11/2022  PROCEDURE: XR CHEST 1 VW  COMPARISON: [facility] , XR CHEST PA AND LATERAL, 4/04/2022, 13:47.  INDICATIONS: Chest Pain Triage Protocol/chest pain worsening for 2 weeks  FINDINGS:   The lungs are well-expanded. The heart and pulmonary vasculature are within normal limits. No pleural effusions are identified. There are no active appearing infiltrates.  IMPRESSION: No active disease.  ORTEGA CAMERON MD       Electronically Signed and Approved By: ORTEGA CAMERON MD on 7/11/2022 at 12:32               Procedures:  Procedures    Progress  ED Course as of 07/11/22 1606   Mon Jul 11, 2022   1521 EKG normal sinus rhythm.  QTc 420.  Heart rate 84.   QRS 89. [LQ]      ED Course User Index  [LQ] Khalida Amador MD                            Medical Decision Making:  MDM  Number of Diagnoses or Management Options  Chest pain, unspecified type  Diagnosis management comments: ddx: ACS, Costochondritis, pleurisy, PE, dissection, tamponade, anxiety      Vital stable.  Patient has no chest pain or shortness of breath at this time low suspicion for PE.  CMP shows normal creatinine, no significant electrolyte abnormality with normal sodium and potassium levels.  Serial troponin negative.  Patient's heart score is less than 4.  Patient has a slight leukocytosis.  Chest x-ray shows no infiltrate.  Discussed follow-up with primary care doctor and starting Pepcid.       Amount and/or Complexity of Data Reviewed  Clinical lab tests: reviewed  Tests in the radiology section of CPT®: reviewed  Discuss the patient with other providers: yes         Final diagnoses:   Chest pain, unspecified type        Disposition:  ED Disposition     ED Disposition   Discharge    Condition   Stable    Comment   --             Documentation assistance provided by Any Lopez acting as scribe for Khalida Amador MD. Information recorded by the scribe was done at my direction and has been verified and validated by me.        Any Lopez  07/11/22 [id number]       Khalida Amador MD  07/11/22 2420

## 2022-08-03 ENCOUNTER — HOSPITAL ENCOUNTER (OUTPATIENT)
Dept: CARDIOLOGY | Facility: HOSPITAL | Age: 57
Discharge: HOME | End: 2022-08-03
Admitting: FAMILY MEDICINE

## 2022-08-03 DIAGNOSIS — R07.9 CHEST PAIN, UNSPECIFIED TYPE: ICD-10-CM

## 2022-08-03 DIAGNOSIS — R94.31 ABNORMAL EKG: ICD-10-CM

## 2022-08-03 LAB
BH CV ECHO MEAS - AO ROOT DIAM: 2.6 CM
BH CV ECHO MEAS - EF(MOD-BP): 65 %
BH CV ECHO MEAS - IVSD: 1.2 CM
BH CV ECHO MEAS - LA DIMENSION: 3.9 CM
BH CV ECHO MEAS - LAT PEAK E' VEL: 7 CM/SEC
BH CV ECHO MEAS - LVIDD: 4.7 CM
BH CV ECHO MEAS - LVIDS: 3.3 CM
BH CV ECHO MEAS - LVPWD: 1.3 CM
BH CV ECHO MEAS - MED PEAK E' VEL: 11 CM/SEC
BH CV ECHO MEAS - MV A MAX VEL: 89 CM/SEC
BH CV ECHO MEAS - MV DEC TIME: 160 MSEC
BH CV ECHO MEAS - MV E MAX VEL: 91 CM/SEC
BH CV ECHO MEAS - MV E/A: 1
BH CV ECHO MEASUREMENTS AVERAGE E/E' RATIO: 10.11
IVRT: 77 MSEC
LEFT ATRIUM VOLUME INDEX: 27 ML/M2
MAXIMAL PREDICTED HEART RATE: 162 BPM
STRESS TARGET HR: 138 BPM

## 2022-08-03 PROCEDURE — 93306 TTE W/DOPPLER COMPLETE: CPT

## 2022-08-07 LAB
QT INTERVAL: 312 MS
QT INTERVAL: 354 MS

## 2022-08-10 ENCOUNTER — OFFICE VISIT (OUTPATIENT)
Dept: FAMILY MEDICINE CLINIC | Age: 57
End: 2022-08-10

## 2022-08-10 VITALS
HEIGHT: 63 IN | DIASTOLIC BLOOD PRESSURE: 78 MMHG | HEART RATE: 83 BPM | SYSTOLIC BLOOD PRESSURE: 132 MMHG | BODY MASS INDEX: 45.79 KG/M2 | TEMPERATURE: 97.6 F | WEIGHT: 258.4 LBS

## 2022-08-10 DIAGNOSIS — Z12.11 SCREENING FOR COLON CANCER: ICD-10-CM

## 2022-08-10 DIAGNOSIS — R53.83 FATIGUE, UNSPECIFIED TYPE: ICD-10-CM

## 2022-08-10 DIAGNOSIS — R14.0 ABDOMINAL BLOATING: ICD-10-CM

## 2022-08-10 DIAGNOSIS — D75.1 POLYCYTHEMIA: ICD-10-CM

## 2022-08-10 DIAGNOSIS — R19.7 DIARRHEA, UNSPECIFIED TYPE: Primary | ICD-10-CM

## 2022-08-10 PROCEDURE — 99214 OFFICE O/P EST MOD 30 MIN: CPT | Performed by: NURSE PRACTITIONER

## 2022-08-10 RX ORDER — LACTOBACILLUS ACIDOPH-L.BULGARICUS 1 MILLION CELL CHEWABLE TABLET 1MM CELL
1 TABLET,CHEWABLE ORAL
Qty: 90 TABLET | Refills: 0 | Status: SHIPPED | OUTPATIENT
Start: 2022-08-10 | End: 2022-10-18

## 2022-08-10 NOTE — PROGRESS NOTES
Assessment and Plan   Diagnoses and all orders for this visit:    1. Diarrhea, unspecified type (Primary)  Comments:  We will try to start her on some Lactinex refer to gastroenterology and general surgery to try to get her scopes done prior to her appointment with gastro  Orders:  -     Ambulatory Referral to General Surgery  -     Ambulatory Referral to Gastroenterology  -     lactobacillus acidophilus & bulgar (LACTINEX) chewable tablet; Chew 1 tablet 3 (Three) Times a Day With Meals.  Dispense: 90 tablet; Refill: 0    2. Abdominal bloating  -     Ambulatory Referral to General Surgery  -     Ambulatory Referral to Gastroenterology    3. Polycythemia  Comments:  We discussed possibility of sleep apnea for the cause as well as some associated symptoms including fatigue she declines sleep study at this time    4. Fatigue, unspecified type  Comments:  Suspect she may be having some symptoms of sleep apnea we will defer B12 injections unless B12 levels warrant treatment follow-up    5. Screening for colon cancer  -     Ambulatory Referral to General Surgery                  Follow Up   Return if symptoms worsen or fail to improve, for After specialty evaluation.    Chief Complaint  Lissette Saavedra presents to [facility] for Chest Pain (Follow up on echo)    Subjective          Lissette is here for follow up on chest pain and changes in stool.  She recently was evaluated for possible post covid complication and her cardiac work-up was found to be negative.  She continues to have epigastric and anterior chest wall pain.  She also reports that she has been having some changes with her stool including diarrhea and light-colored frothy stools.  She reports having COVID back in September or October of last year and this has been persistently a problem since that time.  She was recently taken off her chronic diclofenac of which she used for arthritis thinking this may be contributing to some  "of her stomach and abdominal pain.  She does acknowledge that this has improved off of the medication and she did try to get back on this medication and found that her abdominal pains did return upon resuming this medicine shows she does continue to stay off of this at this time.  She is also taking famotidine as prescribed and reports that she may have seen some slight improvement but not completely resolved her problems.  She is having a lot of fatigue, feeling rundown.  She is inquiring about a B12 injection.    It is of note that Don's labs do show a slight elevation in her H&H which has been persistent over the last few checks.  We did discuss the possibility of the cause of this to be from possibly sleep apnea however at this time she wishes to pursue her gastro complaints and we will touch back on this after some of her gastrointestinal issues have been resolved.  It is also noted that her white blood cell count was slightly elevated.        Review of Systems   Constitutional: Positive for fatigue.   Gastrointestinal: Positive for abdominal distention, diarrhea, nausea, GERD and indigestion. Negative for blood in stool.   Neurological: Positive for weakness.       Objective     Vitals:    08/10/22 1344   BP: 132/78   BP Location: Right arm   Patient Position: Sitting   Cuff Size: Large Adult   Pulse: 83   Temp: 97.6 °F (36.4 °C)   TempSrc: Oral   Weight: 117 kg (258 lb 6.4 oz)   Height: 160 cm (62.99\")     Body mass index is 45.79 kg/m².     Physical Exam  Constitutional:       General: She is not in acute distress.     Appearance: Normal appearance.   HENT:      Head: Normocephalic.   Cardiovascular:      Rate and Rhythm: Normal rate and regular rhythm.   Pulmonary:      Effort: Pulmonary effort is normal.      Breath sounds: Normal breath sounds.   Abdominal:      Tenderness: There is abdominal tenderness (lower abdomen ).   Musculoskeletal:         General: Normal range of motion.   Neurological:      " General: No focal deficit present.      Mental Status: She is alert and oriented to person, place, and time.   Psychiatric:         Mood and Affect: Mood normal.         Behavior: Behavior normal.         Result Review                        Allergies   Allergen Reactions   • Ace Inhibitors Cough   • Aspirin Unknown - Low Severity   • Salicylates Swelling      Past Medical History:   Diagnosis Date   • Acute vaginitis    • Candidiasis of skin and nail    • Diarrhea    • Essential (primary) hypertension    • Family history of diabetes mellitus    • Hyperlipidemia    • Low back pain    • Menopausal and female climacteric states    • Other long term (current) drug therapy    • Other specified polyneuropathies    • Pain in left knee    • Varicose veins of left lower extremity with inflammation      Current Outpatient Medications   Medication Sig Dispense Refill   • albuterol sulfate  (90 Base) MCG/ACT inhaler Inhale 2 puffs by mouth Every 4 (Four) Hours As Needed for Wheezing. 6.7 g 0   • ciclopirox (LOPROX) 0.77 % gel Daily.     • famotidine (PEPCID) 20 MG tablet Take 1 tablet by mouth 2 (Two) Times a Day. 60 tablet 0   • gabapentin (NEURONTIN) 300 MG capsule Take 300 mg by mouth 2 (Two) Times a Day.     • montelukast (SINGULAIR) 10 MG tablet Take 10 mg by mouth Every Night.     • lactobacillus acidophilus & bulgar (LACTINEX) chewable tablet Chew 1 tablet 3 (Three) Times a Day With Meals. 90 tablet 0     No current facility-administered medications for this visit.     Past Surgical History:   Procedure Laterality Date   • D & C HYSTEROSCOPY ENDOMETRIAL ABLATION     • FOOT SURGERY Bilateral    • FOREARM SURGERY Left     skin graft    • LAPAROSCOPIC CHOLECYSTECTOMY        Health Maintenance Due   Topic Date Due   • COVID-19 Vaccine (1) Never done   • TDAP/TD VACCINES (2 - Tdap) 05/07/2013   • ZOSTER VACCINE (1 of 2) Never done   • MAMMOGRAM  10/02/2017   • LIPID PANEL  07/16/2021   • ANNUAL PHYSICAL  11/26/2021       Immunization History   Administered Date(s) Administered   • Flu Vaccine Intradermal Quad 18-64YR 11/25/2020   • Td 05/07/2003

## 2022-08-17 ENCOUNTER — PREP FOR SURGERY (OUTPATIENT)
Dept: OTHER | Facility: HOSPITAL | Age: 57
End: 2022-08-17

## 2022-08-17 ENCOUNTER — OFFICE VISIT (OUTPATIENT)
Dept: SURGERY | Facility: CLINIC | Age: 57
End: 2022-08-17

## 2022-08-17 VITALS — HEIGHT: 62 IN | WEIGHT: 256 LBS | BODY MASS INDEX: 47.11 KG/M2 | HEART RATE: 78 BPM

## 2022-08-17 DIAGNOSIS — R10.13 EPIGASTRIC PAIN: ICD-10-CM

## 2022-08-17 DIAGNOSIS — R19.7 DIARRHEA: ICD-10-CM

## 2022-08-17 DIAGNOSIS — K21.9 GASTROESOPHAGEAL REFLUX DISEASE WITHOUT ESOPHAGITIS: Primary | ICD-10-CM

## 2022-08-17 DIAGNOSIS — R19.7 DIARRHEA, UNSPECIFIED TYPE: ICD-10-CM

## 2022-08-17 DIAGNOSIS — R10.84 GENERALIZED ABDOMINAL PAIN: ICD-10-CM

## 2022-08-17 DIAGNOSIS — K21.9 GERD WITHOUT ESOPHAGITIS: Primary | ICD-10-CM

## 2022-08-17 DIAGNOSIS — R10.9 ABDOMINAL PAIN: ICD-10-CM

## 2022-08-17 PROCEDURE — 99213 OFFICE O/P EST LOW 20 MIN: CPT | Performed by: NURSE PRACTITIONER

## 2022-08-17 RX ORDER — POLYETHYLENE GLYCOL 3350 17 G/17G
POWDER, FOR SOLUTION ORAL
Qty: 238 PACKET | Refills: 0 | Status: ON HOLD | OUTPATIENT
Start: 2022-08-17 | End: 2022-10-19

## 2022-08-17 RX ORDER — CHOLESTYRAMINE 4 G/9G
1 POWDER, FOR SUSPENSION ORAL
Qty: 90 PACKET | Refills: 0 | Status: SHIPPED | OUTPATIENT
Start: 2022-08-17 | End: 2022-10-18

## 2022-08-17 NOTE — PROGRESS NOTES
Chief Complaint: Colonoscopy (consult)    Subjective      EGD and colonoscopy consultation       History of Present Illness  Lissette Saavedra is a 57 y.o. female presents to [facility] for EGD and colonoscopy consultation.    Patient presents today on referral for Mattie Ballard for an EGD and colonoscopy consultation.    Patient reports that she is with heartburn and indigestion.  She was recently seen in the ER and started on Tagamet, she reports her symptoms have improved.  Admits epigastric pain.  Denies any dysphagia.    Patient reports that she is with diarrhea, and has been since having laparoscopic cholecystectomy.  She admits to right-sided abdominal pain.  Denies any rectal bleeding.      No previous colonoscopy.  Objective     Past Medical History:   Diagnosis Date   • Acute vaginitis    • Candidiasis of skin and nail    • Diarrhea    • Essential (primary) hypertension    • Family history of diabetes mellitus    • Hyperlipidemia    • Low back pain    • Menopausal and female climacteric states    • Other long term (current) drug therapy    • Other specified polyneuropathies    • Pain in left knee    • Varicose veins of left lower extremity with inflammation        Past Surgical History:   Procedure Laterality Date   • D & C HYSTEROSCOPY ENDOMETRIAL ABLATION     • FOOT SURGERY Bilateral    • FOREARM SURGERY Left     skin graft    • LAPAROSCOPIC CHOLECYSTECTOMY         Outpatient Medications Marked as Taking for the 8/17/22 encounter (Office Visit) with Mercedes Segura APRN   Medication Sig Dispense Refill   • albuterol sulfate  (90 Base) MCG/ACT inhaler Inhale 2 puffs by mouth Every 4 (Four) Hours As Needed for Wheezing. 6.7 g 0   • ciclopirox (LOPROX) 0.77 % gel Daily.     • famotidine (PEPCID) 20 MG tablet Take 1 tablet by mouth 2 (Two) Times a Day. 60 tablet 0   • gabapentin (NEURONTIN) 300 MG capsule Take 300 mg by mouth 2 (Two) Times a Day.     • montelukast  "(SINGULAIR) 10 MG tablet Take 10 mg by mouth Every Night.         Allergies   Allergen Reactions   • Ace Inhibitors Cough   • Aspirin Unknown - Low Severity   • Salicylates Swelling        Family History   Problem Relation Age of Onset   • Coronary artery disease Mother    • Osteoarthritis Mother    • Coronary artery disease Father    • Alzheimer's disease Father    • Diabetes type II Father        Social History     Socioeconomic History   • Marital status:    • Number of children: 3   Tobacco Use   • Smoking status: Never Smoker   • Smokeless tobacco: Never Used   Vaping Use   • Vaping Use: Never used   Substance and Sexual Activity   • Alcohol use: Not Currently   • Drug use: Not Currently   • Sexual activity: Defer       Review of Systems   Constitutional: Negative for chills and fever.   Gastrointestinal: Positive for abdominal pain, diarrhea, GERD and indigestion. Negative for abdominal distention, anal bleeding, blood in stool, constipation, nausea, rectal pain and vomiting.        Vital Signs:   Pulse 78   Ht 157.5 cm (62\")   Wt 116 kg (256 lb)   BMI 46.82 kg/m²      Physical Exam  Vitals and nursing note reviewed.   Constitutional:       General: She is not in acute distress.     Appearance: She is obese.   HENT:      Head: Normocephalic.   Cardiovascular:      Rate and Rhythm: Normal rate.   Pulmonary:      Effort: Pulmonary effort is normal.      Breath sounds: No stridor. No wheezing.   Abdominal:      General: Abdomen is flat.      Palpations: Abdomen is soft.      Tenderness: There is no guarding.   Musculoskeletal:         General: No deformity. Normal range of motion.   Skin:     General: Skin is warm and dry.      Coloration: Skin is not jaundiced.   Neurological:      General: No focal deficit present.      Mental Status: She is alert and oriented to person, place, and time.      Motor: No weakness.   Psychiatric:         Mood and Affect: Mood normal.         Thought Content: Thought " content normal.          Result Review :          []  Laboratory  []  Radiology  []  Pathology  []  Microbiology  []  EKG/Telemetry   []  Cardiology/Vascular   [x]  Old records  Today reviewed Mattie Ballard's previous office note.     Assessment and Plan    Diagnoses and all orders for this visit:    1. Gastroesophageal reflux disease without esophagitis (Primary)    2. Epigastric pain    3. Diarrhea, unspecified type    4. Generalized abdominal pain    Other orders  -     polyethylene glycol (MIRALAX) 17 g packet; Take as directed.  Instructions given in office.  Dispense: 238 g bottle  Dispense: 238 packet; Refill: 0  -     cholestyramine (Questran) 4 g packet; Take 1 packet by mouth 3 (Three) Times a Day With Meals for 30 days.  Dispense: 90 packet; Refill: 0    white prep    Follow Up   Return for Schedule EGD and colonoscopy on 10/19/2022 with Dr. Garcia at [facility].     Hospital arrival time: 0830.    Possible risks/complications, benefits, and alternatives to surgical or invasive procedure have been explained to patient and/or legal guardian.     Patient has been evaluated and can tolerate anesthesia and/or sedation. Risks, benefits, and alternatives to anesthesia and sedation have been explained to patient and/or legal guardian.  Patient verbalizes understanding and is will proceed with above plan.    Patient was given instructions and counseling regarding her condition or for health maintenance advice. Please see specific information pulled into the AVS if appropriate.

## 2022-10-19 ENCOUNTER — HOSPITAL ENCOUNTER (OUTPATIENT)
Facility: HOSPITAL | Age: 57
Setting detail: HOSPITAL OUTPATIENT SURGERY
Discharge: HOME | End: 2022-10-19
Attending: SURGERY | Admitting: SURGERY

## 2022-10-19 ENCOUNTER — ANESTHESIA (OUTPATIENT)
Dept: GASTROENTEROLOGY | Facility: HOSPITAL | Age: 57
End: 2022-10-19

## 2022-10-19 ENCOUNTER — ANESTHESIA EVENT (OUTPATIENT)
Dept: GASTROENTEROLOGY | Facility: HOSPITAL | Age: 57
End: 2022-10-19

## 2022-10-19 VITALS
BODY MASS INDEX: 39.24 KG/M2 | WEIGHT: 250 LBS | RESPIRATION RATE: 17 BRPM | DIASTOLIC BLOOD PRESSURE: 85 MMHG | TEMPERATURE: 97.6 F | OXYGEN SATURATION: 97 % | HEART RATE: 85 BPM | SYSTOLIC BLOOD PRESSURE: 141 MMHG | HEIGHT: 67 IN

## 2022-10-19 DIAGNOSIS — R10.13 EPIGASTRIC PAIN: ICD-10-CM

## 2022-10-19 DIAGNOSIS — R19.7 DIARRHEA: ICD-10-CM

## 2022-10-19 DIAGNOSIS — K21.9 GERD WITHOUT ESOPHAGITIS: ICD-10-CM

## 2022-10-19 DIAGNOSIS — R10.9 ABDOMINAL PAIN: ICD-10-CM

## 2022-10-19 PROCEDURE — 25010000002 PROPOFOL 10 MG/ML EMULSION: Performed by: NURSE ANESTHETIST, CERTIFIED REGISTERED

## 2022-10-19 PROCEDURE — 88305 TISSUE EXAM BY PATHOLOGIST: CPT | Performed by: SURGERY

## 2022-10-19 RX ORDER — SODIUM CHLORIDE, SODIUM LACTATE, POTASSIUM CHLORIDE, CALCIUM CHLORIDE 600; 310; 30; 20 MG/100ML; MG/100ML; MG/100ML; MG/100ML
30 INJECTION, SOLUTION INTRAVENOUS CONTINUOUS
Status: DISCONTINUED | OUTPATIENT
Start: 2022-10-19 | End: 2022-10-19 | Stop reason: HOSPADM

## 2022-10-19 RX ORDER — LACTOBACILLUS RHAMNOSUS GG 10B CELL
1 CAPSULE ORAL DAILY
COMMUNITY

## 2022-10-19 RX ORDER — LIDOCAINE HYDROCHLORIDE 20 MG/ML
INJECTION, SOLUTION EPIDURAL; INFILTRATION; INTRACAUDAL; PERINEURAL AS NEEDED
Status: DISCONTINUED | OUTPATIENT
Start: 2022-10-19 | End: 2022-10-19 | Stop reason: SURG

## 2022-10-19 RX ORDER — PROPOFOL 10 MG/ML
VIAL (ML) INTRAVENOUS AS NEEDED
Status: DISCONTINUED | OUTPATIENT
Start: 2022-10-19 | End: 2022-10-19 | Stop reason: SURG

## 2022-10-19 RX ADMIN — LIDOCAINE HYDROCHLORIDE 40 MG: 20 INJECTION, SOLUTION EPIDURAL; INFILTRATION; INTRACAUDAL; PERINEURAL at 10:11

## 2022-10-19 RX ADMIN — SODIUM CHLORIDE, POTASSIUM CHLORIDE, SODIUM LACTATE AND CALCIUM CHLORIDE 30 ML/HR: 600; 310; 30; 20 INJECTION, SOLUTION INTRAVENOUS at 09:49

## 2022-10-19 RX ADMIN — PROPOFOL 50 MG: 10 INJECTION, EMULSION INTRAVENOUS at 10:11

## 2022-10-19 RX ADMIN — PROPOFOL 250 MCG/KG/MIN: 10 INJECTION, EMULSION INTRAVENOUS at 10:11

## 2022-10-19 NOTE — ANESTHESIA POSTPROCEDURE EVALUATION
Patient: Lissette Saavedra    Procedure Summary     Date: 10/19/22 Room / Location: [facility] ENDOSCOPY 1 / [facility] END[facility]COPY    Anesthesia Start: 1010 Anesthesia Stop: 1043    Procedures:       ESOPHAGOGASTRODUODENOSCOPY WITH BX      COLONOSCOPY Diagnosis:       GERD without esophagitis      Epigastric pain      Diarrhea      Abdominal pain      (GERD without esophagitis [K21.9])      (Epigastric pain [R10.13])      (Diarrhea [R19.7])      (Abdominal pain [R10.9])    Surgeons: Shukri Garcia MD Provider: Mikhail Hsu MD    Anesthesia Type: general ASA Status: 3          Anesthesia Type: general    Vitals  Vitals Value Taken Time   /85 10/19/22 1050   Temp 36.4 °C (97.6 °F) 10/19/22 1040   Pulse 85 10/19/22 1050   Resp 17 10/19/22 1050   SpO2 97 % 10/19/22 1050           Post Anesthesia Care and Evaluation    Patient location during evaluation: bedside  Patient participation: complete - patient participated  Level of consciousness: awake  Pain management: adequate    Airway patency: patent  Anesthetic complications: No anesthetic complications  PONV Status: none  Cardiovascular status: acceptable and stable  Respiratory status: acceptable  Hydration status: acceptable    Comments: An Anesthesiologist personally participated in the most demanding procedures (including induction and emergence if applicable) in the anesthesia plan, monitored the course of anesthesia administration at frequent intervals and remained physically present and available for immediate diagnosis and treatment of emergencies.

## 2022-10-19 NOTE — H&P
Chief Complaint: Colonoscopy (consult)    Subjective      EGD and colonoscopy consultation       History of Present Illness  Lissette Saavedra is a 57 y.o. female presents to [facility] for EGD and colonoscopy consultation.    Patient presents today on referral for Mattie Ballard for an EGD and colonoscopy consultation.    Patient reports that she is with heartburn and indigestion.  She was recently seen in the ER and started on Tagamet, she reports her symptoms have improved.  Admits epigastric pain.  Denies any dysphagia.    Patient reports that she is with diarrhea, and has been since having laparoscopic cholecystectomy.  She admits to right-sided abdominal pain.  Denies any rectal bleeding.      No previous colonoscopy.  Objective     Past Medical History:   Diagnosis Date   • Acute vaginitis    • Candidiasis of skin and nail    • Diarrhea    • Essential (primary) hypertension    • Family history of diabetes mellitus    • Hyperlipidemia    • Low back pain    • Menopausal and female climacteric states    • Other long term (current) drug therapy    • Other specified polyneuropathies    • Pain in left knee    • Varicose veins of left lower extremity with inflammation        Past Surgical History:   Procedure Laterality Date   • D & C HYSTEROSCOPY ENDOMETRIAL ABLATION     • FOOT SURGERY Bilateral    • FOREARM SURGERY Left     skin graft    • LAPAROSCOPIC CHOLECYSTECTOMY         Outpatient Medications Marked as Taking for the 8/17/22 encounter (Office Visit) with Mercedes Segura APRN   Medication Sig Dispense Refill   • albuterol sulfate  (90 Base) MCG/ACT inhaler Inhale 2 puffs by mouth Every 4 (Four) Hours As Needed for Wheezing. 6.7 g 0   • ciclopirox (LOPROX) 0.77 % gel Daily.     • famotidine (PEPCID) 20 MG tablet Take 1 tablet by mouth 2 (Two) Times a Day. 60 tablet 0   • gabapentin (NEURONTIN) 300 MG capsule Take 300 mg by mouth 2 (Two) Times a Day.     • montelukast  "(SINGULAIR) 10 MG tablet Take 10 mg by mouth Every Night.         Allergies   Allergen Reactions   • Ace Inhibitors Cough   • Aspirin Unknown - Low Severity   • Salicylates Swelling        Family History   Problem Relation Age of Onset   • Coronary artery disease Mother    • Osteoarthritis Mother    • Coronary artery disease Father    • Alzheimer's disease Father    • Diabetes type II Father        Social History     Socioeconomic History   • Marital status:    • Number of children: 3   Tobacco Use   • Smoking status: Never Smoker   • Smokeless tobacco: Never Used   Vaping Use   • Vaping Use: Never used   Substance and Sexual Activity   • Alcohol use: Not Currently   • Drug use: Not Currently   • Sexual activity: Defer     Vital Signs:   Pulse 78   Ht 157.5 cm (62\")   Wt 116 kg (256 lb)   BMI 46.82 kg/m²      Physical Exam  Vitals and nursing note reviewed.   Constitutional:       General: She is not in acute distress.  HENT:      Head: Normocephalic.   Cardiovascular:      Rate and Rhythm: Normal rate.   Pulmonary:      Effort: Pulmonary effort is normal.      Breath sounds: No stridor. No wheezing.   Abdominal:      General: Abdomen is flat.      Palpations: Abdomen is soft.      Tenderness: There is no guarding.   Musculoskeletal:         General: No deformity. Normal range of motion.   Skin:     General: Skin is warm and dry.      Coloration: Skin is not jaundiced.   Neurological:      General: No focal deficit present.      Mental Status: She is alert and oriented to person, place, and time.      Motor: No weakness.   Psychiatric:         Mood and Affect: Mood normal.         Thought Content: Thought content normal.       Assessment  1. Gastroesophageal reflux disease  2. Epigastric pain  3. Diarrhea, unspecified type  4. Generalized abdominal pain    Plan  Colonoscopy  Esophagogastroduodenoscopy    Risks and benefits discussed    Shukri Garcia M.D.  10/19/22    Electronically signed by Shkuri TREADWELL " MD Jose, 10/19/22, 7:21 AM EDT.

## 2022-10-19 NOTE — ANESTHESIA PREPROCEDURE EVALUATION
Anesthesia Evaluation     Patient summary reviewed and Nursing notes reviewed                Airway   Mallampati: I  TM distance: >3 FB  Neck ROM: full  No difficulty expected  Dental    (+) upper dentures and poor dentition    Pulmonary - negative pulmonary ROS and normal exam    breath sounds clear to auscultation  Cardiovascular - normal exam    Rhythm: regular  Rate: normal    (+) hypertension, hyperlipidemia,       Neuro/Psych  (+) numbness,    GI/Hepatic/Renal/Endo    (+) morbid obesity, GERD,      Musculoskeletal     Abdominal    Substance History - negative use     OB/GYN negative ob/gyn ROS         Other   arthritis,                      Anesthesia Plan    ASA 3     general     intravenous induction     Anesthetic plan, risks, benefits, and alternatives have been provided, discussed and informed consent has been obtained with: patient.        CODE STATUS:

## 2022-10-20 LAB
CYTO UR: NORMAL
LAB AP CASE REPORT: NORMAL
LAB AP CLINICAL INFORMATION: NORMAL
PATH REPORT.FINAL DX SPEC: NORMAL
PATH REPORT.GROSS SPEC: NORMAL

## 2022-11-02 ENCOUNTER — OFFICE VISIT (OUTPATIENT)
Dept: FAMILY MEDICINE CLINIC | Age: 57
End: 2022-11-02

## 2022-11-02 VITALS
TEMPERATURE: 97.5 F | HEART RATE: 98 BPM | BODY MASS INDEX: 39.18 KG/M2 | OXYGEN SATURATION: 99 % | HEIGHT: 67 IN | WEIGHT: 249.6 LBS | DIASTOLIC BLOOD PRESSURE: 106 MMHG | SYSTOLIC BLOOD PRESSURE: 149 MMHG

## 2022-11-02 DIAGNOSIS — I10 PRIMARY HYPERTENSION: ICD-10-CM

## 2022-11-02 DIAGNOSIS — K20.90 ESOPHAGITIS: ICD-10-CM

## 2022-11-02 DIAGNOSIS — J40 BRONCHITIS: Primary | ICD-10-CM

## 2022-11-02 PROCEDURE — 87428 SARSCOV & INF VIR A&B AG IA: CPT | Performed by: NURSE PRACTITIONER

## 2022-11-02 PROCEDURE — 99213 OFFICE O/P EST LOW 20 MIN: CPT | Performed by: NURSE PRACTITIONER

## 2022-11-02 RX ORDER — PANTOPRAZOLE SODIUM 20 MG/1
20 TABLET, DELAYED RELEASE ORAL DAILY
Qty: 30 TABLET | Refills: 1 | Status: SHIPPED | OUTPATIENT
Start: 2022-11-02 | End: 2023-03-07 | Stop reason: ALTCHOICE

## 2022-11-02 RX ORDER — BENZONATATE 200 MG/1
200 CAPSULE ORAL 3 TIMES DAILY PRN
Qty: 30 CAPSULE | Refills: 0 | Status: SHIPPED | OUTPATIENT
Start: 2022-11-02 | End: 2023-03-07

## 2022-11-02 RX ORDER — POLYETHYLENE GLYCOL 3350 17 G/17G
17 POWDER, FOR SOLUTION ORAL AS NEEDED
COMMUNITY
Start: 2022-08-17 | End: 2023-03-07

## 2022-11-02 NOTE — PROGRESS NOTES
Assessment and Plan   Diagnoses and all orders for this visit:    1. Bronchitis (Primary)  Comments:  RTO if new or worsening symptoms , mucinex. suspect viral   Orders:  -     POCT SARS-CoV-2 Antigen MAUREEN + Flu  -     benzonatate (TESSALON) 200 MG capsule; Take 1 capsule by mouth 3 (Three) Times a Day As Needed for Cough.  Dispense: 30 capsule; Refill: 0    2. Esophagitis  -     pantoprazole (Protonix) 20 MG EC tablet; Take 1 tablet by mouth Daily.  Dispense: 30 tablet; Refill: 1                  Follow Up   Return if symptoms worsen or fail to improve.    Chief Complaint  Lissette Saavedra presents to [facility] for Follow-up (Sx: congestion,headache, & coughing)    Subjective          History of Present Illness  Patient here today for concerns of possible covid infection or URI     Known Exposure to positive case?   n/a  Date of exposure?   none  Date of symptoms start?   2-3 days ago feeling worse  But cough going on for some time  (Symptoms may appear 2-14 days after exposure )    Fever or chills?   no  Cough?   yes  Shortness of breath or difficulty breathing?  no  Fatigue?   no  Muscle or body aches?  no   Headache?   yes  New loss of taste or smell? no  Sore throat?  yes  Congestion or runny nose? yes  Nausea or vomiting?   yes  Diarrhea?   no  Any  emergency warning signs for COVID-19.   Trouble breathing?  no  Persistent pain or pressure in the chest?   no  New confusion? no  Inability to wake or stay awake?no  Pale, gray, or blue-colored skin, lips, or nail beds, depending on skin tone?   no    Taking any medications at home to help with symptoms?sudafed / OTC  Any prior vaccine to covid?   no  Any significant health problems / existing lung / heart problems?  yes        Lissette presents today for follow up on Hypertension.    Current medication / treatment includes none.    Reported as BP checks at home: well controlled when not sick and taking medication.            Review  "of Systems    Objective     Vitals:    11/02/22 1529   BP: (!) 149/106   BP Location: Left arm   Patient Position: Sitting   Cuff Size: Adult   Pulse: 98   Temp: 97.5 °F (36.4 °C)   TempSrc: Oral   SpO2: 99%   Weight: 113 kg (249 lb 9.6 oz)   Height: 168.9 cm (66.5\")     Body mass index is 39.68 kg/m².     Physical Exam  Vitals and nursing note reviewed.   Constitutional:       Appearance: She is ill-appearing.   HENT:      Right Ear: Tympanic membrane and ear canal normal. Drainage and tenderness present. No middle ear effusion. There is no impacted cerumen. Tympanic membrane is not scarred or erythematous.      Left Ear: Tympanic membrane and ear canal normal. Drainage and tenderness present.  No middle ear effusion. There is no impacted cerumen. Tympanic membrane is not scarred or erythematous.      Nose:      Right Sinus: No maxillary sinus tenderness or frontal sinus tenderness.      Left Sinus: No maxillary sinus tenderness or frontal sinus tenderness.      Mouth/Throat:      Pharynx: No pharyngeal swelling, oropharyngeal exudate, posterior oropharyngeal erythema or uvula swelling.      Tonsils: No tonsillar exudate. 0 on the right. 0 on the left.   Cardiovascular:      Rate and Rhythm: Normal rate and regular rhythm.   Pulmonary:      Effort: Pulmonary effort is normal.      Breath sounds: Normal breath sounds. No wheezing or rhonchi.   Lymphadenopathy:      Head:      Right side of head: No submandibular or preauricular adenopathy.      Left side of head: No submandibular or preauricular adenopathy.   Skin:     General: Skin is warm and dry.   Neurological:      Mental Status: She is alert and oriented to person, place, and time.   Psychiatric:         Attention and Perception: Attention and perception normal.         Behavior: Behavior normal.         Result Review                        Allergies   Allergen Reactions   • Ace Inhibitors Cough   • Aspirin Unknown - Low Severity   • Salicylates Swelling    "   Past Medical History:   Diagnosis Date   • Acute vaginitis    • Candidiasis of skin and nail    • Diarrhea    • Essential (primary) hypertension    • Family history of diabetes mellitus    • Hyperlipidemia    • Low back pain    • Menopausal and female climacteric states    • Neuropathy    • Other long term (current) drug therapy    • Other specified polyneuropathies    • Pain in left knee    • Varicose veins of left lower extremity with inflammation      Current Outpatient Medications   Medication Sig Dispense Refill   • EQ ClearLax 17 GM/SCOOP powder Take 17 g by mouth As Needed.     • montelukast (SINGULAIR) 10 MG tablet Take 10 mg by mouth Every Night.     • albuterol sulfate  (90 Base) MCG/ACT inhaler Inhale 2 puffs by mouth Every 4 (Four) Hours As Needed for Wheezing. 6.7 g 0   • benzonatate (TESSALON) 200 MG capsule Take 1 capsule by mouth 3 (Three) Times a Day As Needed for Cough. 30 capsule 0   • gabapentin (NEURONTIN) 300 MG capsule Take 300 mg by mouth 2 (Two) Times a Day.     • pantoprazole (Protonix) 20 MG EC tablet Take 1 tablet by mouth Daily. 30 tablet 1   • probiotic (CULTURELLE) capsule capsule Take 1 capsule by mouth Daily.       No current facility-administered medications for this visit.     Past Surgical History:   Procedure Laterality Date   • COLONOSCOPY N/A 10/19/2022    Procedure: COLONOSCOPY;  Surgeon: Shukri Garcia MD;  Location: [facility] ENDOSCOPY;  Service: General;  Laterality: N/A;  INTERNAL HEMORRHOIDS   • D & C HYSTEROSCOPY ENDOMETRIAL ABLATION     • ENDOSCOPY N/A 10/19/2022    Procedure: ESOPHAGOGASTRODUODENOSCOPY WITH BX;  Surgeon: Shukri Garcia MD;  Location: [facility] ENDOSCOPY;  Service: General;  Laterality: N/A;  GASTRITIS   • FOOT SURGERY Bilateral    • FOREARM SURGERY Left     skin graft    • LAPAROSCOPIC CHOLECYSTECTOMY        Health Maintenance Due   Topic Date Due   • COVID-19 Vaccine (1) Never done   • TDAP/TD VACCINES (2 - Tdap) 05/07/2013   • ZOSTER VACCINE  (1 of 2) Never done   • MAMMOGRAM  10/02/2017   • LIPID PANEL  07/16/2021   • ANNUAL PHYSICAL  11/26/2021   • INFLUENZA VACCINE  08/01/2022      Immunization History   Administered Date(s) Administered   • Flu Vaccine Intradermal Quad 18-64YR 11/25/2020   • Td 05/07/2003

## 2022-12-12 ENCOUNTER — TELEPHONE (OUTPATIENT)
Dept: FAMILY MEDICINE CLINIC | Age: 57
End: 2022-12-12

## 2022-12-12 DIAGNOSIS — J30.9 ALLERGIC RHINITIS, UNSPECIFIED SEASONALITY, UNSPECIFIED TRIGGER: Primary | ICD-10-CM

## 2022-12-12 RX ORDER — MONTELUKAST SODIUM 10 MG/1
10 TABLET ORAL NIGHTLY
Qty: 90 TABLET | Refills: 3 | Status: SHIPPED | OUTPATIENT
Start: 2022-12-12

## 2022-12-12 NOTE — TELEPHONE ENCOUNTER
Caller: Quentin Lissettecuba Roth    Relationship: Self    Best call back number:    [phone]     Requested Prescriptions:   Requested Prescriptions     Pending Prescriptions Disp Refills   • montelukast (SINGULAIR) 10 MG tablet       Sig: Take 1 tablet by mouth Every Night.        Pharmacy where request should be sent: [facility] [address] [place] [address] [facility] [phone]  - [phone] FX     Additional details provided by patient: PATIENT IS OUT OF MEDICATION    Does the patient have less than a 3 day supply:  [x] Yes  [] No    Would you like a call back once the refill request has been completed: [] Yes [] No    If the office needs to give you a call back, can they leave a voicemail: [] Yes [] No    Ravinder Talley Rep   12/12/22 12:30 EST

## 2023-03-07 ENCOUNTER — OFFICE VISIT (OUTPATIENT)
Dept: FAMILY MEDICINE CLINIC | Age: 58
End: 2023-03-07
Payer: COMMERCIAL

## 2023-03-07 ENCOUNTER — TRANSCRIBE ORDERS (OUTPATIENT)
Dept: ADMINISTRATIVE | Facility: HOSPITAL | Age: 58
End: 2023-03-07
Payer: COMMERCIAL

## 2023-03-07 ENCOUNTER — LAB (OUTPATIENT)
Dept: LAB | Facility: HOSPITAL | Age: 58
End: 2023-03-07
Payer: COMMERCIAL

## 2023-03-07 VITALS
HEART RATE: 108 BPM | DIASTOLIC BLOOD PRESSURE: 89 MMHG | SYSTOLIC BLOOD PRESSURE: 149 MMHG | TEMPERATURE: 97.9 F | HEIGHT: 67 IN | OXYGEN SATURATION: 97 % | WEIGHT: 249.6 LBS | BODY MASS INDEX: 39.18 KG/M2

## 2023-03-07 DIAGNOSIS — R73.09 ELEVATED GLUCOSE LEVEL: ICD-10-CM

## 2023-03-07 DIAGNOSIS — T14.8XXA BRUISING: Primary | ICD-10-CM

## 2023-03-07 DIAGNOSIS — Z13.6 SCREENING FOR CARDIOVASCULAR CONDITION: ICD-10-CM

## 2023-03-07 DIAGNOSIS — L65.9 HAIR LOSS: ICD-10-CM

## 2023-03-07 DIAGNOSIS — I10 PRIMARY HYPERTENSION: ICD-10-CM

## 2023-03-07 DIAGNOSIS — G57.82 NEUROMA OF THIRD INTERSPACE OF LEFT FOOT: Primary | ICD-10-CM

## 2023-03-07 DIAGNOSIS — S99.922A PLANTAR PLATE INJURY, LEFT, INITIAL ENCOUNTER: ICD-10-CM

## 2023-03-07 DIAGNOSIS — T14.8XXA BRUISING: ICD-10-CM

## 2023-03-07 DIAGNOSIS — Z12.31 ENCOUNTER FOR SCREENING MAMMOGRAM FOR MALIGNANT NEOPLASM OF BREAST: ICD-10-CM

## 2023-03-07 LAB
ALBUMIN SERPL-MCNC: 3.9 G/DL (ref 3.5–5.2)
ALBUMIN/GLOB SERPL: 1.2 G/DL
ALP SERPL-CCNC: 73 U/L (ref 39–117)
ALT SERPL W P-5'-P-CCNC: 22 U/L (ref 1–33)
ANION GAP SERPL CALCULATED.3IONS-SCNC: 9.8 MMOL/L (ref 5–15)
AST SERPL-CCNC: 16 U/L (ref 1–32)
BASOPHILS # BLD AUTO: 0.04 10*3/MM3 (ref 0–0.2)
BASOPHILS NFR BLD AUTO: 0.3 % (ref 0–1.5)
BILIRUB SERPL-MCNC: 1.1 MG/DL (ref 0–1.2)
BUN SERPL-MCNC: 12 MG/DL (ref 6–20)
BUN/CREAT SERPL: 14.6 (ref 7–25)
CALCIUM SPEC-SCNC: 9.7 MG/DL (ref 8.6–10.5)
CHLORIDE SERPL-SCNC: 103 MMOL/L (ref 98–107)
CHOLEST SERPL-MCNC: 249 MG/DL (ref 0–200)
CO2 SERPL-SCNC: 27.2 MMOL/L (ref 22–29)
CREAT SERPL-MCNC: 0.82 MG/DL (ref 0.57–1)
DEPRECATED RDW RBC AUTO: 49.7 FL (ref 37–54)
EGFRCR SERPLBLD CKD-EPI 2021: 83 ML/MIN/1.73
EOSINOPHIL # BLD AUTO: 0.12 10*3/MM3 (ref 0–0.4)
EOSINOPHIL NFR BLD AUTO: 0.9 % (ref 0.3–6.2)
ERYTHROCYTE [DISTWIDTH] IN BLOOD BY AUTOMATED COUNT: 14.4 % (ref 12.3–15.4)
GLOBULIN UR ELPH-MCNC: 3.3 GM/DL
GLUCOSE SERPL-MCNC: 96 MG/DL (ref 65–99)
HCT VFR BLD AUTO: 45.7 % (ref 34–46.6)
HDLC SERPL-MCNC: 55 MG/DL (ref 40–60)
HGB BLD-MCNC: 14.9 G/DL (ref 12–15.9)
IMM GRANULOCYTES # BLD AUTO: 0.03 10*3/MM3 (ref 0–0.05)
IMM GRANULOCYTES NFR BLD AUTO: 0.2 % (ref 0–0.5)
LDLC SERPL CALC-MCNC: 169 MG/DL (ref 0–100)
LDLC/HDLC SERPL: 3.01 {RATIO}
LYMPHOCYTES # BLD AUTO: 2.72 10*3/MM3 (ref 0.7–3.1)
LYMPHOCYTES NFR BLD AUTO: 19.9 % (ref 19.6–45.3)
MCH RBC QN AUTO: 30.3 PG (ref 26.6–33)
MCHC RBC AUTO-ENTMCNC: 32.6 G/DL (ref 31.5–35.7)
MCV RBC AUTO: 93.1 FL (ref 79–97)
MONOCYTES # BLD AUTO: 0.92 10*3/MM3 (ref 0.1–0.9)
MONOCYTES NFR BLD AUTO: 6.7 % (ref 5–12)
NEUTROPHILS NFR BLD AUTO: 72 % (ref 42.7–76)
NEUTROPHILS NFR BLD AUTO: 9.81 10*3/MM3 (ref 1.7–7)
PLATELET # BLD AUTO: 279 10*3/MM3 (ref 140–450)
PMV BLD AUTO: 9.2 FL (ref 6–12)
POTASSIUM SERPL-SCNC: 4 MMOL/L (ref 3.5–5.2)
PROT SERPL-MCNC: 7.2 G/DL (ref 6–8.5)
RBC # BLD AUTO: 4.91 10*6/MM3 (ref 3.77–5.28)
SODIUM SERPL-SCNC: 140 MMOL/L (ref 136–145)
TRIGL SERPL-MCNC: 141 MG/DL (ref 0–150)
TSH SERPL DL<=0.05 MIU/L-ACNC: 0.66 UIU/ML (ref 0.27–4.2)
VLDLC SERPL-MCNC: 25 MG/DL (ref 5–40)
WBC NRBC COR # BLD: 13.64 10*3/MM3 (ref 3.4–10.8)

## 2023-03-07 PROCEDURE — 36415 COLL VENOUS BLD VENIPUNCTURE: CPT

## 2023-03-07 PROCEDURE — 83036 HEMOGLOBIN GLYCOSYLATED A1C: CPT

## 2023-03-07 PROCEDURE — 99214 OFFICE O/P EST MOD 30 MIN: CPT | Performed by: NURSE PRACTITIONER

## 2023-03-07 PROCEDURE — 80050 GENERAL HEALTH PANEL: CPT

## 2023-03-07 PROCEDURE — 80061 LIPID PANEL: CPT

## 2023-03-07 RX ORDER — OMEPRAZOLE 20 MG/1
20 CAPSULE, DELAYED RELEASE ORAL DAILY
COMMUNITY

## 2023-03-07 RX ORDER — LOSARTAN POTASSIUM 25 MG/1
25 TABLET ORAL DAILY
Qty: 30 TABLET | Refills: 2 | Status: SHIPPED | OUTPATIENT
Start: 2023-03-07

## 2023-03-07 NOTE — PROGRESS NOTES
Chief Complaint  Lissette Saavedra presents to [facility] for bruises (Pt concerned re bruising on arms, would like to have blood work done. )      Subjective     History of Present Illness  Lissette is concerned over recent bruising over the past 4 months  No frequent use of NSAIDs, no supplements. She does not do any more strenuous activity that she has always done. She confirms that she is getting steroid injections in her knees every 3 months.  This did start after she started this.  She denies any blood in urine or stool.  No excessive bleeding     Lissette  presents today with concerns over findings of elevated blood pressure readings over her last few visits.  Readings have been systolic 140-160s and diastolic upper 80-90s This has been going on for several  months.  Reported symptoms include tiredness/fatigue.   Lissette  has not been on medication in the past.  Family history is positive sister/father for hypertension.  Personal risk factors for cardiovascular disease include:  dyslipidemia, obesity (BMI >= 30 kg/m2) and sedentary lifestyle.  Lissette has not tried lifestyle modifications to help lower blood pressure and is willing to start medication at this time.      Lissette is also due for a mammogram and would like to get this scheduled     Lissette reports that she has been having excessive hair loss.  She confirms that she did recently have severe covid illness about 3-4 months ago , this started about 1-2 months ago.  No history thyroid disorder.  It is not faling out in patches- it is diffuse hair loss         Assessment and Plan       Diagnoses and all orders for this visit:    1. Bruising (Primary)  Comments:  I suspect this is due to her use of the steriod injections in the knees - she will discuss this with ortho if alternative options available.  But will check lab  Orders:  -     Comprehensive metabolic panel; Future  -     CBC & Differential; Future    2. Primary  "hypertension  Comments:  start low dose BP medication - follow up as scheduled in 8 weeks  weight loss/ low sodium intake will help as well.    Orders:  -     losartan (Cozaar) 25 MG tablet; Take 1 tablet by mouth Daily.  Dispense: 30 tablet; Refill: 2    3. Hair loss  Comments:  suspect due to recent covid infection.  discussed course of hair loss/cycles;  will check tsh   Orders:  -     TSH Rfx On Abnormal To Free T4; Future    4. Elevated glucose level  Comments:  checking A1C  Orders:  -     Hemoglobin A1c; Future    5. Encounter for screening mammogram for malignant neoplasm of breast  -     Mammo Screening Digital Tomosynthesis Bilateral With CAD; Future    6. Screening for cardiovascular condition  -     Lipid panel; Future        Follow Up   Return in about 8 weeks (around 5/2/2023) for Recheck, Pending lab results.      New Medications Ordered This Visit   Medications   • losartan (Cozaar) 25 MG tablet     Sig: Take 1 tablet by mouth Daily.     Dispense:  30 tablet     Refill:  2       Medications Discontinued During This Encounter   Medication Reason   • pantoprazole (Protonix) 20 MG EC tablet Alternate therapy   • EQ ClearLax 17 GM/SCOOP powder *Therapy completed   • benzonatate (TESSALON) 200 MG capsule *Therapy completed            Review of Systems    Objective     Vitals:    03/07/23 1047   BP: 149/89   BP Location: Right arm   Patient Position: Sitting   Cuff Size: Adult   Pulse: 108   Temp: 97.9 °F (36.6 °C)   TempSrc: Oral   SpO2: 97%   Weight: 113 kg (249 lb 9.6 oz)   Height: 168.9 cm (66.5\")     Body mass index is 39.68 kg/m².     Physical Exam       Result Review                       Allergies   Allergen Reactions   • Ace Inhibitors Cough   • Aspirin Unknown - Low Severity   • Salicylates Swelling      Past Medical History:   Diagnosis Date   • Acute vaginitis    • Allergic    • Arthritis    • Candidiasis of skin and nail    • Diarrhea    • Essential (primary) hypertension    • Family history " of diabetes mellitus    • Hyperlipidemia    • Low back pain    • Menopausal and female climacteric states    • Neuropathy    • Other long term (current) drug therapy    • Other specified polyneuropathies    • Pain in left knee    • Varicose veins of left lower extremity with inflammation      Current Outpatient Medications   Medication Sig Dispense Refill   • gabapentin (NEURONTIN) 300 MG capsule Take 1 capsule by mouth 2 (Two) Times a Day.     • montelukast (SINGULAIR) 10 MG tablet Take 1 tablet by mouth Every Night. 90 tablet 3   • omeprazole (priLOSEC) 20 MG capsule Take 1 capsule by mouth Daily.     • probiotic (CULTURELLE) capsule capsule Take 1 capsule by mouth Daily.     • losartan (Cozaar) 25 MG tablet Take 1 tablet by mouth Daily. 30 tablet 2     No current facility-administered medications for this visit.     Past Surgical History:   Procedure Laterality Date   • COLONOSCOPY N/A 10/19/2022    Procedure: COLONOSCOPY;  Surgeon: Shukri Garcia MD;  Location: [facility] ENDOSCOPY;  Service: General;  Laterality: N/A;  INTERNAL HEMORRHOIDS   • D & C HYSTEROSCOPY ENDOMETRIAL ABLATION     • ENDOMETRIAL ABLATION     • ENDOSCOPY N/A 10/19/2022    Procedure: ESOPHAGOGASTRODUODENOSCOPY WITH BX;  Surgeon: Shukri Garcia MD;  Location: [facility] ENDOSCOPY;  Service: General;  Laterality: N/A;  GASTRITIS   • FOOT SURGERY Bilateral    • FOREARM SURGERY Left     skin graft    • LAPAROSCOPIC CHOLECYSTECTOMY     • TONSILLECTOMY        Health Maintenance Due   Topic Date Due   • TDAP/TD VACCINES (2 - Tdap) 05/07/2013   • MAMMOGRAM  10/02/2017   • ANNUAL PHYSICAL  11/25/2021      Immunization History   Administered Date(s) Administered   • Flu Vaccine Intradermal Quad 18-64YR 11/25/2020   • Td 05/07/2003           EMR Dragon/Transcription disclaimer:  This encounter note may contain some electronic transcription/translation of spoken language to printed text. The electronic translation of spoken language may permit erroneous, or  at times, nonsensical words or phrases to be inadvertently transcribed; Although I have reviewed the note for such errors, some may still exist.        Mattie Ontiveros, APRN

## 2023-03-08 PROBLEM — R73.09 ELEVATED GLUCOSE LEVEL: Status: ACTIVE | Noted: 2023-03-08

## 2023-03-08 PROBLEM — I10 PRIMARY HYPERTENSION: Status: ACTIVE | Noted: 2023-03-08

## 2023-03-08 LAB — HBA1C MFR BLD: 5.2 % (ref 4.8–5.6)

## 2023-03-09 ENCOUNTER — HOSPITAL ENCOUNTER (OUTPATIENT)
Dept: MRI IMAGING | Facility: HOSPITAL | Age: 58
Discharge: HOME | End: 2023-03-09
Admitting: PODIATRIST
Payer: COMMERCIAL

## 2023-03-09 DIAGNOSIS — G57.82 NEUROMA OF THIRD INTERSPACE OF LEFT FOOT: ICD-10-CM

## 2023-03-09 DIAGNOSIS — S99.922A PLANTAR PLATE INJURY, LEFT, INITIAL ENCOUNTER: ICD-10-CM

## 2023-03-09 PROCEDURE — 73720 MRI LWR EXTREMITY W/O&W/DYE: CPT

## 2023-03-09 PROCEDURE — A9577 INJ MULTIHANCE: HCPCS | Performed by: PODIATRIST

## 2023-03-09 PROCEDURE — 0 GADOBENATE DIMEGLUMINE 529 MG/ML SOLUTION: Performed by: PODIATRIST

## 2023-03-09 RX ADMIN — GADOBENATE DIMEGLUMINE 20 ML: 529 INJECTION, SOLUTION INTRAVENOUS at 15:57

## 2023-03-12 DIAGNOSIS — D72.829 LEUKOCYTOSIS, UNSPECIFIED TYPE: Primary | ICD-10-CM

## 2023-03-20 ENCOUNTER — HOSPITAL ENCOUNTER (OUTPATIENT)
Dept: GENERAL RADIOLOGY | Facility: HOSPITAL | Age: 58
Discharge: HOME | End: 2023-03-20
Admitting: NURSE PRACTITIONER
Payer: COMMERCIAL

## 2023-03-20 ENCOUNTER — OFFICE VISIT (OUTPATIENT)
Dept: FAMILY MEDICINE CLINIC | Age: 58
End: 2023-03-20
Payer: COMMERCIAL

## 2023-03-20 VITALS
WEIGHT: 241.8 LBS | DIASTOLIC BLOOD PRESSURE: 93 MMHG | HEART RATE: 101 BPM | BODY MASS INDEX: 37.95 KG/M2 | TEMPERATURE: 97.9 F | HEIGHT: 67 IN | SYSTOLIC BLOOD PRESSURE: 144 MMHG

## 2023-03-20 DIAGNOSIS — M25.561 ACUTE PAIN OF RIGHT KNEE: ICD-10-CM

## 2023-03-20 DIAGNOSIS — M25.561 ACUTE PAIN OF RIGHT KNEE: Primary | ICD-10-CM

## 2023-03-20 PROCEDURE — 99214 OFFICE O/P EST MOD 30 MIN: CPT | Performed by: NURSE PRACTITIONER

## 2023-03-20 PROCEDURE — 73562 X-RAY EXAM OF KNEE 3: CPT

## 2023-03-20 RX ORDER — NAPROXEN 500 MG/1
500 TABLET ORAL 2 TIMES DAILY PRN
Qty: 40 TABLET | Refills: 0 | Status: SHIPPED | OUTPATIENT
Start: 2023-03-20

## 2023-03-20 NOTE — PROGRESS NOTES
Chief Complaint  Lissette Saavedra presents to [facility] for Knee Pain (Right knee pain, no pressure, fell down stairs, X 1 day )    Subjective          History of Present Illness    Lissette is here today with c/o knee pain. She twisted her left ankle and fell. Has been seeing podiatry for her left ankle. Fell on left knee. Twisted right knee. Right knee swollen. Has already been seeing ortho Dr Seo and receiving knee injections for OA. She notes that her main concern is the right knee. She cannot stand on that knee. It gives out on her. She cannot stand to stock shelves. She has applied ice. She has gabapentin to take as needed for her left ankle. She has taken naproxen in the past and does okay with it.     Review of Systems      Allergies   Allergen Reactions   • Ace Inhibitors Cough   • Aspirin Unknown - Low Severity   • Salicylates Swelling      Past Medical History:   Diagnosis Date   • Acute vaginitis    • Allergic    • Arthritis    • Candidiasis of skin and nail    • Diarrhea    • Essential (primary) hypertension    • Family history of diabetes mellitus    • GERD (gastroesophageal reflux disease)    • Hyperlipidemia    • Low back pain    • Menopausal and female climacteric states    • Neuropathy    • Other long term (current) drug therapy    • Other specified polyneuropathies    • Pain in left knee    • Varicose veins of left lower extremity with inflammation      Current Outpatient Medications   Medication Sig Dispense Refill   • gabapentin (NEURONTIN) 300 MG capsule Take 1 capsule by mouth 2 (Two) Times a Day.     • losartan (Cozaar) 25 MG tablet Take 1 tablet by mouth Daily. 30 tablet 2   • montelukast (SINGULAIR) 10 MG tablet Take 1 tablet by mouth Every Night. 90 tablet 3   • omeprazole (priLOSEC) 20 MG capsule Take 1 capsule by mouth Daily.     • probiotic (CULTURELLE) capsule capsule Take 1 capsule by mouth Daily.     • naproxen (Naprosyn) 500 MG tablet Take 1 tablet  "by mouth 2 (Two) Times a Day As Needed for Mild Pain. 40 tablet 0     No current facility-administered medications for this visit.     Past Surgical History:   Procedure Laterality Date   • COLONOSCOPY N/A 10/19/2022    Procedure: COLONOSCOPY;  Surgeon: Shukri Garcia MD;  Location: [facility] ENDOSCOPY;  Service: General;  Laterality: N/A;  INTERNAL HEMORRHOIDS   • D & C HYSTEROSCOPY ENDOMETRIAL ABLATION     • ENDOMETRIAL ABLATION     • ENDOSCOPY N/A 10/19/2022    Procedure: ESOPHAGOGASTRODUODENOSCOPY WITH BX;  Surgeon: Shukri Garcia MD;  Location: [facility] ENDOSCOPY;  Service: General;  Laterality: N/A;  GASTRITIS   • FOOT SURGERY Bilateral    • FOREARM SURGERY Left     skin graft    • LAPAROSCOPIC CHOLECYSTECTOMY     • TONSILLECTOMY        Social History     Tobacco Use   • Smoking status: Never     Passive exposure: Never   • Smokeless tobacco: Never   Vaping Use   • Vaping Use: Never used   Substance Use Topics   • Alcohol use: Not Currently   • Drug use: Never     Family History   Problem Relation Age of Onset   • Coronary artery disease Mother    • Osteoarthritis Mother    • Thyroid disease Mother    • Coronary artery disease Father    • Alzheimer's disease Father    • Diabetes type II Father    • Arthritis Father    • Diabetes Father    • Heart disease Father    • Hyperlipidemia Father    • Malig Hyperthermia Neg Hx      Health Maintenance Due   Topic Date Due   • TDAP/TD VACCINES (2 - Tdap) 05/07/2013   • MAMMOGRAM  10/02/2017   • ANNUAL PHYSICAL  11/25/2021      Immunization History   Administered Date(s) Administered   • Flu Vaccine Intradermal Quad 18-64YR 11/25/2020   • Td 05/07/2003        Objective     Vitals:    03/20/23 1030 03/20/23 1036   BP: 166/97 144/93   BP Location: Left arm Right arm   Patient Position: Sitting Sitting   Cuff Size: Large Adult Large Adult   Pulse: 101 101   Temp: 97.9 °F (36.6 °C)    TempSrc: Oral    Weight: 110 kg (241 lb 12.8 oz)    Height: 168.9 cm (66.5\")      Body mass " index is 38.44 kg/m².     Physical Exam  Vitals reviewed.   Constitutional:       General: She is not in acute distress.     Appearance: Normal appearance. She is well-developed.   HENT:      Head: Normocephalic and atraumatic.   Cardiovascular:      Rate and Rhythm: Normal rate.   Pulmonary:      Effort: Pulmonary effort is normal.   Musculoskeletal:      Right knee: Swelling present. Tenderness present.   Neurological:      Mental Status: She is alert and oriented to person, place, and time.   Psychiatric:         Mood and Affect: Mood and affect normal.           Result Review :     The following data was reviewed by: EDDA Crowder on 03/20/2023:          XR Knee 3 View Right (03/20/2023 11:09)                  Assessment and Plan      Diagnoses and all orders for this visit:    1. Acute pain of right knee (Primary)  -     XR Knee 3 View Right; Future  -     naproxen (Naprosyn) 500 MG tablet; Take 1 tablet by mouth 2 (Two) Times a Day As Needed for Mild Pain.  Dispense: 40 tablet; Refill: 0  -     Ambulatory Referral to Orthopedic Surgery      No fracture or significant joint effusion on right knee xray. Mild OA. Recommend rest, ice, elevation. She will trial Naproxen. Reports that she has previously tolerated. Will get her back in with her ortho Dr Seo.         Follow Up     Return for As needed for persistent or worsening symptoms.

## 2023-03-31 ENCOUNTER — TELEPHONE (OUTPATIENT)
Dept: FAMILY MEDICINE CLINIC | Age: 58
End: 2023-03-31
Payer: COMMERCIAL

## 2023-03-31 NOTE — TELEPHONE ENCOUNTER
1st attempt: spoke with pt re overdue labs ordered on 3.12.23, states she will have them done this coming up week.

## 2023-04-12 ENCOUNTER — OFFICE VISIT (OUTPATIENT)
Dept: FAMILY MEDICINE CLINIC | Age: 58
End: 2023-04-12
Payer: COMMERCIAL

## 2023-04-12 VITALS
TEMPERATURE: 97.5 F | OXYGEN SATURATION: 98 % | HEART RATE: 109 BPM | WEIGHT: 243.8 LBS | SYSTOLIC BLOOD PRESSURE: 149 MMHG | BODY MASS INDEX: 38.27 KG/M2 | DIASTOLIC BLOOD PRESSURE: 82 MMHG | HEIGHT: 67 IN

## 2023-04-12 DIAGNOSIS — J30.9 ALLERGIC RHINITIS, UNSPECIFIED SEASONALITY, UNSPECIFIED TRIGGER: ICD-10-CM

## 2023-04-12 DIAGNOSIS — J06.9 VIRAL UPPER RESPIRATORY TRACT INFECTION: Primary | ICD-10-CM

## 2023-04-12 PROCEDURE — 99213 OFFICE O/P EST LOW 20 MIN: CPT | Performed by: NURSE PRACTITIONER

## 2023-04-12 RX ORDER — ALBUTEROL SULFATE 90 UG/1
2 AEROSOL, METERED RESPIRATORY (INHALATION) EVERY 4 HOURS PRN
Qty: 6.7 G | Refills: 1 | Status: SHIPPED | OUTPATIENT
Start: 2023-04-12

## 2023-04-12 RX ORDER — BENZONATATE 200 MG/1
200 CAPSULE ORAL 3 TIMES DAILY PRN
Qty: 30 CAPSULE | Refills: 0 | Status: SHIPPED | OUTPATIENT
Start: 2023-04-12

## 2023-04-12 RX ORDER — GUAIFENESIN 600 MG/1
600 TABLET, EXTENDED RELEASE ORAL 2 TIMES DAILY PRN
Qty: 20 TABLET | Refills: 0 | Status: SHIPPED | OUTPATIENT
Start: 2023-04-12

## 2023-04-12 RX ORDER — CETIRIZINE HYDROCHLORIDE 10 MG/1
10 TABLET ORAL DAILY
Qty: 30 TABLET | Refills: 0 | Status: SHIPPED | OUTPATIENT
Start: 2023-04-12

## 2023-04-12 NOTE — PROGRESS NOTES
Chief Complaint  Lissette Saavedra presents to [facility] for Cough (Coughing up yellow mucus X 3 days ), Nasal Congestion (X 1 day ), Vomiting (X 1 day from drainage ), and Earache (Left ear pain X 1 day )      Subjective     History of Present Illness  Lissette  here today for concerns of URI symptoms  or possible covid / flu.      Known Exposure to positive case?  No  Date of exposure? n /a  Date of symptoms start?   4 days - allergies started some time ago   (Symptoms may appear 2-14 days after exposure )    Fever or chills?  No  Cough?   yes  Shortness of breath or difficulty breathing?  no  Fatigue?   no  Muscle or body aches?  no   Headache?   no  New loss of taste or smell? no  Sore throat?  no  Congestion or runny nose? yes  Nausea or vomiting?   Yes  Vomiting   Diarrhea?   no    Taking any medications at home to help with symptoms?Yes, describe: robitussin  Any prior vaccine to covid?   no  Any prior vaccine to flu this season? no  Any significant health problems / existing lung / heart problems?  No      Any  emergency warning signs for COVID-19. No  Trouble breathing?    Persistent pain or pressure in the chest?    New confusion?   Inability to wake or stay awake?  Pale, gray, or blue-colored skin, lips, or nail beds, depending on skin tone?               Assessment and Plan       Diagnoses and all orders for this visit:    1. Viral upper respiratory tract infection (Primary)  Comments:  Suspect that antibiotics will not be of benefit recommend saline nasal sprays cough control congestion control and follow-up if new or worsening symptoms  Orders:  -     benzonatate (TESSALON) 200 MG capsule; Take 1 capsule by mouth 3 (Three) Times a Day As Needed for Cough.  Dispense: 30 capsule; Refill: 0  -     guaiFENesin (MUCINEX) 600 MG 12 hr tablet; Take 1 tablet by mouth 2 (Two) Times a Day As Needed for Cough or Congestion.  Dispense: 20 tablet; Refill: 0  -     albuterol sulfate  " (90 Base) MCG/ACT inhaler; Inhale 2 puffs Every 4 (Four) Hours As Needed for Wheezing.  Dispense: 6.7 g; Refill: 1    2. Allergic rhinitis, unspecified seasonality, unspecified trigger  -     cetirizine (zyrTEC) 10 MG tablet; Take 1 tablet by mouth Daily.  Dispense: 30 tablet; Refill: 0        Follow Up   Return if symptoms worsen or fail to improve.      New Medications Ordered This Visit   Medications   • benzonatate (TESSALON) 200 MG capsule     Sig: Take 1 capsule by mouth 3 (Three) Times a Day As Needed for Cough.     Dispense:  30 capsule     Refill:  0   • guaiFENesin (MUCINEX) 600 MG 12 hr tablet     Sig: Take 1 tablet by mouth 2 (Two) Times a Day As Needed for Cough or Congestion.     Dispense:  20 tablet     Refill:  0   • albuterol sulfate  (90 Base) MCG/ACT inhaler     Sig: Inhale 2 puffs Every 4 (Four) Hours As Needed for Wheezing.     Dispense:  6.7 g     Refill:  1   • cetirizine (zyrTEC) 10 MG tablet     Sig: Take 1 tablet by mouth Daily.     Dispense:  30 tablet     Refill:  0       Medications Discontinued During This Encounter   Medication Reason   • naproxen (Naprosyn) 500 MG tablet *Therapy completed            Review of Systems    Objective     Vitals:    04/12/23 0913   BP: 149/82   BP Location: Right arm   Patient Position: Sitting   Cuff Size: Adult   Pulse: 109   Temp: 97.5 °F (36.4 °C)   TempSrc: Oral   SpO2: 98%   Weight: 111 kg (243 lb 12.8 oz)   Height: 168.9 cm (66.5\")     Body mass index is 38.76 kg/m².     Physical Exam  Vitals and nursing note reviewed.   Constitutional:       Appearance: She is ill-appearing.   HENT:      Right Ear: Tympanic membrane and ear canal normal. Drainage and tenderness present. No middle ear effusion. There is no impacted cerumen. Tympanic membrane is not scarred or erythematous.      Left Ear: Tympanic membrane and ear canal normal. Drainage and tenderness present.  No middle ear effusion. There is no impacted cerumen. Tympanic membrane is " not scarred or erythematous.      Nose:      Right Sinus: No maxillary sinus tenderness or frontal sinus tenderness.      Left Sinus: No maxillary sinus tenderness or frontal sinus tenderness.      Mouth/Throat:      Pharynx: No pharyngeal swelling, oropharyngeal exudate, posterior oropharyngeal erythema or uvula swelling.      Tonsils: No tonsillar exudate. 0 on the right. 0 on the left.   Cardiovascular:      Rate and Rhythm: Normal rate and regular rhythm.   Pulmonary:      Effort: Pulmonary effort is normal.      Breath sounds: Normal breath sounds. No wheezing or rhonchi.   Lymphadenopathy:      Head:      Right side of head: No submandibular or preauricular adenopathy.      Left side of head: No submandibular or preauricular adenopathy.   Skin:     General: Skin is warm and dry.   Neurological:      Mental Status: She is alert and oriented to person, place, and time.   Psychiatric:         Attention and Perception: Attention and perception normal.         Behavior: Behavior normal.            Result Review                       Allergies   Allergen Reactions   • Ace Inhibitors Cough   • Aspirin Unknown - Low Severity   • Salicylates Swelling      Past Medical History:   Diagnosis Date   • Acute vaginitis    • Allergic    • Arthritis    • Candidiasis of skin and nail    • Diarrhea    • Essential (primary) hypertension    • Family history of diabetes mellitus    • GERD (gastroesophageal reflux disease)    • Hyperlipidemia    • Low back pain    • Menopausal and female climacteric states    • Neuropathy    • Other long term (current) drug therapy    • Other specified polyneuropathies    • Pain in left knee    • Varicose veins of left lower extremity with inflammation      Current Outpatient Medications   Medication Sig Dispense Refill   • gabapentin (NEURONTIN) 300 MG capsule Take 1 capsule by mouth 2 (Two) Times a Day.     • losartan (Cozaar) 25 MG tablet Take 1 tablet by mouth Daily. 30 tablet 2   • montelukast  (SINGULAIR) 10 MG tablet Take 1 tablet by mouth Every Night. 90 tablet 3   • omeprazole (priLOSEC) 20 MG capsule Take 1 capsule by mouth Daily.     • probiotic (CULTURELLE) capsule capsule Take 1 capsule by mouth Daily.     • albuterol sulfate  (90 Base) MCG/ACT inhaler Inhale 2 puffs Every 4 (Four) Hours As Needed for Wheezing. 6.7 g 1   • amoxicillin-clavulanate (Augmentin) 875-125 MG per tablet Take 1 tablet by mouth 2 (Two) Times a Day for 7 days. 14 tablet 0   • benzonatate (TESSALON) 200 MG capsule Take 1 capsule by mouth 3 (Three) Times a Day As Needed for Cough. 30 capsule 0   • cetirizine (zyrTEC) 10 MG tablet Take 1 tablet by mouth Daily. 30 tablet 0   • guaiFENesin (MUCINEX) 600 MG 12 hr tablet Take 1 tablet by mouth 2 (Two) Times a Day As Needed for Cough or Congestion. 20 tablet 0     No current facility-administered medications for this visit.     Past Surgical History:   Procedure Laterality Date   • COLONOSCOPY N/A 10/19/2022    Procedure: COLONOSCOPY;  Surgeon: Shukri Garcia MD;  Location: [facility] ENDOSCOPY;  Service: General;  Laterality: N/A;  INTERNAL HEMORRHOIDS   • D & C HYSTEROSCOPY ENDOMETRIAL ABLATION     • ENDOMETRIAL ABLATION     • ENDOSCOPY N/A 10/19/2022    Procedure: ESOPHAGOGASTRODUODENOSCOPY WITH BX;  Surgeon: Shukri Garcia MD;  Location: [facility] ENDOSCOPY;  Service: General;  Laterality: N/A;  GASTRITIS   • FOOT SURGERY Bilateral    • FOREARM SURGERY Left     skin graft    • LAPAROSCOPIC CHOLECYSTECTOMY     • TONSILLECTOMY        Health Maintenance Due   Topic Date Due   • COVID-19 Vaccine (1) Never done   • TDAP/TD VACCINES (2 - Tdap) 05/07/2013   • MAMMOGRAM  10/02/2017   • ANNUAL PHYSICAL  11/25/2021      Immunization History   Administered Date(s) Administered   • Flu Vaccine Intradermal Quad 18-64YR 11/25/2020   • Td 05/07/2003         Part of this note may be an electronic transcription/translation of spoken language to printed   text using the Dragon Dictation  System.      Mattie Ontiveros, APRN

## 2023-04-17 ENCOUNTER — OFFICE VISIT (OUTPATIENT)
Dept: FAMILY MEDICINE CLINIC | Age: 58
End: 2023-04-17
Payer: COMMERCIAL

## 2023-04-17 ENCOUNTER — HOSPITAL ENCOUNTER (OUTPATIENT)
Dept: GENERAL RADIOLOGY | Facility: HOSPITAL | Age: 58
Discharge: HOME | End: 2023-04-17
Payer: COMMERCIAL

## 2023-04-17 VITALS
HEART RATE: 96 BPM | DIASTOLIC BLOOD PRESSURE: 90 MMHG | SYSTOLIC BLOOD PRESSURE: 149 MMHG | OXYGEN SATURATION: 98 % | HEIGHT: 66 IN | WEIGHT: 238 LBS | BODY MASS INDEX: 38.25 KG/M2 | TEMPERATURE: 98.6 F

## 2023-04-17 DIAGNOSIS — R05.1 ACUTE COUGH: ICD-10-CM

## 2023-04-17 DIAGNOSIS — I10 PRIMARY HYPERTENSION: ICD-10-CM

## 2023-04-17 DIAGNOSIS — J06.9 ACUTE URI: Primary | ICD-10-CM

## 2023-04-17 PROCEDURE — 71046 X-RAY EXAM CHEST 2 VIEWS: CPT

## 2023-04-17 RX ORDER — AMOXICILLIN AND CLAVULANATE POTASSIUM 875; 125 MG/1; MG/1
1 TABLET, FILM COATED ORAL 2 TIMES DAILY
Qty: 14 TABLET | Refills: 0 | Status: SHIPPED | OUTPATIENT
Start: 2023-04-17 | End: 2023-04-17

## 2023-04-17 RX ORDER — AMOXICILLIN AND CLAVULANATE POTASSIUM 875; 125 MG/1; MG/1
1 TABLET, FILM COATED ORAL 2 TIMES DAILY
Qty: 14 TABLET | Refills: 0 | Status: SHIPPED | OUTPATIENT
Start: 2023-04-17 | End: 2023-04-24

## 2023-04-17 NOTE — Clinical Note
I saw this patient today for an acute visit. I gave her a work note to return on 4/19/23. She saw you last week and is requesting a note for last week as she has not gone back to work yet.

## 2023-04-17 NOTE — PROGRESS NOTES
"Subjective     CHIEF COMPLAINT    Chief Complaint   Patient presents with   • Cough     Was seen by MM on 04/12/23. Pt states she \"isn't much better\". Dx with URI     History of Present Illness  Patient is a 57-year-old female who presents to the clinic today with complaints of cough, congestion and headaches.  Denies any fever or chills.  Denies any shortness of breath, wheezing or chest pain.  Symptoms have been present for about a week.  She saw her primary care provider on 4- and was diagnosed with a viral URI.  She was prescribed Mucinex, Zyrtec and Tessalon Perles.  She reports no improvement.  Denies any known exposure to any illnesses.  Denies any chronic lung issues.    Review of Systems   Constitutional: Negative for chills and fever.   HENT: Positive for congestion. Negative for sore throat.    Respiratory: Positive for cough. Negative for shortness of breath and wheezing.    Cardiovascular: Negative for chest pain.   Gastrointestinal: Negative for nausea and vomiting.   Musculoskeletal: Negative for myalgias.   Neurological: Positive for headaches.     Allergies   Allergen Reactions   • Ace Inhibitors Cough   • Aspirin Unknown - Low Severity   • Salicylates Swelling     Current Outpatient Medications on File Prior to Visit   Medication Sig Dispense Refill   • albuterol sulfate  (90 Base) MCG/ACT inhaler Inhale 2 puffs Every 4 (Four) Hours As Needed for Wheezing. 6.7 g 1   • benzonatate (TESSALON) 200 MG capsule Take 1 capsule by mouth 3 (Three) Times a Day As Needed for Cough. 30 capsule 0   • cetirizine (zyrTEC) 10 MG tablet Take 1 tablet by mouth Daily. 30 tablet 0   • gabapentin (NEURONTIN) 300 MG capsule Take 1 capsule by mouth 2 (Two) Times a Day.     • guaiFENesin (MUCINEX) 600 MG 12 hr tablet Take 1 tablet by mouth 2 (Two) Times a Day As Needed for Cough or Congestion. 20 tablet 0   • losartan (Cozaar) 25 MG tablet Take 1 tablet by mouth Daily. 30 tablet 2   • montelukast (SINGULAIR) " "10 MG tablet Take 1 tablet by mouth Every Night. 90 tablet 3   • omeprazole (priLOSEC) 20 MG capsule Take 1 capsule by mouth Daily.     • probiotic (CULTURELLE) capsule capsule Take 1 capsule by mouth Daily.       No current facility-administered medications on file prior to visit.     /90 (BP Location: Right arm, Patient Position: Sitting, Cuff Size: Large Adult)   Pulse 96   Temp 98.6 °F (37 °C) (Oral)   Ht 168.9 cm (66.5\")   Wt 108 kg (238 lb)   SpO2 98%   BMI 37.84 kg/m²     Objective     Physical Exam  Vitals and nursing note reviewed.   Constitutional:       General: She is not in acute distress.     Appearance: Normal appearance. She is not ill-appearing.   HENT:      Head: Normocephalic.      Right Ear: Tympanic membrane, ear canal and external ear normal.      Left Ear: Tympanic membrane, ear canal and external ear normal.      Nose: Congestion present.      Right Sinus: No maxillary sinus tenderness or frontal sinus tenderness.      Left Sinus: No maxillary sinus tenderness or frontal sinus tenderness.      Mouth/Throat:      Lips: Pink.      Mouth: Mucous membranes are moist.      Pharynx: Oropharynx is clear. Uvula midline. No pharyngeal swelling, oropharyngeal exudate, posterior oropharyngeal erythema or uvula swelling.   Eyes:      Extraocular Movements: Extraocular movements intact.      Pupils: Pupils are equal, round, and reactive to light.   Cardiovascular:      Rate and Rhythm: Normal rate and regular rhythm.      Heart sounds: Normal heart sounds. No murmur heard.  Pulmonary:      Effort: Pulmonary effort is normal. No accessory muscle usage or respiratory distress.      Breath sounds: Normal breath sounds. No wheezing or rhonchi.   Musculoskeletal:      Cervical back: Normal range of motion.   Lymphadenopathy:      Cervical: No cervical adenopathy.   Skin:     General: Skin is warm and dry.   Neurological:      General: No focal deficit present.      Mental Status: She is alert and " oriented to person, place, and time.   Psychiatric:         Mood and Affect: Mood and affect normal.         Behavior: Behavior normal.          Lab Results (last 24 hours)     Procedure Component Value Units Date/Time    POCT SARS-CoV-2 Antigen MAUREEN + Flu [[id number]] Collected: 04/17/23 1329    Specimen: Swab Updated: 04/17/23 1329     SARS Antigen Not Detected     Influenza A Antigen MAUREEN Not Detected     Influenza B Antigen MAUREEN Not Detected     Internal Control Passed     Lot Number [id number]     Expiration Date 12/6/23        XR Chest PA & Lateral    Result Date: 4/17/2023  PROCEDURE: XR CHEST PA AND LATERAL  COMPARISON: [facility], CR, XR CHEST 1 VW, 7/11/2022, 11:53.  INDICATIONS: COUGH, CONGESTION X 1 WEEK  FINDINGS:  The heart size is normal and the lungs are clear       No active disease     Joss Paige MD       Electronically Signed and Approved By: Joss Paige MD on 4/17/2023 at 15:19                   Diagnoses and all orders for this visit:    1. Acute URI (Primary)  -     amoxicillin-clavulanate (Augmentin) 875-125 MG per tablet; Take 1 tablet by mouth 2 (Two) Times a Day for 7 days.  Dispense: 14 tablet; Refill: 0    2. Acute cough  -     POCT SARS-CoV-2 Antigen MAUREEN + Flu  -     XR Chest PA & Lateral; Future    3. Primary hypertension  Comments:  BP slightly elevated today, recommend ambulatory monitoring. Follow up with PCP.     Other orders  -     Discontinue: amoxicillin-clavulanate (Augmentin) 875-125 MG per tablet; Take 1 tablet by mouth 2 (Two) Times a Day for 7 days.  Dispense: 14 tablet; Refill: 0      No concerning findings on exam today. Will start patient on Augmentin for URI given duration of symptoms. Will obtain chest x ray to rule out pneumonia. Increase fluids, rest. Continue mucinex and allergy medication. Return to clinic if symptoms worsen or do not improve. Proceed to ER for any chest pain or shortness of breath. Patient voiced understanding of all instructions and  had no further questions at this time.     Addendum: Chest x ray shows no active disease. Continue with plan of care as above.      Follow up:  Return if symptoms worsen or fail to improve.  Patient was given instructions and counseling regarding her condition or for health maintenance advice. Please see specific information pulled into the AVS if appropriate.

## 2023-05-03 ENCOUNTER — LAB (OUTPATIENT)
Dept: LAB | Facility: HOSPITAL | Age: 58
End: 2023-05-03
Payer: COMMERCIAL

## 2023-05-03 DIAGNOSIS — D72.829 LEUKOCYTOSIS, UNSPECIFIED TYPE: ICD-10-CM

## 2023-05-03 LAB
BASOPHILS # BLD AUTO: 0.04 10*3/MM3 (ref 0–0.2)
BASOPHILS NFR BLD AUTO: 0.4 % (ref 0–1.5)
CRP SERPL-MCNC: 0.59 MG/DL (ref 0–0.5)
DEPRECATED RDW RBC AUTO: 49.5 FL (ref 37–54)
EOSINOPHIL # BLD AUTO: 0.14 10*3/MM3 (ref 0–0.4)
EOSINOPHIL NFR BLD AUTO: 1.6 % (ref 0.3–6.2)
ERYTHROCYTE [DISTWIDTH] IN BLOOD BY AUTOMATED COUNT: 14.2 % (ref 12.3–15.4)
ERYTHROCYTE [SEDIMENTATION RATE] IN BLOOD: 38 MM/HR (ref 0–30)
HCT VFR BLD AUTO: 44.7 % (ref 34–46.6)
HGB BLD-MCNC: 14.4 G/DL (ref 12–15.9)
IMM GRANULOCYTES # BLD AUTO: 0.02 10*3/MM3 (ref 0–0.05)
IMM GRANULOCYTES NFR BLD AUTO: 0.2 % (ref 0–0.5)
LYMPHOCYTES # BLD AUTO: 2.75 10*3/MM3 (ref 0.7–3.1)
LYMPHOCYTES NFR BLD AUTO: 30.7 % (ref 19.6–45.3)
MCH RBC QN AUTO: 30.4 PG (ref 26.6–33)
MCHC RBC AUTO-ENTMCNC: 32.2 G/DL (ref 31.5–35.7)
MCV RBC AUTO: 94.5 FL (ref 79–97)
MONOCYTES # BLD AUTO: 0.65 10*3/MM3 (ref 0.1–0.9)
MONOCYTES NFR BLD AUTO: 7.2 % (ref 5–12)
NEUTROPHILS NFR BLD AUTO: 5.37 10*3/MM3 (ref 1.7–7)
NEUTROPHILS NFR BLD AUTO: 59.9 % (ref 42.7–76)
PLATELET # BLD AUTO: 348 10*3/MM3 (ref 140–450)
PMV BLD AUTO: 9.8 FL (ref 6–12)
RBC # BLD AUTO: 4.73 10*6/MM3 (ref 3.77–5.28)
WBC NRBC COR # BLD: 8.97 10*3/MM3 (ref 3.4–10.8)

## 2023-05-03 PROCEDURE — 36415 COLL VENOUS BLD VENIPUNCTURE: CPT

## 2023-05-03 PROCEDURE — 85652 RBC SED RATE AUTOMATED: CPT

## 2023-05-03 PROCEDURE — 85025 COMPLETE CBC W/AUTO DIFF WBC: CPT

## 2023-05-03 PROCEDURE — 86140 C-REACTIVE PROTEIN: CPT

## 2023-05-17 ENCOUNTER — HOSPITAL ENCOUNTER (OUTPATIENT)
Dept: MAMMOGRAPHY | Facility: HOSPITAL | Age: 58
Discharge: HOME | End: 2023-05-17
Admitting: NURSE PRACTITIONER
Payer: COMMERCIAL

## 2023-05-17 DIAGNOSIS — Z12.31 ENCOUNTER FOR SCREENING MAMMOGRAM FOR MALIGNANT NEOPLASM OF BREAST: ICD-10-CM

## 2023-05-17 PROCEDURE — 77067 SCR MAMMO BI INCL CAD: CPT

## 2023-05-17 PROCEDURE — 77063 BREAST TOMOSYNTHESIS BI: CPT

## 2023-05-24 ENCOUNTER — OFFICE VISIT (OUTPATIENT)
Dept: FAMILY MEDICINE CLINIC | Age: 58
End: 2023-05-24
Payer: COMMERCIAL

## 2023-05-24 VITALS
DIASTOLIC BLOOD PRESSURE: 80 MMHG | HEIGHT: 66 IN | SYSTOLIC BLOOD PRESSURE: 153 MMHG | HEART RATE: 79 BPM | OXYGEN SATURATION: 99 % | WEIGHT: 247 LBS | BODY MASS INDEX: 39.7 KG/M2

## 2023-05-24 DIAGNOSIS — R21 RASH: Primary | ICD-10-CM

## 2023-05-24 PROCEDURE — 99213 OFFICE O/P EST LOW 20 MIN: CPT | Performed by: NURSE PRACTITIONER

## 2023-05-24 RX ORDER — HYDROCORTISONE 10 MG/ML
LOTION TOPICAL 2 TIMES DAILY
Qty: 118 ML | Refills: 0 | Status: SHIPPED | OUTPATIENT
Start: 2023-05-24

## 2023-05-24 RX ORDER — METHYLPREDNISOLONE 4 MG/1
TABLET ORAL
Qty: 21 TABLET | Refills: 0 | Status: SHIPPED | OUTPATIENT
Start: 2023-05-24

## 2023-05-24 RX ORDER — CEPHALEXIN 500 MG/1
500 CAPSULE ORAL 2 TIMES DAILY
Qty: 20 CAPSULE | Refills: 0 | Status: SHIPPED | OUTPATIENT
Start: 2023-05-24 | End: 2023-06-03

## 2023-05-24 NOTE — PROGRESS NOTES
Chief Complaint  Lissette Saavedra presents to [facility] for Rash (No pain, no itching. Jaw line both sides. )      Subjective     History of Present Illness  Juancarlos is here today with concerns over a developing rash on the side of her face at her jawline bilaterally.  She reports this started about a week ago and its almost completely asymptomatic with occasional itching.  She was concerned that this may have been shingles that she got her shingles vaccine about 2 or 3 weeks ago.  She is completely asymptomatic otherwise.        Assessment and Plan       Diagnoses and all orders for this visit:    1. Rash (Primary)  Comments:  Suspect folliculitis.  Antibacterial soap washes will treat with antibiotic.  Medrol pack sent in to start if no improvement on antibiotic after 2 to 3 days.  Orders:  -     methylPREDNISolone (Medrol) 4 MG dose pack; Take as directed on package instructions.  Dispense: 21 tablet; Refill: 0  -     hydrocortisone 1 % lotion; Apply  topically to the appropriate area as directed 2 (Two) Times a Day.  Dispense: 118 mL; Refill: 0  -     cephalexin (Keflex) 500 MG capsule; Take 1 capsule by mouth 2 (Two) Times a Day for 10 days.  Dispense: 20 capsule; Refill: 0        Follow Up   Return if symptoms worsen or fail to improve.      New Medications Ordered This Visit   Medications   • methylPREDNISolone (Medrol) 4 MG dose pack     Sig: Take as directed on package instructions.     Dispense:  21 tablet     Refill:  0   • hydrocortisone 1 % lotion     Sig: Apply  topically to the appropriate area as directed 2 (Two) Times a Day.     Dispense:  118 mL     Refill:  0   • cephalexin (Keflex) 500 MG capsule     Sig: Take 1 capsule by mouth 2 (Two) Times a Day for 10 days.     Dispense:  20 capsule     Refill:  0       There are no discontinued medications.         Review of Systems    Objective     Vitals:    05/24/23 1644   BP: 153/80   BP Location: Right arm   Patient  "Position: Sitting   Pulse: 79   SpO2: 99%   Weight: 112 kg (247 lb)   Height: 168.3 cm (66.26\")     Body mass index is 39.55 kg/m².     Physical Exam  Constitutional:       General: She is not in acute distress.     Appearance: Normal appearance.   HENT:      Head: Normocephalic.   Cardiovascular:      Rate and Rhythm: Normal rate and regular rhythm.   Pulmonary:      Effort: Pulmonary effort is normal.      Breath sounds: Normal breath sounds.   Musculoskeletal:         General: Normal range of motion.   Skin:     Findings: Rash present. Rash is papular and pustular.      Comments: SEE PHOTOS   Neurological:      General: No focal deficit present.      Mental Status: She is alert and oriented to person, place, and time.   Psychiatric:         Mood and Affect: Mood normal.         Behavior: Behavior normal.            Result Review                       Allergies   Allergen Reactions   • Ace Inhibitors Cough   • Aspirin Unknown - Low Severity   • Salicylates Swelling      Past Medical History:   Diagnosis Date   • Acute vaginitis    • Allergic    • Arthritis    • Candidiasis of skin and nail    • Diarrhea    • Essential (primary) hypertension    • Family history of diabetes mellitus    • GERD (gastroesophageal reflux disease)    • Hyperlipidemia    • Low back pain    • Menopausal and female climacteric states    • Neuropathy    • Other long term (current) drug therapy    • Other specified polyneuropathies    • Pain in left knee    • Varicose veins of left lower extremity with inflammation      Current Outpatient Medications   Medication Sig Dispense Refill   • albuterol sulfate  (90 Base) MCG/ACT inhaler Inhale 2 puffs Every 4 (Four) Hours As Needed for Wheezing. 6.7 g 1   • benzonatate (TESSALON) 200 MG capsule Take 1 capsule by mouth 3 (Three) Times a Day As Needed for Cough. 30 capsule 0   • cetirizine (zyrTEC) 10 MG tablet Take 1 tablet by mouth Daily. 30 tablet 0   • gabapentin (NEURONTIN) 300 MG capsule " Take 1 capsule by mouth 2 (Two) Times a Day.     • guaiFENesin (MUCINEX) 600 MG 12 hr tablet Take 1 tablet by mouth 2 (Two) Times a Day As Needed for Cough or Congestion. 20 tablet 0   • losartan (Cozaar) 25 MG tablet Take 1 tablet by mouth Daily. 30 tablet 2   • montelukast (SINGULAIR) 10 MG tablet Take 1 tablet by mouth Every Night. 90 tablet 3   • omeprazole (priLOSEC) 20 MG capsule Take 1 capsule by mouth Daily.     • probiotic (CULTURELLE) capsule capsule Take 1 capsule by mouth Daily.     • cephalexin (Keflex) 500 MG capsule Take 1 capsule by mouth 2 (Two) Times a Day for 10 days. 20 capsule 0   • hydrocortisone 1 % lotion Apply  topically to the appropriate area as directed 2 (Two) Times a Day. 118 mL 0   • methylPREDNISolone (Medrol) 4 MG dose pack Take as directed on package instructions. 21 tablet 0     No current facility-administered medications for this visit.     Past Surgical History:   Procedure Laterality Date   • COLONOSCOPY N/A 10/19/2022    Procedure: COLONOSCOPY;  Surgeon: Shukri Garcia MD;  Location: [facility] ENDOSCOPY;  Service: General;  Laterality: N/A;  INTERNAL HEMORRHOIDS   • D & C HYSTEROSCOPY ENDOMETRIAL ABLATION     • ENDOMETRIAL ABLATION     • ENDOSCOPY N/A 10/19/2022    Procedure: ESOPHAGOGASTRODUODENOSCOPY WITH BX;  Surgeon: Shukri Garcia MD;  Location: [facility] ENDOSCOPY;  Service: General;  Laterality: N/A;  GASTRITIS   • FOOT SURGERY Bilateral    • FOREARM SURGERY Left     skin graft    • LAPAROSCOPIC CHOLECYSTECTOMY     • TONSILLECTOMY        Health Maintenance Due   Topic Date Due   • COVID-19 Vaccine (1) Never done   • ANNUAL PHYSICAL  11/25/2021      Immunization History   Administered Date(s) Administered   • Flu Vaccine Intradermal Quad 18-64YR 11/25/2020   • Shingrix 05/06/2023   • Td 05/07/2003   • Tdap 05/06/2023         Part of this note may be an electronic transcription/translation of spoken language to printed   text using the Dragon Dictation System.      Mattie  EDDA Ontiveros

## 2023-05-25 ENCOUNTER — TELEPHONE (OUTPATIENT)
Dept: FAMILY MEDICINE CLINIC | Age: 58
End: 2023-05-25
Payer: COMMERCIAL

## 2023-05-25 NOTE — TELEPHONE ENCOUNTER
----- Message from EDDA Salter sent at 5/24/2023  7:41 PM EDT -----  Please call Lissette in the morning and let her know that I did send her in an antibiotic.  The more that I looked at her pictures the more I would rather treat for folliculitis.HOLD STEROIDS  Have her only use the hydrocortisone cream if itching otherwise would recommend just antibacterial soap as we discussed the antibiotics and if no improvement after 2 to 3 days for her to start the steroid pack.

## 2023-08-04 DIAGNOSIS — M25.562 LEFT KNEE PAIN, UNSPECIFIED CHRONICITY: Primary | ICD-10-CM

## 2023-08-07 ENCOUNTER — OFFICE VISIT (OUTPATIENT)
Dept: ORTHOPEDIC SURGERY | Facility: CLINIC | Age: 58
End: 2023-08-07
Payer: COMMERCIAL

## 2023-08-07 ENCOUNTER — HOSPITAL ENCOUNTER (OUTPATIENT)
Dept: GENERAL RADIOLOGY | Facility: HOSPITAL | Age: 58
Discharge: HOME | End: 2023-08-07
Payer: COMMERCIAL

## 2023-08-07 ENCOUNTER — LAB (OUTPATIENT)
Dept: LAB | Facility: HOSPITAL | Age: 58
End: 2023-08-07
Payer: COMMERCIAL

## 2023-08-07 VITALS — TEMPERATURE: 98.3 F | BODY MASS INDEX: 37.77 KG/M2 | WEIGHT: 235 LBS | HEIGHT: 66 IN

## 2023-08-07 DIAGNOSIS — M17.12 PRIMARY OSTEOARTHRITIS OF LEFT KNEE: ICD-10-CM

## 2023-08-07 DIAGNOSIS — M25.562 LEFT KNEE PAIN, UNSPECIFIED CHRONICITY: ICD-10-CM

## 2023-08-07 DIAGNOSIS — M17.11 PRIMARY OSTEOARTHRITIS OF RIGHT KNEE: Primary | ICD-10-CM

## 2023-08-07 DIAGNOSIS — M25.50 POLYARTHRALGIA: ICD-10-CM

## 2023-08-07 LAB
ALBUMIN SERPL-MCNC: 4.5 G/DL (ref 3.5–5.2)
ALBUMIN/GLOB SERPL: 1.7 G/DL
ALP SERPL-CCNC: 85 U/L (ref 39–117)
ALT SERPL W P-5'-P-CCNC: 19 U/L (ref 1–33)
ANION GAP SERPL CALCULATED.3IONS-SCNC: 11 MMOL/L (ref 5–15)
AST SERPL-CCNC: 21 U/L (ref 1–32)
BASOPHILS # BLD AUTO: 0.05 10*3/MM3 (ref 0–0.2)
BASOPHILS NFR BLD AUTO: 0.5 % (ref 0–1.5)
BILIRUB SERPL-MCNC: 1 MG/DL (ref 0–1.2)
BUN SERPL-MCNC: 9 MG/DL (ref 6–20)
BUN/CREAT SERPL: 10.5 (ref 7–25)
CALCIUM SPEC-SCNC: 9.9 MG/DL (ref 8.6–10.5)
CHLORIDE SERPL-SCNC: 104 MMOL/L (ref 98–107)
CHROMATIN AB SERPL-ACNC: <10 IU/ML (ref 0–14)
CO2 SERPL-SCNC: 27 MMOL/L (ref 22–29)
CREAT SERPL-MCNC: 0.86 MG/DL (ref 0.57–1)
CRP SERPL-MCNC: 0.35 MG/DL (ref 0–0.5)
DEPRECATED RDW RBC AUTO: 48.3 FL (ref 37–54)
EGFRCR SERPLBLD CKD-EPI 2021: 77.9 ML/MIN/1.73
EOSINOPHIL # BLD AUTO: 0.12 10*3/MM3 (ref 0–0.4)
EOSINOPHIL NFR BLD AUTO: 1.2 % (ref 0.3–6.2)
ERYTHROCYTE [DISTWIDTH] IN BLOOD BY AUTOMATED COUNT: 13.9 % (ref 12.3–15.4)
ERYTHROCYTE [SEDIMENTATION RATE] IN BLOOD: 14 MM/HR (ref 0–30)
GLOBULIN UR ELPH-MCNC: 2.7 GM/DL
GLUCOSE SERPL-MCNC: 98 MG/DL (ref 65–99)
HCT VFR BLD AUTO: 45.6 % (ref 34–46.6)
HGB BLD-MCNC: 14.8 G/DL (ref 12–15.9)
IMM GRANULOCYTES # BLD AUTO: 0.02 10*3/MM3 (ref 0–0.05)
IMM GRANULOCYTES NFR BLD AUTO: 0.2 % (ref 0–0.5)
LYMPHOCYTES # BLD AUTO: 2.68 10*3/MM3 (ref 0.7–3.1)
LYMPHOCYTES NFR BLD AUTO: 26.6 % (ref 19.6–45.3)
MCH RBC QN AUTO: 30.5 PG (ref 26.6–33)
MCHC RBC AUTO-ENTMCNC: 32.5 G/DL (ref 31.5–35.7)
MCV RBC AUTO: 94 FL (ref 79–97)
MONOCYTES # BLD AUTO: 0.66 10*3/MM3 (ref 0.1–0.9)
MONOCYTES NFR BLD AUTO: 6.5 % (ref 5–12)
NEUTROPHILS NFR BLD AUTO: 6.56 10*3/MM3 (ref 1.7–7)
NEUTROPHILS NFR BLD AUTO: 65 % (ref 42.7–76)
PLATELET # BLD AUTO: 377 10*3/MM3 (ref 140–450)
PMV BLD AUTO: 10.1 FL (ref 6–12)
POTASSIUM SERPL-SCNC: 3.9 MMOL/L (ref 3.5–5.2)
PROT SERPL-MCNC: 7.2 G/DL (ref 6–8.5)
RBC # BLD AUTO: 4.85 10*6/MM3 (ref 3.77–5.28)
SODIUM SERPL-SCNC: 142 MMOL/L (ref 136–145)
URATE SERPL-MCNC: 6 MG/DL (ref 2.4–5.7)
WBC NRBC COR # BLD: 10.09 10*3/MM3 (ref 3.4–10.8)

## 2023-08-07 PROCEDURE — 85652 RBC SED RATE AUTOMATED: CPT

## 2023-08-07 PROCEDURE — 86431 RHEUMATOID FACTOR QUANT: CPT

## 2023-08-07 PROCEDURE — 36415 COLL VENOUS BLD VENIPUNCTURE: CPT

## 2023-08-07 PROCEDURE — 84550 ASSAY OF BLOOD/URIC ACID: CPT

## 2023-08-07 PROCEDURE — 86235 NUCLEAR ANTIGEN ANTIBODY: CPT

## 2023-08-07 PROCEDURE — 86038 ANTINUCLEAR ANTIBODIES: CPT

## 2023-08-07 PROCEDURE — 86225 DNA ANTIBODY NATIVE: CPT

## 2023-08-07 PROCEDURE — 73560 X-RAY EXAM OF KNEE 1 OR 2: CPT

## 2023-08-07 PROCEDURE — 80053 COMPREHEN METABOLIC PANEL: CPT

## 2023-08-07 PROCEDURE — 86140 C-REACTIVE PROTEIN: CPT

## 2023-08-07 PROCEDURE — 86200 CCP ANTIBODY: CPT

## 2023-08-07 PROCEDURE — 85025 COMPLETE CBC W/AUTO DIFF WBC: CPT

## 2023-08-07 RX ORDER — LIDOCAINE HYDROCHLORIDE 20 MG/ML
2 INJECTION, SOLUTION EPIDURAL; INFILTRATION; INTRACAUDAL; PERINEURAL
Status: COMPLETED | OUTPATIENT
Start: 2023-08-07 | End: 2023-08-07

## 2023-08-07 RX ORDER — METHYLPREDNISOLONE ACETATE 80 MG/ML
160 INJECTION, SUSPENSION INTRA-ARTICULAR; INTRALESIONAL; INTRAMUSCULAR; SOFT TISSUE
Status: COMPLETED | OUTPATIENT
Start: 2023-08-07 | End: 2023-08-07

## 2023-08-07 RX ADMIN — LIDOCAINE HYDROCHLORIDE 2 ML: 20 INJECTION, SOLUTION EPIDURAL; INFILTRATION; INTRACAUDAL; PERINEURAL at 14:29

## 2023-08-07 RX ADMIN — METHYLPREDNISOLONE ACETATE 160 MG: 80 INJECTION, SUSPENSION INTRA-ARTICULAR; INTRALESIONAL; INTRAMUSCULAR; SOFT TISSUE at 14:29

## 2023-08-07 NOTE — PROGRESS NOTES
"Chief Complaint  Initial Evaluation of the Left Knee and Initial Evaluation of the Right Knee    Subjective    History of Present Illness      Lissette Saavedra is a 58 y.o. female who presents to [facility] for complaint of Knee Pain:   Patient complains of bilateral knee pain.   The pain began several years ago.   The pain is located over patellar aspect.    She describes the symptoms as aching.   Symptoms improve with steroid injections.   The symptoms are worse with activity.   The knee has given out or felt unstable.      The patient has been seeing Dr. Seo and has been receiving steroid injections in both knees. She states that the injections last a couple of months. She has not tried anything else for this. The patient notes that her right knee is worse than the left. She describes the pain to be in the anterior aspect, constantly. She has utilized Tylenol, but states it was not effective. She states that her knee periodically gives out from underneath her when she is walking.   She also reports chronic pain in multiple joints and asks if there is anything she can do for this. She denies swelling in the joints.    The patient is allergic to aspirin.     The patient works at [employer].       Objective   Vital Signs:   Temp 98.3 øF (36.8 øC)   Ht 167.6 cm (66\")   Wt 107 kg (235 lb)   BMI 37.93 kg/mý       Physical Exam  Vitals signs and nursing note reviewed.   Constitutional:       Appearance: Normal appearance.   Pulmonary:      Effort: Pulmonary effort is normal.   Skin:     General: Skin is warm and dry.      Capillary Refill: Capillary refill takes less than 2 seconds.   Neurological:      General: No focal deficit present.      Mental Status: She is alert and oriented to person, place, and time. Mental status is at baseline.   Psychiatric:         Mood and Affect: Mood normal.         Behavior: Behavior normal.         Thought Content: Thought content normal.         " Judgment: Judgment normal.     Ortho Exam   BILATERAL knee  There is mild joint line tenderness at the medial aspect of the knee.   Positive for varus orientation of the knee.   Positive for crepitus throughout range of motion.   Negative for effusion.  Positive patellar grind test.   Negative Lachman test.    Negative anterior and posterior drawer.  Range of motion in extension and flexion is: 0-110 degrees.  Neurovascular status is intact.    Dorsalis pedis and posterior tibial artery pulses are palpable.    Common peroneal nerve function is well preserved.  Gait is cautious and antalgic.      Result Review :   Radiologic studies - see below for interpretation  LEFT knee xrays  weightbearing/standing 2 views were ordered by José Smith PA-C. Performed at [facility] on 8/7/2023. Images were independently viewed and interpreted by myself, my impression as follows:  Findings: Moderate tricompartmental osteoarthritis with most prominent findings at the medial compartment and in the patellofemoral compartment  Bony lesion: no  Soft tissues: within normal limits  Joint spaces: decreased  Hardware appropriately positioned: not applicable  Prior studies available for comparison: no      RIGHT knee xrays  nonweightbearing 3 views were performed at [facility] on 3/30/23. Images were independently viewed and interpreted by myself, my impression as follows:  Findings: Mild tricompartmental osteoarthritis with most prominent findings at the medial compartment where there is mild joint space narrowing, and osteophyte lipping at the medial femoral condyle          PROCEDURE  - Large Joint Arthrocentesis: bilateral knee on 8/7/2023 2:29 PM  Indications: pain  Details: 25 G needle, anteromedial approach  Medications (Right): 160 mg methylPREDNISolone acetate 80 MG/ML; 2 mL lidocaine PF 2% 2 %  Medications (Left): 160 mg methylPREDNISolone acetate 80 MG/ML; 2 mL lidocaine PF 2% 2  %  Outcome: tolerated well, no immediate complications  Procedure, treatment alternatives, risks and benefits explained, specific risks discussed. Immediately prior to procedure a time out was called to verify the correct patient, procedure, equipment, support staff and site/side marked as required. Patient was prepped and draped in the usual sterile fashion.                Assessment   Assessment and Plan    Diagnoses and all orders for this visit:    1. Primary osteoarthritis of right knee (Primary)  -     Visco Treatment; Future  -     - Large Joint Arthrocentesis    2. Primary osteoarthritis of left knee  -     Visco Treatment; Future  -     - Large Joint Arthrocentesis    3. Polyarthralgia  -     AVINASH by IFA, Reflex to Titer and Pattern; Future  -     C-reactive Protein; Future  -     Sedimentation Rate; Future  -     Rheumatoid Factor; Future  -     Anti-Smith Antibody; Future  -     Anti-DNA Antibody, Double-stranded; Future  -     RNP Antibodies; Future  -     Sjogren's Antibody, Anti-SS-A / -SS-B; Future  -     Uric Acid; Future  -     CBC & Differential; Future  -     Comprehensive Metabolic Panel; Future  -     Cyclic Citrul Peptide Antibody, IgG / IgA; Future             PLAN   Discussion of any imaging in detail. Discussion of orthopaedic goals.  Risk, benefits, and merits of treatment alternatives reviewed with the patient. Treatment alternatives include: intra-articular steroid injection, intra-articular visco supplementation, and eventual surgery  Ice, heat, and/or modalities as beneficial  Risks of minor surgical procedure/intra-articular injection, including but not limited to pain, bleeding, infection into the joint, pigment skin changes related to steroid administration, increased blood sugar levels secondary to steroid administration, scar, recurrence of pain, and tendon rupture associated with steroid administration and possible need for further surgery reviewed with patient.  She accepts these  risks and wishes to proceed with procedure. Injected patient's bilateral knee joint(s) with Depo-Medrol from a(n) anterolateral approach.  Patient tolerated the procedure well and no complications were encountered.   Watch for signs and symptoms of infection  Call or notify for any adverse effect from injection therapy  Patient is encouraged to call or return for any issues or concerns.  Follow up in 3 months, will try for gel approval  Patient was given instructions and counseling regarding her condition or for health maintenance advice. Please see specific information pulled into the AVS if appropriate.     José Smith PA-C   Date of Encounter: 8/7/2023       Transcribed from ambient dictation for José Smith PA-C by Stella Mota.  08/07/23   14:18 EDT    Patient or patient representative verbalized consent to the visit recording.  I have personally performed the services described in this document as transcribed by the above individual, and it is both accurate and complete.  José Smith PA-C  8/7/2023  14:31 EDT

## 2023-08-09 LAB
CCP IGA+IGG SERPL IA-ACNC: 4 UNITS (ref 0–19)
DSDNA AB SER-ACNC: <1 IU/ML (ref 0–9)
ENA RNP AB SER-ACNC: <0.2 AI (ref 0–0.9)
ENA SS-A AB SER-ACNC: <0.2 AI (ref 0–0.9)
ENA SS-B AB SER-ACNC: <0.2 AI (ref 0–0.9)

## 2023-08-10 LAB
ANA SER QL IF: NEGATIVE
ENA SM AB SER-ACNC: <0.2 AI (ref 0–0.9)

## 2023-08-11 NOTE — PROGRESS NOTES
Ms. Amrikjakis,    All of your labs came back within normal limits with the exception of the uric acid, which looks for gout. This is barely over the normal range and not necessarily something I would treat at this point. I also saw where you had a history of an elevated inflammatory marker, but it is not elevated today.   I can still refer you to rheumatology for an evaluation if you would like?    José

## 2023-09-11 ENCOUNTER — TRANSCRIBE ORDERS (OUTPATIENT)
Dept: PHYSICAL THERAPY | Facility: CLINIC | Age: 58
End: 2023-09-11
Payer: COMMERCIAL

## 2023-09-11 DIAGNOSIS — M25.532 BILATERAL WRIST PAIN: ICD-10-CM

## 2023-09-11 DIAGNOSIS — M25.522 BILATERAL ELBOW JOINT PAIN: ICD-10-CM

## 2023-09-11 DIAGNOSIS — M25.531 BILATERAL WRIST PAIN: ICD-10-CM

## 2023-09-11 DIAGNOSIS — G90.513: Primary | ICD-10-CM

## 2023-09-11 DIAGNOSIS — M25.521 BILATERAL ELBOW JOINT PAIN: ICD-10-CM

## 2023-09-15 ENCOUNTER — TREATMENT (OUTPATIENT)
Dept: PHYSICAL THERAPY | Facility: CLINIC | Age: 58
End: 2023-09-15
Payer: OTHER MISCELLANEOUS

## 2023-09-15 DIAGNOSIS — M25.632 DECREASED RANGE OF MOTION OF BOTH WRISTS: ICD-10-CM

## 2023-09-15 DIAGNOSIS — M25.522 BILATERAL ELBOW JOINT PAIN: ICD-10-CM

## 2023-09-15 DIAGNOSIS — M25.532 BILATERAL WRIST PAIN: Primary | ICD-10-CM

## 2023-09-15 DIAGNOSIS — M25.631 DECREASED RANGE OF MOTION OF BOTH WRISTS: ICD-10-CM

## 2023-09-15 DIAGNOSIS — M25.521 BILATERAL ELBOW JOINT PAIN: ICD-10-CM

## 2023-09-15 DIAGNOSIS — R29.898 WEAKNESS OF BOTH ARMS: ICD-10-CM

## 2023-09-15 DIAGNOSIS — G90.512 COMPLEX REGIONAL PAIN SYNDROME TYPE 1 OF LEFT UPPER EXTREMITY: ICD-10-CM

## 2023-09-15 DIAGNOSIS — M25.531 BILATERAL WRIST PAIN: Primary | ICD-10-CM

## 2023-09-15 PROCEDURE — 97161 PT EVAL LOW COMPLEX 20 MIN: CPT | Performed by: PHYSICAL THERAPIST

## 2023-09-15 NOTE — PROGRESS NOTES
Physical Therapy Initial Evaluation and Plan of Care      Patient: Lissette Saavedra   : 1965  Diagnosis/ICD-10 Code:  Bilateral wrist pain [M25.531, M25.532]  Referring practitioner: EDDA Shah  Date of Initial Visit: 9/15/2023  Today's Date: 9/15/2023  Patient seen for 1 sessions         Visit Diagnoses:    ICD-10-CM ICD-9-CM   1. Bilateral wrist pain  M25.531 719.43    M25.532    2. Bilateral elbow joint pain  M25.521 719.42    M25.522    3. Complex regional pain syndrome type 1 of left upper extremity  G90.512 337.21   4. Decreased range of motion of both wrists  M25.631 719.53    M25.632    5. Weakness of both arms  R29.898 729.89         Subjective Evaluation    History of Present Illness  Mechanism of injury: iLssette fell 23 at [facility].  The door opening has changed, now there is concrete that sticks out the bottom.  She tripped going out the door, landed out on both arms/hands. Her coworker took her to [facility], imaging was done.  She had a previous R wrist fracture, but did not fracture it after this fall.  She bruised B wrist, L knee and R hip.    She was off work for about a week, but she is on light duty.  She opens up boxes/stocks shelves.  She is on a 5 lb weight limit.     She is going at 10am today to [facility] for a weekly check up.      Pain in mainly on the R lateral side, 5th finger, 5th knuckle.  The pain is improving. She has a strain on the R arm with lifting.    The left side has more pain.  She can make a fist with her L hand.  She cannot open tops, cannot lift anything heavy.  The left elbow is sore, especially with lifting.     Pain  Current pain ratin  Quality: dull ache and tight  Aggravating factors: overhead activity, outstretched reach, repetitive movement and movement    Hand dominance: right    Treatments  Previous treatment: physical therapy  Patient Goals  Patient goals for therapy: decreased edema, decreased pain, increased motion, increased strength,  independence with ADLs/IADLs and return to sport/leisure activities           Objective          Tenderness     Additional Tenderness Details  R lateral wrist , 5th knuckle    L ulnar styloid, L elbow    Active Range of Motion     Left Wrist   Wrist flexion: 50 degrees   Wrist extension: 40 degrees   Radial deviation: 30 degrees   Ulnar deviation: 20 degrees     Right Wrist   Wrist flexion: 15 degrees   Wrist extension: 20 degrees   Radial deviation: 10 degrees   Ulnar deviation: 10 degrees with pain    Additional Active Range of Motion Details  Elbow flexion/extension WFL R/L    Elbow pronation WFL R/L  Elbow supination to 60 degrees on the L, 70 degrees on the R    Shoudler ROM WFL R/L    Strength/Myotome Testing     Left Wrist/Hand   Wrist extension: 4  Wrist flexion: 4  Radial deviation: 3+  Ulnar deviation: 4-     (2nd hand position)     Trial 1: 30 lbs    Right Wrist/Hand   Wrist extension: 4-  Wrist flexion: 4-  Radial deviation: 3+  Ulnar deviation: 3+     (2nd hand position)     Trial 1: 26 lbs    Additional Strength Details  Elbow R/L flexion/extension MMT 4/5 both directions, R/L    No radicular symptoms, no redness    She is wearing a R wrist brace, she is on a 5 lb weight restriction at work        Assessment & Plan       Assessment  Impairments: abnormal coordination, abnormal muscle firing, abnormal or restricted ROM, activity intolerance, impaired physical strength, lacks appropriate home exercise program and pain with function   Functional limitations: carrying objects, lifting, sleeping, pulling, pushing, uncomfortable because of pain, reaching behind back, reaching overhead and unable to perform repetitive tasks   Assessment details: Pt presents with limitations, noted below, that impede her ability to open jars, heavy lifting, carry shopping bags, cutting food, recreational activities, work duty full, social activities, sleeping, and some ADLs. The skills of a therapist will be required  to safely and effectively implement the following treatment plan to restore maximal level of function.        Goals  Plan Goals: 1. The patient has limited ROM of the B wrist  LTG 1: 12 weeks:  The patient will demonstrate 80 degrees of flexion 70 degrees of extension, 30 degrees of ulnar deviation, 20 degrees of radial deviation, and 80 degrees of forearm pronation, and 80 degrees of forearm supination to allow the patient to perform ADLs.    STATUS:  New   STG 1a: 4 weeks:  The patient will demonstrate 60 degrees of bilateral wrist flexion, 60 degrees of wrist extension, 25 degrees of wrist ulnar deviation, and 25 degrees of wrist radial deviation, and 80 degrees of forearm pronation, and 70 degrees of forearm supination.    STATUS:  New   TREATMENT: Manual therapy, therapeutic exercise, home exercise instruction, and modalities as needed to include: electrical stimulation, ultrasound, moist heat, and ice.     2. The patient has limited strength of the B wrist.  LTG 2: 12 weeks:  The patient will demonstrate 4+/5 strength for B wrist flexion, extension, ulnar deviation, radial deviation and forearm pronation and supination in order to demonstrate improved wrist stability.    STATUS:  New   STG 2a: 4 weeks:  The patient will demonstrate 4/5 strength for B wrist flexion, extension, ulnar deviation, radial deviation and forearm pronation and supination.    STATUS:  New   STG2b:  4 weeks:  The patient will be independent with home exercises.     STATUS:  New   TREATMENT: Manual therapy, therapeutic exercise, home exercise instruction, and modalities as needed to include: electrical stimulation, ultrasound, moist heat, and ice.    3. The patient complains of pain to the B wrist and elbow   LTG 3: 12 weeks:  The patient will report a pain rating of 2/10 or better in order to improve sleep quality and tolerance to performance of activities of daily living.    STATUS:  New   STG 3a: 6 weeks:  The patient will report a  pain rating of 4/10 or better.     STATUS:  New   TREATMENT: Manual therapy, therapeutic exercise, home exercise instruction, and modalities as needed to include: electrical stimulation, ultrasound, moist heat, and ice.      4. The patient has limited strength of the B elbow   LTG 4: 12 weeks:  The patient will demonstrate 5/5 strength for B elbow flexion, extension and forearm pronation, supination in order to demonstrate improved elbow stability.    STATUS:  New   STG 2a: 4 weeks:  The patient will demonstrate 4+/5 strength for B elbow flexion, extension and forearm pronation, supination.    STATUS:  New    TREATMENT: Manual therapy, therapeutic exercise, home exercise instruction, and modalities as needed to include: electrical stimulation, ultrasound, moist heat, and ice.       Plan  Therapy options: will be seen for skilled therapy services  Planned modality interventions: TENS, electrical stimulation/Lao stimulation, ultrasound and dry needling  Planned therapy interventions: manual therapy, body mechanics training, ADL retraining, fine motor coordination training, flexibility, functional ROM exercises, home exercise program, IADL retraining, joint mobilization, motor coordination training, neuromuscular re-education, soft tissue mobilization, strengthening, stretching and therapeutic activities  Frequency: 2x week  Duration in weeks: 12  Treatment plan discussed with: patient  Plan details: Create an HEP, update as needed.    Progress with B wrist/elbow strength, fine motor control, coordination, decreased tightness, improved ROM/flexibility, education as needed.          History # of Personal Factors and/or Comorbidities: MODERATE (1-2)  Examination of Body System(s): # of elements: MODERATE (3)  Clinical Presentation: STABLE   Clinical Decision Making: LOW     Timed:  Manual Therapy:         mins  17919;  Therapeutic Exercise:         mins  78212;     Neuromuscular Monika:        mins  59495;     Therapeutic Activity:          mins  39950;     Gait Training:           mins  32764;     Ultrasound:          mins  55841;    Canalith Repos           ___  mins  21535      Untimed:  Electrical Stimulation:         mins  46240 ( );  Mechanical Traction:         mins  59093;   Dry Needling:                     mins self pay  Low Eval:                      30     mins  97020;  Medium Eval:                     mins  88507;   High Eval:                          mins  60773       Timed Treatment:      mins   Total Treatment:     30   mins    PT SIGNATURE: Eliane Khalil PT, DPT  [place] License: [id number]  Electronically signed by Eliane Khalil PT, 09/15/23, 6:53 AM EDT      Initial Certification    Certification Period: 9/15/2023 thru 12/13/2023  I certify that the therapy services are furnished while this patient is under my care.  The services outlined above are required by this patient, and will be reviewed every 90 days.     PHYSICIAN: Stephen Ayon APRN  NPI: [id number]            PHYSICIAN PRINT NAME: ______________________________________________      PHYSICIAN SIGNATURE: ______________________________________________         DATE:________________________________        Please sign and return via fax to [phone].  Thank you, [facility].

## 2023-09-20 ENCOUNTER — TREATMENT (OUTPATIENT)
Dept: PHYSICAL THERAPY | Facility: CLINIC | Age: 58
End: 2023-09-20
Payer: COMMERCIAL

## 2023-09-20 DIAGNOSIS — R29.898 WEAKNESS OF BOTH ARMS: ICD-10-CM

## 2023-09-20 DIAGNOSIS — M25.631 DECREASED RANGE OF MOTION OF BOTH WRISTS: ICD-10-CM

## 2023-09-20 DIAGNOSIS — M25.521 BILATERAL ELBOW JOINT PAIN: ICD-10-CM

## 2023-09-20 DIAGNOSIS — M25.532 BILATERAL WRIST PAIN: Primary | ICD-10-CM

## 2023-09-20 DIAGNOSIS — G90.512 COMPLEX REGIONAL PAIN SYNDROME TYPE 1 OF LEFT UPPER EXTREMITY: ICD-10-CM

## 2023-09-20 DIAGNOSIS — M25.522 BILATERAL ELBOW JOINT PAIN: ICD-10-CM

## 2023-09-20 DIAGNOSIS — M25.632 DECREASED RANGE OF MOTION OF BOTH WRISTS: ICD-10-CM

## 2023-09-20 DIAGNOSIS — M25.531 BILATERAL WRIST PAIN: Primary | ICD-10-CM

## 2023-09-20 PROCEDURE — 97110 THERAPEUTIC EXERCISES: CPT | Performed by: PHYSICAL THERAPIST

## 2023-09-20 PROCEDURE — 97530 THERAPEUTIC ACTIVITIES: CPT | Performed by: PHYSICAL THERAPIST

## 2023-09-20 NOTE — PROGRESS NOTES
Physical Therapy Daily Treatment Note      Patient: Lissette Saavedra   : 1965  Referring practitioner: EDDA Shah  Date of Initial Visit: Type: THERAPY  Noted: 9/15/2023  Today's Date: 2023  Patient seen for 2 sessions           Subjective Evaluation    History of Present Illness    Subjective comment: 0/10 at tx time and 6/10 when she is picking something up that puts pressure on the wrist.     Objective   See Exercise, Manual, and Modality Logs for complete treatment.       Assessment & Plan       Assessment  Impairments: abnormal coordination, abnormal muscle firing, abnormal or restricted ROM, activity intolerance, impaired physical strength, lacks appropriate home exercise program and pain with function   Functional limitations: carrying objects, lifting, sleeping, pulling, pushing, uncomfortable because of pain, reaching behind back, reaching overhead and unable to perform repetitive tasks   Assessment details: Pt has more pain in the L wrist and forearm then the R. There is some discomfort with exercises at this visit mostly with  and twist. Ice massage may be beneficial in future visits.    Goals  Plan Goals: 1. The patient has limited ROM of the B wrist  LTG 1: 12 weeks:  The patient will demonstrate 80 degrees of flexion 70 degrees of extension, 30 degrees of ulnar deviation, 20 degrees of radial deviation, and 80 degrees of forearm pronation, and 80 degrees of forearm supination to allow the patient to perform ADLs.    STATUS:  Progressing   STG 1a: 4 weeks:  The patient will demonstrate 60 degrees of bilateral wrist flexion, 60 degrees of wrist extension, 25 degrees of wrist ulnar deviation, and 25 degrees of wrist radial deviation, and 80 degrees of forearm pronation, and 70 degrees of forearm supination.    STATUS:  Progressing    TREATMENT: Manual therapy, therapeutic exercise, home exercise instruction, and modalities as needed to include: electrical stimulation,  ultrasound, moist heat, and ice.     2. The patient has limited strength of the B wrist.  LTG 2: 12 weeks:  The patient will demonstrate 4+/5 strength for B wrist flexion, extension, ulnar deviation, radial deviation and forearm pronation and supination in order to demonstrate improved wrist stability.    STATUS:  Progressing    STG 2a: 4 weeks:  The patient will demonstrate 4/5 strength for B wrist flexion, extension, ulnar deviation, radial deviation and forearm pronation and supination.    STATUS:  Progressing    STG2b:  4 weeks:  The patient will be independent with home exercises.     STATUS:  Progressing    TREATMENT: Manual therapy, therapeutic exercise, home exercise instruction, and modalities as needed to include: electrical stimulation, ultrasound, moist heat, and ice.    3. The patient complains of pain to the B wrist and elbow   LTG 3: 12 weeks:  The patient will report a pain rating of 2/10 or better in order to improve sleep quality and tolerance to performance of activities of daily living.    STATUS:  Progressing    STG 3a: 6 weeks:  The patient will report a pain rating of 4/10 or better.     STATUS:  Progressing    TREATMENT: Manual therapy, therapeutic exercise, home exercise instruction, and modalities as needed to include: electrical stimulation, ultrasound, moist heat, and ice.      4. The patient has limited strength of the B elbow   LTG 4: 12 weeks:  The patient will demonstrate 5/5 strength for B elbow flexion, extension and forearm pronation, supination in order to demonstrate improved elbow stability.    STATUS:  Progressing    STG 2a: 4 weeks:  The patient will demonstrate 4+/5 strength for B elbow flexion, extension and forearm pronation, supination.    STATUS:  Progressing     TREATMENT: Manual therapy, therapeutic exercise, home exercise instruction, and modalities as needed to include: electrical stimulation, ultrasound, moist heat, and ice.       Plan  Therapy options: will be seen  for skilled therapy services  Planned modality interventions: TENS, electrical stimulation/Chadian stimulation, ultrasound and dry needling  Planned therapy interventions: manual therapy, body mechanics training, ADL retraining, fine motor coordination training, flexibility, functional ROM exercises, home exercise program, IADL retraining, joint mobilization, motor coordination training, neuromuscular re-education, soft tissue mobilization, strengthening, stretching and therapeutic activities  Frequency: 2x week  Duration in weeks: 12  Treatment plan discussed with: patient  Plan details: Cont to address general weakness in the B wrist and UE as well as ROM and pain.          Visit Diagnoses:    ICD-10-CM ICD-9-CM   1. Bilateral wrist pain  M25.531 719.43    M25.532    2. Bilateral elbow joint pain  M25.521 719.42    M25.522    3. Complex regional pain syndrome type 1 of left upper extremity  G90.512 337.21   4. Decreased range of motion of both wrists  M25.631 719.53    M25.632    5. Weakness of both arms  R29.898 729.89       Progress per Plan of Care    Timed:    Therapeutic Exercise:    15     mins  49138;  Manual Therapy:         mins  20185;     Neuromuscular Monika:       mins  51373;    Therapeutic Activity:     14     mins  83071;     Gait Training:           mins  62993;     Ultrasound:          mins  40651;      Untimed:  Electrical Stimulation:         mins  39726 ( );  Mechanical Traction:         mins  79415;     Timed Treatment:   29   mins   Total Treatment:     31   mins      Bev Gray PTA  Physical Therapist Assistant

## 2023-09-22 ENCOUNTER — TREATMENT (OUTPATIENT)
Dept: PHYSICAL THERAPY | Facility: CLINIC | Age: 58
End: 2023-09-22
Payer: OTHER MISCELLANEOUS

## 2023-09-22 DIAGNOSIS — G90.512 COMPLEX REGIONAL PAIN SYNDROME TYPE 1 OF LEFT UPPER EXTREMITY: ICD-10-CM

## 2023-09-22 DIAGNOSIS — M25.532 BILATERAL WRIST PAIN: Primary | ICD-10-CM

## 2023-09-22 DIAGNOSIS — M25.631 DECREASED RANGE OF MOTION OF BOTH WRISTS: ICD-10-CM

## 2023-09-22 DIAGNOSIS — M25.522 BILATERAL ELBOW JOINT PAIN: ICD-10-CM

## 2023-09-22 DIAGNOSIS — R29.898 WEAKNESS OF BOTH ARMS: ICD-10-CM

## 2023-09-22 DIAGNOSIS — M25.521 BILATERAL ELBOW JOINT PAIN: ICD-10-CM

## 2023-09-22 DIAGNOSIS — M25.531 BILATERAL WRIST PAIN: Primary | ICD-10-CM

## 2023-09-22 DIAGNOSIS — M25.632 DECREASED RANGE OF MOTION OF BOTH WRISTS: ICD-10-CM

## 2023-09-22 PROCEDURE — 97530 THERAPEUTIC ACTIVITIES: CPT | Performed by: PHYSICAL THERAPIST

## 2023-09-22 PROCEDURE — 97112 NEUROMUSCULAR REEDUCATION: CPT | Performed by: PHYSICAL THERAPIST

## 2023-09-22 PROCEDURE — 97110 THERAPEUTIC EXERCISES: CPT | Performed by: PHYSICAL THERAPIST

## 2023-09-22 NOTE — PROGRESS NOTES
Physical Therapy Daily Treatment Note      Patient: Lissette Saavedra   : 1965  Referring practitioner: No ref. provider found  Date of Initial Visit: Type: THERAPY  Noted: 9/15/2023  Today's Date: 2023  Patient seen for 3 sessions           Visit Diagnoses:    ICD-10-CM ICD-9-CM   1. Bilateral wrist pain  M25.531 719.43    M25.532    2. Bilateral elbow joint pain  M25.521 719.42    M25.522    3. Complex regional pain syndrome type 1 of left upper extremity  G90.512 337.21   4. Decreased range of motion of both wrists  M25.631 719.53    M25.632    5. Weakness of both arms  R29.898 729.89       Subjective Evaluation    History of Present Illness  Mechanism of injury: R wrist pain - 1/10  R elbow pain - 0/10  L wrist pain - 0/10  L elbow pain - 0/10    She has been off work for two days due to illness.  She does have pain with tenderness/palpation.     She is going to [facility] for her weekly check up.     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          Objective   See Exercise, Manual, and Modality Logs for complete treatment.       Assessment & Plan       Assessment  Impairments: abnormal coordination, abnormal muscle firing, abnormal or restricted ROM, activity intolerance, impaired physical strength, lacks appropriate home exercise program and pain with function   Functional limitations: carrying objects, lifting, sleeping, pulling, pushing, uncomfortable because of pain, reaching behind back, reaching overhead and unable to perform repetitive tasks   Assessment details: Fifth digit on the R hand still has more soreness with activities and palpation, but the L wrist/forearm has more discomfort overall.  She tolerated all activities and strengthening today.  She noted that she felt hot at the end of the session today, had some water, felt like she was good to leave.      Goals  Plan Goals: 1. The patient has limited ROM of the B wrist  LTG 1: 12 weeks:  The patient will  demonstrate 80 degrees of flexion 70 degrees of extension, 30 degrees of ulnar deviation, 20 degrees of radial deviation, and 80 degrees of forearm pronation, and 80 degrees of forearm supination to allow the patient to perform ADLs.    STATUS:  Progressing   STG 1a: 4 weeks:  The patient will demonstrate 60 degrees of bilateral wrist flexion, 60 degrees of wrist extension, 25 degrees of wrist ulnar deviation, and 25 degrees of wrist radial deviation, and 80 degrees of forearm pronation, and 70 degrees of forearm supination.    STATUS:  Progressing    TREATMENT: Manual therapy, therapeutic exercise, home exercise instruction, and modalities as needed to include: electrical stimulation, ultrasound, moist heat, and ice.     2. The patient has limited strength of the B wrist.  LTG 2: 12 weeks:  The patient will demonstrate 4+/5 strength for B wrist flexion, extension, ulnar deviation, radial deviation and forearm pronation and supination in order to demonstrate improved wrist stability.    STATUS:  Progressing    STG 2a: 4 weeks:  The patient will demonstrate 4/5 strength for B wrist flexion, extension, ulnar deviation, radial deviation and forearm pronation and supination.    STATUS:  Progressing    STG2b:  4 weeks:  The patient will be independent with home exercises.     STATUS:  Progressing    TREATMENT: Manual therapy, therapeutic exercise, home exercise instruction, and modalities as needed to include: electrical stimulation, ultrasound, moist heat, and ice.    3. The patient complains of pain to the B wrist and elbow   LTG 3: 12 weeks:  The patient will report a pain rating of 2/10 or better in order to improve sleep quality and tolerance to performance of activities of daily living.    STATUS:  Progressing    STG 3a: 6 weeks:  The patient will report a pain rating of 4/10 or better.     STATUS:  Progressing    TREATMENT: Manual therapy, therapeutic exercise, home exercise instruction, and modalities as needed  to include: electrical stimulation, ultrasound, moist heat, and ice.      4. The patient has limited strength of the B elbow   LTG 4: 12 weeks:  The patient will demonstrate 5/5 strength for B elbow flexion, extension and forearm pronation, supination in order to demonstrate improved elbow stability.    STATUS:  Progressing    STG 2a: 4 weeks:  The patient will demonstrate 4+/5 strength for B elbow flexion, extension and forearm pronation, supination.    STATUS:  Progressing     TREATMENT: Manual therapy, therapeutic exercise, home exercise instruction, and modalities as needed to include: electrical stimulation, ultrasound, moist heat, and ice.       Plan  Therapy options: will be seen for skilled therapy services  Planned modality interventions: TENS, electrical stimulation/Romanian stimulation, ultrasound and dry needling  Planned therapy interventions: manual therapy, body mechanics training, ADL retraining, fine motor coordination training, flexibility, functional ROM exercises, home exercise program, IADL retraining, joint mobilization, motor coordination training, neuromuscular re-education, soft tissue mobilization, strengthening, stretching and therapeutic activities  Frequency: 2x week  Duration in weeks: 12  Treatment plan discussed with: patient  Plan details: Increase strength for the B wrist/elbow/arms, endurance, functional activities.         Progress per Plan of Care and Progress strengthening /stabilization /functional activity           Timed:  Manual Therapy:         mins  96408;  Therapeutic Exercise:    23     mins  93498;     Neuromuscular Monika:    10    mins  57477;    Therapeutic Activity:     8     mins  79892;     Gait Training:           mins  76360;     Ultrasound:          mins  88053;    Canalith Repos           ___  mins  75169      Untimed:  Electrical Stimulation:         mins  72221 ( );  Mechanical Traction:         mins  81451;   Dry Needling:                     mins self  pay       Timed Treatment:   41   mins   Total Treatment:     43   mins      Eliane Khalil PT, DPT  [place] License #: [id number]

## 2023-09-27 ENCOUNTER — TELEPHONE (OUTPATIENT)
Dept: PHYSICAL THERAPY | Facility: CLINIC | Age: 58
End: 2023-09-27
Payer: COMMERCIAL

## 2023-09-27 NOTE — TELEPHONE ENCOUNTER
Called pt and she states she is good and doesn't need to come back. Pt states she is being released from  on Friday.

## 2023-11-15 ENCOUNTER — CLINICAL SUPPORT (OUTPATIENT)
Dept: ORTHOPEDIC SURGERY | Facility: CLINIC | Age: 58
End: 2023-11-15
Payer: COMMERCIAL

## 2023-11-15 VITALS — WEIGHT: 232 LBS | BODY MASS INDEX: 36.41 KG/M2 | HEIGHT: 67 IN | TEMPERATURE: 98.6 F

## 2023-11-15 DIAGNOSIS — M17.11 PRIMARY OSTEOARTHRITIS OF RIGHT KNEE: ICD-10-CM

## 2023-11-15 DIAGNOSIS — M17.12 PRIMARY OSTEOARTHRITIS OF LEFT KNEE: Primary | ICD-10-CM

## 2023-11-15 RX ORDER — METHYLPREDNISOLONE ACETATE 80 MG/ML
160 INJECTION, SUSPENSION INTRA-ARTICULAR; INTRALESIONAL; INTRAMUSCULAR; SOFT TISSUE
Status: COMPLETED | OUTPATIENT
Start: 2023-11-15 | End: 2023-11-15

## 2023-11-15 RX ORDER — LIDOCAINE HYDROCHLORIDE 10 MG/ML
2 INJECTION, SOLUTION EPIDURAL; INFILTRATION; INTRACAUDAL; PERINEURAL
Status: COMPLETED | OUTPATIENT
Start: 2023-11-15 | End: 2023-11-15

## 2023-11-15 RX ADMIN — METHYLPREDNISOLONE ACETATE 160 MG: 80 INJECTION, SUSPENSION INTRA-ARTICULAR; INTRALESIONAL; INTRAMUSCULAR; SOFT TISSUE at 11:19

## 2023-11-15 RX ADMIN — LIDOCAINE HYDROCHLORIDE 2 ML: 10 INJECTION, SOLUTION EPIDURAL; INFILTRATION; INTRACAUDAL; PERINEURAL at 11:19

## 2023-11-15 NOTE — PROGRESS NOTES
"Chief Complaint  Follow-up and Pain of the Left Knee and Pain and Follow-up of the Right Knee    Subjective    History of Present Illness      Lissette Saavedra is a 58 y.o. female who presents to [facility] for injection therapy. She receives  BILATERAL knee injections of Depo-Medrol. Since last injection, patient notes great relief of symptoms. Treatment options have been discussed and she understands and consents.       Objective   Vital Signs:   Temp 98.6 °F (37 °C)   Ht 168.9 cm (66.5\")   Wt 105 kg (232 lb)   BMI 36.88 kg/m²     Physical Exam  58 y.o. female is awake, alert, oriented, in no acute distress and well developed, well nourished.  Ortho Exam   BILATERAL knee  There is mild joint line tenderness at the medial aspect of the knee.   Positive for varus orientation of the knee.   Positive for crepitus throughout range of motion.   Negative for effusion.  Positive patellar grind test.   Negative Lachman test.    Negative anterior and posterior drawer.  Range of motion in extension and flexion is: 0-110 degrees.  Neurovascular status is intact.    Dorsalis pedis and posterior tibial artery pulses are palpable.    Common peroneal nerve function is well preserved.  Gait is cautious and antalgic.        Result Review :        PROCEDURE  - Large Joint Arthrocentesis: bilateral knee on 11/15/2023 11:19 AM  Indications: pain  Details: 25 G needle, anteromedial (Right AM  Left AL) approach  Medications (Right): 2 mL lidocaine PF 1% 1 %; 160 mg methylPREDNISolone acetate 80 MG/ML  Medications (Left): 2 mL lidocaine PF 1% 1 %; 160 mg methylPREDNISolone acetate 80 MG/ML  Outcome: tolerated well, no immediate complications  Procedure, treatment alternatives, risks and benefits explained, specific risks discussed. Consent was given by the patient. Immediately prior to procedure a time out was called to verify the correct patient, procedure, equipment, support staff and site/side marked " as required. Patient was prepped and draped in the usual sterile fashion.              Injection site was identified by physical examination and cleaned with Betadine and alcohol swabs. Prior to needle insertion, ethyl chloride spray was used for surface anesthesia. Sterile technique was used.       Assessment   Assessment and Plan    Problem List Items Addressed This Visit          Musculoskeletal and Injuries    Primary osteoarthritis of left knee - Primary    Relevant Medications    Ibuprofen 3 %, Gabapentin 10 %, Baclofen 2 %, lidocaine 4 %    Other Relevant Orders    - Large Joint Arthrocentesis: bilateral knee    Primary osteoarthritis of right knee    Relevant Medications    Ibuprofen 3 %, Gabapentin 10 %, Baclofen 2 %, lidocaine 4 %    Other Relevant Orders    - Large Joint Arthrocentesis: bilateral knee       Follow Up   Bilateral knee Depo-Medrol injection was discussed with the patient. Discussed indication, risks, benefits, and alternatives. Verbal consent was given to proceed with the procedure. Patient tolerated the procedure well and no complications were encountered.  Discussion of orthopedic goals and activities and patient/guardian expressed understanding.  Ice, heat, rest, compression and elevation of extremity as beneficial  nsaids and/or tylenol as beneficial  Instructed to refrain from heavy activity/rest the extremity for the next 24-48 hours  Discussion regarding possibility of cortisol flare and what to expect if this occurs  Watch for signs and symptoms of infection  Call if adverse effect from injection therapy  Follow up in 3 months  Patient was given instructions and counseling regarding her condition or for health maintenance advice. Please see specific information pulled into the AVS if appropriate.     José Smith PA-C   Date of Encounter: 11/15/2023     Electronically signed by José Smith PA-C, 11/15/23, 11:20 AM EST.   EMR Dragon/Transcription disclaimer:  Much of  this encounter note is an electronic transcription/translation of spoken language to printed text. The electronic translation of spoken language may permit erroneous, or at times, nonsensical words or phrases to be inadvertently transcribed; Although I have reviewed the note for such errors, some may still exist.

## 2024-01-02 ENCOUNTER — OFFICE VISIT (OUTPATIENT)
Dept: FAMILY MEDICINE CLINIC | Age: 59
End: 2024-01-02
Payer: COMMERCIAL

## 2024-01-02 VITALS
DIASTOLIC BLOOD PRESSURE: 92 MMHG | SYSTOLIC BLOOD PRESSURE: 133 MMHG | BODY MASS INDEX: 35.66 KG/M2 | WEIGHT: 227.2 LBS | HEIGHT: 67 IN | TEMPERATURE: 98.4 F | OXYGEN SATURATION: 99 % | HEART RATE: 105 BPM

## 2024-01-02 DIAGNOSIS — R05.1 ACUTE COUGH: ICD-10-CM

## 2024-01-02 DIAGNOSIS — B34.9 ACUTE VIRAL SYNDROME: Primary | ICD-10-CM

## 2024-01-02 DIAGNOSIS — I10 PRIMARY HYPERTENSION: ICD-10-CM

## 2024-01-02 DIAGNOSIS — R11.0 NAUSEA: ICD-10-CM

## 2024-01-02 PROCEDURE — 87428 SARSCOV & INF VIR A&B AG IA: CPT | Performed by: NURSE PRACTITIONER

## 2024-01-02 PROCEDURE — 99213 OFFICE O/P EST LOW 20 MIN: CPT | Performed by: NURSE PRACTITIONER

## 2024-01-02 RX ORDER — ONDANSETRON 4 MG/1
4 TABLET, ORALLY DISINTEGRATING ORAL EVERY 8 HOURS PRN
Qty: 20 TABLET | Refills: 0 | Status: SHIPPED | OUTPATIENT
Start: 2024-01-02

## 2024-01-02 RX ORDER — BENZONATATE 200 MG/1
200 CAPSULE ORAL 3 TIMES DAILY PRN
Qty: 30 CAPSULE | Refills: 0 | Status: SHIPPED | OUTPATIENT
Start: 2024-01-02

## 2024-01-02 NOTE — LETTER
January 2, 2024     Patient: Lissette Saavedra   YOB: 1965   Date of Visit: 1/2/2024       To Whom It May Concern:    It is my medical opinion that Lissette Saavedra may remain out of work from 12/29/23 to 1/3/24.         Sincerely,        EDDA Salter    CC: No Recipients

## 2024-07-24 ENCOUNTER — OFFICE VISIT (OUTPATIENT)
Dept: FAMILY MEDICINE CLINIC | Age: 59
End: 2024-07-24
Payer: COMMERCIAL

## 2024-07-24 VITALS
TEMPERATURE: 97.8 F | DIASTOLIC BLOOD PRESSURE: 87 MMHG | SYSTOLIC BLOOD PRESSURE: 148 MMHG | BODY MASS INDEX: 36.29 KG/M2 | HEART RATE: 96 BPM | HEIGHT: 67 IN | WEIGHT: 231.2 LBS

## 2024-07-24 DIAGNOSIS — K21.9 GASTROESOPHAGEAL REFLUX DISEASE WITHOUT ESOPHAGITIS: ICD-10-CM

## 2024-07-24 DIAGNOSIS — R05.9 COUGH, UNSPECIFIED TYPE: Primary | ICD-10-CM

## 2024-07-24 PROCEDURE — 87428 SARSCOV & INF VIR A&B AG IA: CPT | Performed by: NURSE PRACTITIONER

## 2024-07-24 PROCEDURE — 99213 OFFICE O/P EST LOW 20 MIN: CPT | Performed by: NURSE PRACTITIONER

## 2024-07-24 RX ORDER — PANTOPRAZOLE SODIUM 40 MG/1
40 TABLET, DELAYED RELEASE ORAL DAILY
Qty: 30 TABLET | Refills: 2 | Status: SHIPPED | OUTPATIENT
Start: 2024-07-24

## 2024-07-24 NOTE — PROGRESS NOTES
Lissette Saavedra presents to [facility].    Chief Complaint:  Vomiting (Vomitting, slight cough, headache, diarrhea, body aches x 1 week. )         History of Present Illness:  URI  When did symptoms started: 5-7 days ago  Any exposures:works at [employer]   Symptoms: has Vomited, but has slight nausea now, dry slight cough, headache, diarrhea, body aches   Treatment tried:immodium helped diarrhea ; prilosec was helping but she ran out   In on singulair      PAST MEDICAL HISTORY changes:       History of covid pneumonia     Allergies    Hyperlipidemia    Hypertension         PREVENTIVE HEALTH MAINTENANCE             COLORECTAL CANCER SCREENING: Up to date (colonoscopy q10y; sigmoidoscopy q5y; Cologuard q3y) was last done 10-19-22 & 3/2019 -     DENTAL CLEANING: carlos [facility] / [facility]      PAP SMEAR: was last done 12/9/2020 with normal results The next one is due  12/2025 no previous abnormals               CURRENT MEDICAL PROVIDERS:    podiatry - Dr. Anders         Surgical History:       EGD/CLN 10-19-22    Cholecystectomy: laparoscopic; About 2008; Other Surgeries    uterine ablation;hysteroscopy    left forearm injury - skin graft in childhood;    bilateral foot surgery  (mortons neuroma left and plantar fasciitis right);         Family History:       Mother: CAD, thyroid disorder and OA  Father: CAD,   Alzheimer's Disease, T2DM           Social History:       Occupation: [employer]     Marital Status:      Children: 2 children and 1 step-child                    Review of Systems:  Review of Systems   Constitutional:  Negative for fever.   HENT:  Positive for ear pain (left ear). Negative for sore throat.    Respiratory:  Positive for cough. Negative for shortness of breath.    Cardiovascular:  Negative for chest pain.   Gastrointestinal:  Positive for diarrhea, nausea and vomiting.   Musculoskeletal:  Positive for arthralgias.   Neurological:  Positive for  "headache.          Current Outpatient Medications:     albuterol sulfate  (90 Base) MCG/ACT inhaler, Inhale 2 puffs Every 4 (Four) Hours As Needed for Wheezing., Disp: 6.7 g, Rfl: 1    benzonatate (TESSALON) 200 MG capsule, Take 1 capsule by mouth 3 (Three) Times a Day As Needed for Cough., Disp: 30 capsule, Rfl: 0    gabapentin (NEURONTIN) 300 MG capsule, Take 1 capsule by mouth 2 (Two) Times a Day., Disp: , Rfl:     hydrocortisone 1 % lotion, Apply  topically to the appropriate area as directed 2 (Two) Times a Day., Disp: 118 mL, Rfl: 0    Ibuprofen 3 %, Gabapentin 10 %, Baclofen 2 %, lidocaine 4 %, Apply 1-2 g topically to the appropriate area as directed 3 (Three) to 4 (Four) times daily., Disp: 90 g, Rfl: 1    montelukast (SINGULAIR) 10 MG tablet, Take 1 tablet by mouth Every Night., Disp: 90 tablet, Rfl: 3    ondansetron ODT (ZOFRAN-ODT) 4 MG disintegrating tablet, Place 1 tablet on the tongue Every 8 (Eight) Hours As Needed for Nausea or Vomiting., Disp: 20 tablet, Rfl: 0    pantoprazole (Protonix) 40 MG EC tablet, Take 1 tablet by mouth Daily., Disp: 30 tablet, Rfl: 2    probiotic (CULTURELLE) capsule capsule, Take 1 capsule by mouth Daily. (Patient not taking: Reported on 7/24/2024), Disp: , Rfl:     Vital Signs:   Vitals:    07/24/24 1251   BP: 148/87   BP Location: Right arm   Patient Position: Sitting   Cuff Size: Large Adult   Pulse: 96   Temp: 97.8 °F (36.6 °C)   TempSrc: Oral   Weight: 105 kg (231 lb 3.2 oz)   Height: 168.9 cm (66.5\")              Physical Exam:  Physical Exam  Vitals reviewed.   Constitutional:       General: She is not in acute distress.     Appearance: Normal appearance.   HENT:      Right Ear: Tympanic membrane, ear canal and external ear normal.      Left Ear: Tympanic membrane, ear canal and external ear normal.      Nose: No congestion (no sinus tenderness).      Mouth/Throat:      Pharynx: Oropharynx is clear. No posterior oropharyngeal erythema.   Cardiovascular:      " Rate and Rhythm: Normal rate and regular rhythm.      Heart sounds: Normal heart sounds. No murmur heard.  Pulmonary:      Effort: Pulmonary effort is normal. No respiratory distress.      Breath sounds: Normal breath sounds.   Abdominal:      Palpations: Abdomen is soft.      Tenderness: There is no abdominal tenderness.   Musculoskeletal:      Right lower leg: No edema.      Left lower leg: No edema.   Lymphadenopathy:      Cervical: No cervical adenopathy.   Neurological:      General: No focal deficit present.      Mental Status: She is alert.   Psychiatric:         Mood and Affect: Mood normal.         Behavior: Behavior normal.         Result Review      The following data was reviewed by: EDDA Rasheed on 07/24/2024:    Results for orders placed or performed in visit on 07/24/24   POCT SARS-CoV-2 Antigen MAUREEN + Flu    Specimen: Swab   Result Value Ref Range    SARS Antigen Not Detected Not Detected, Presumptive Negative    Influenza A Antigen MAUREEN Not Detected Not Detected    Influenza B Antigen MAUREEN Not Detected Not Detected    Internal Control Passed Passed    Lot Number [id number]     Expiration Date 11-2-24                Assessment and Plan:          Diagnoses and all orders for this visit:    1. Cough, unspecified type (Primary)  Assessment & Plan:  Rest, increase fluids, follow up if symptoms progress or change   Covid screen: neg  Flu screen: neg  Advised to try zyrtec, she can continue singulair      Orders:  -     POCT SARS-CoV-2 Antigen MAUREEN + Flu    2. Gastroesophageal reflux disease without esophagitis  Assessment & Plan:  Restart her PPI, clear liquid diet and advance diet as tolerated, considered labs today, she wants to wait and see if she improves, offered to cover work for tomorrow, she feels like she can work, if not improving, follow up    Orders:  -     pantoprazole (Protonix) 40 MG EC tablet; Take 1 tablet by mouth Daily.  Dispense: 30 tablet; Refill: 2                  Follow Up    Return if symptoms worsen or fail to improve.  Patient was given instructions and counseling regarding her condition or for health maintenance advice. Please see specific information pulled into the AVS if appropriate.

## 2024-07-24 NOTE — ASSESSMENT & PLAN NOTE
Restart her PPI, clear liquid diet and advance diet as tolerated, considered labs today, she wants to wait and see if she improves, offered to cover work for tomorrow, she feels like she can work, if not improving, follow up

## 2024-07-24 NOTE — ASSESSMENT & PLAN NOTE
Rest, increase fluids, follow up if symptoms progress or change   Covid screen: neg  Flu screen: neg  Advised to try zyrtec, she can continue singulair

## 2024-10-16 DIAGNOSIS — K21.9 GASTROESOPHAGEAL REFLUX DISEASE WITHOUT ESOPHAGITIS: ICD-10-CM

## 2024-10-16 NOTE — TELEPHONE ENCOUNTER
Rx Refill Note  Requested Prescriptions     Pending Prescriptions Disp Refills    pantoprazole (PROTONIX) 40 MG EC tablet [Pharmacy Med Name: Pantoprazole Sodium 40 MG Oral Tablet Delayed Release] 30 tablet 0     Sig: Take 1 tablet by mouth once daily      Last office visit with prescribing clinician: 7/24/2024   Last telemedicine visit with prescribing clinician: Visit date not found   Next office visit with prescribing clinician: Visit date not found  Proton Pump Inhibitors Protocol Failed    Blanca Silvestre LPN  10/16/24, 08:55 EDT

## 2024-10-19 RX ORDER — PANTOPRAZOLE SODIUM 40 MG/1
40 TABLET, DELAYED RELEASE ORAL DAILY
Qty: 30 TABLET | Refills: 0 | Status: SHIPPED | OUTPATIENT
Start: 2024-10-19

## 2024-10-30 ENCOUNTER — OFFICE VISIT (OUTPATIENT)
Dept: FAMILY MEDICINE CLINIC | Age: 59
End: 2024-10-30
Payer: COMMERCIAL

## 2024-10-30 VITALS
WEIGHT: 233 LBS | DIASTOLIC BLOOD PRESSURE: 85 MMHG | BODY MASS INDEX: 36.57 KG/M2 | SYSTOLIC BLOOD PRESSURE: 135 MMHG | TEMPERATURE: 98.2 F | HEIGHT: 67 IN | OXYGEN SATURATION: 96 % | HEART RATE: 78 BPM

## 2024-10-30 DIAGNOSIS — F43.29 PROLONGED GRIEF REACTION: Primary | ICD-10-CM

## 2024-10-30 DIAGNOSIS — K21.9 GASTROESOPHAGEAL REFLUX DISEASE WITHOUT ESOPHAGITIS: ICD-10-CM

## 2024-10-30 DIAGNOSIS — M19.90 ARTHRITIS: ICD-10-CM

## 2024-10-30 PROCEDURE — 99214 OFFICE O/P EST MOD 30 MIN: CPT | Performed by: NURSE PRACTITIONER

## 2024-10-30 RX ORDER — PANTOPRAZOLE SODIUM 40 MG/1
40 TABLET, DELAYED RELEASE ORAL DAILY
Qty: 90 TABLET | Refills: 1 | Status: SHIPPED | OUTPATIENT
Start: 2024-10-30

## 2024-10-30 RX ORDER — DULOXETIN HYDROCHLORIDE 20 MG/1
20 CAPSULE, DELAYED RELEASE ORAL DAILY
Qty: 30 CAPSULE | Refills: 1 | Status: SHIPPED | OUTPATIENT
Start: 2024-10-30

## 2024-12-04 ENCOUNTER — OFFICE VISIT (OUTPATIENT)
Dept: FAMILY MEDICINE CLINIC | Age: 59
End: 2024-12-04
Payer: COMMERCIAL

## 2024-12-04 ENCOUNTER — PRIOR AUTHORIZATION (OUTPATIENT)
Dept: FAMILY MEDICINE CLINIC | Age: 59
End: 2024-12-04

## 2024-12-04 VITALS
SYSTOLIC BLOOD PRESSURE: 146 MMHG | HEIGHT: 67 IN | HEART RATE: 86 BPM | BODY MASS INDEX: 36.66 KG/M2 | DIASTOLIC BLOOD PRESSURE: 92 MMHG | OXYGEN SATURATION: 96 % | TEMPERATURE: 98 F | WEIGHT: 233.6 LBS

## 2024-12-04 DIAGNOSIS — F43.29 PROLONGED GRIEF REACTION: Primary | ICD-10-CM

## 2024-12-04 PROCEDURE — 99213 OFFICE O/P EST LOW 20 MIN: CPT | Performed by: NURSE PRACTITIONER

## 2024-12-04 RX ORDER — DULOXETIN HYDROCHLORIDE 20 MG/1
20 CAPSULE, DELAYED RELEASE ORAL DAILY
Qty: 90 CAPSULE | Refills: 1 | Status: SHIPPED | OUTPATIENT
Start: 2024-12-04

## 2024-12-04 NOTE — TELEPHONE ENCOUNTER
PA for Pantoprazole denied  Message from plan -  This drug is covered if you meet the followin) If only OTC drugs are available, you have or cannot use at least three OTC drugs (if only one or two OTC drugs are available, you must have tried or cannot use all that are available). OTC drugs include: Prevacid OTC (lansoprazole), Prilosec OTC (omeprazole), and Nexium OTC (esomeprazole).

## 2024-12-06 NOTE — PROGRESS NOTES
"Chief Complaint  Lissette Saavedra presents to [facility] for Depression (1 month follow up )    Subjective     Depression  Her past medical history is significant for depression.     Lissette presents today for follow up on Depression and grief over the loss of her sister earlier this year.    She reports that She Is doing well on current treatment of Cymbalta  She denies current suicidal and homicidal ideation.    Current psychotherapy : No           Assessment and Plan     Diagnoses and all orders for this visit:    1. Prolonged grief reaction (Primary)    Other orders  -     DULoxetine (Cymbalta) 20 MG capsule; Take 1 capsule by mouth Daily.  Dispense: 90 capsule; Refill: 1            Follow Up   Return in about 6 months (around 6/4/2025) for Recheck, Annual physical.  No future appointments.    New Medications Ordered This Visit   Medications    DULoxetine (Cymbalta) 20 MG capsule     Sig: Take 1 capsule by mouth Daily.     Dispense:  90 capsule     Refill:  1       Medications Discontinued During This Encounter   Medication Reason    DULoxetine (Cymbalta) 20 MG capsule Reorder          Review of Systems    Objective     Vitals:    12/04/24 1443   BP: 146/92   BP Location: Left arm   Patient Position: Sitting   Cuff Size: Large Adult   Pulse: 86   Temp: 98 °F (36.7 °C)   TempSrc: Oral   SpO2: 96%   Weight: 106 kg (233 lb 9.6 oz)   Height: 168.9 cm (66.5\")     Class 2 Severe Obesity (BMI >=35 and <=39.9). Obesity-related health conditions include the following: hypertension. Obesity is unchanged. BMI is is above average; BMI management plan is completed. We discussed portion control and increasing exercise.      Physical Exam  Constitutional:       General: She is not in acute distress.     Appearance: Normal appearance.   HENT:      Head: Normocephalic.   Cardiovascular:      Rate and Rhythm: Normal rate and regular rhythm.   Pulmonary:      Effort: Pulmonary effort is normal.     "  Breath sounds: Normal breath sounds.   Musculoskeletal:         General: Normal range of motion.   Neurological:      General: No focal deficit present.      Mental Status: She is alert and oriented to person, place, and time.   Psychiatric:         Mood and Affect: Mood normal.         Behavior: Behavior normal.               Result Review          Allergies   Allergen Reactions    Melon Shortness Of Breath    Ace Inhibitors Cough    Aspirin Unknown - Low Severity    Salicylates Swelling      Past Medical History:   Diagnosis Date    Acute vaginitis     Allergic     Arthritis     Candidiasis of skin and nail     Diarrhea     Essential (primary) hypertension     Family history of diabetes mellitus     GERD (gastroesophageal reflux disease)     Hyperlipidemia     Low back pain     Menopausal and female climacteric states     Neuropathy     Other long term (current) drug therapy     Other specified polyneuropathies     Pain in left knee     Right wrist fracture     no surgery, unsure date    Varicose veins of left lower extremity with inflammation      Current Outpatient Medications   Medication Sig Dispense Refill    DULoxetine (Cymbalta) 20 MG capsule Take 1 capsule by mouth Daily. 90 capsule 1    gabapentin (NEURONTIN) 300 MG capsule Take 1 capsule by mouth Every Night.      hydrocortisone 1 % lotion Apply  topically to the appropriate area as directed 2 (Two) Times a Day. (Patient taking differently: Apply  topically to the appropriate area as directed 2 (Two) Times a Day As Needed.) 118 mL 0    pantoprazole (PROTONIX) 40 MG EC tablet Take 1 tablet by mouth Daily. 90 tablet 1     No current facility-administered medications for this visit.     Past Surgical History:   Procedure Laterality Date    COLONOSCOPY N/A 10/19/2022    Procedure: COLONOSCOPY;  Surgeon: Shukri Garcia MD;  Location: [facility];  Service: General;  Laterality: N/A;  INTERNAL HEMORRHOIDS    D & C HYSTEROSCOPY ENDOMETRIAL ABLATION       ENDOMETRIAL ABLATION      ENDOSCOPY N/A 10/19/2022    Procedure: ESOPHAGOGASTRODUODENOSCOPY WITH BX;  Surgeon: Shukri Garcia MD;  Location: [facility] ENDOSCOPY;  Service: General;  Laterality: N/A;  GASTRITIS    FOOT SURGERY Bilateral     FOREARM SURGERY Left     skin graft     LAPAROSCOPIC CHOLECYSTECTOMY      TONSILLECTOMY        Health Maintenance Due   Topic Date Due    ANNUAL PHYSICAL  11/25/2021    PAP SMEAR  12/09/2023    LIPID PANEL  03/07/2024    BMI FOLLOWUP  09/11/2024      Immunization History   Administered Date(s) Administered    Flu Vaccine Intradermal Quad 18-64YR 11/25/2020    Influenza recombinant 10/26/2024    Shingrix 05/06/2023, 06/22/2023    Td (TDVAX) 05/07/2003    Tdap 05/06/2023         Part of this note may be an electronic transcription/translation of spoken language to printed   text using the Dragon Dictation System.      EDDA Salter

## 2024-12-19 ENCOUNTER — HOSPITAL ENCOUNTER (OUTPATIENT)
Dept: GENERAL RADIOLOGY | Facility: HOSPITAL | Age: 59
Discharge: HOME | End: 2024-12-19
Admitting: NURSE PRACTITIONER
Payer: COMMERCIAL

## 2024-12-19 ENCOUNTER — OFFICE VISIT (OUTPATIENT)
Dept: FAMILY MEDICINE CLINIC | Age: 59
End: 2024-12-19
Payer: COMMERCIAL

## 2024-12-19 VITALS
HEIGHT: 67 IN | OXYGEN SATURATION: 97 % | HEART RATE: 82 BPM | SYSTOLIC BLOOD PRESSURE: 120 MMHG | TEMPERATURE: 98.1 F | BODY MASS INDEX: 37.14 KG/M2 | DIASTOLIC BLOOD PRESSURE: 81 MMHG

## 2024-12-19 DIAGNOSIS — M25.562 ACUTE PAIN OF LEFT KNEE: ICD-10-CM
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
## 2024-12-19 PROCEDURE — 73562 X-RAY EXAM OF KNEE 3: CPT

## 2024-12-19 RX ORDER — TRAMADOL HYDROCHLORIDE 50 MG/1
50 TABLET ORAL EVERY 6 HOURS PRN
Qty: 20 TABLET | Refills: 0 | Status: SHIPPED | OUTPATIENT
Start: 2024-12-19

## 2024-12-19 NOTE — PROGRESS NOTES
"Chief Complaint  Lissette Saavedra presents to [facility] for Fall (Knee pain (L) )    Subjective     History of Present Illness  Fell at [facility] tripping and landing on her left shoulder and bilateral knees (left knee is worse when bearing weight)  she has been using crutches .  She did go to ER but did not stay for the wait was too long     Assessment and Plan     Diagnoses and all orders for this visit:    1. Fall, initial encounter (Primary)  Comments:  xray  Orders:  -     XR Knee 3 View Left; Future  -     traMADol (ULTRAM) 50 MG tablet; Take 1 tablet by mouth Every 6 (Six) Hours As Needed for Moderate Pain.  Dispense: 20 tablet; Refill: 0    2. Acute pain of left knee  Comments:  pending xray - advise ice, compression sleeve and off for a few day  Orders:  -     XR Knee 3 View Left; Future  -     traMADol (ULTRAM) 50 MG tablet; Take 1 tablet by mouth Every 6 (Six) Hours As Needed for Moderate Pain.  Dispense: 20 tablet; Refill: 0            Follow Up   Return if symptoms worsen or fail to improve.  No future appointments.    New Medications Ordered This Visit   Medications    traMADol (ULTRAM) 50 MG tablet     Sig: Take 1 tablet by mouth Every 6 (Six) Hours As Needed for Moderate Pain.     Dispense:  20 tablet     Refill:  0       There are no discontinued medications.       Review of Systems    Objective     Vitals:    12/19/24 1251   BP: 120/81   BP Location: Left arm   Patient Position: Sitting   Cuff Size: Large Adult   Pulse: 82   Temp: 98.1 °F (36.7 °C)   TempSrc: Oral   SpO2: 97%   Height: 168.9 cm (66.5\")            Physical Exam  Constitutional:       Appearance: Normal appearance.   HENT:      Head: Normocephalic.   Musculoskeletal:      Cervical back: Normal range of motion.      Left knee: Bony tenderness present. Decreased range of motion. Tenderness present over the medial joint line and lateral joint line.   Neurological:      General: No focal " deficit present.      Mental Status: She is alert and oriented to person, place, and time.   Psychiatric:         Mood and Affect: Mood normal.         Thought Content: Thought content normal.               Result Review          Allergies   Allergen Reactions    Melon Shortness Of Breath    Ace Inhibitors Cough    Aspirin Unknown - Low Severity    Salicylates Swelling      Past Medical History:   Diagnosis Date    Acute vaginitis     Allergic     Arthritis     Candidiasis of skin and nail     Diarrhea     Essential (primary) hypertension     Family history of diabetes mellitus     GERD (gastroesophageal reflux disease)     Hyperlipidemia     Low back pain     Menopausal and female climacteric states     Neuropathy     Other long term (current) drug therapy     Other specified polyneuropathies     Pain in left knee     Right wrist fracture     no surgery, unsure date    Varicose veins of left lower extremity with inflammation      Current Outpatient Medications   Medication Sig Dispense Refill    DULoxetine (Cymbalta) 20 MG capsule Take 1 capsule by mouth Daily. 90 capsule 1    gabapentin (NEURONTIN) 300 MG capsule Take 1 capsule by mouth Every Night.      hydrocortisone 1 % lotion Apply  topically to the appropriate area as directed 2 (Two) Times a Day. (Patient taking differently: Apply  topically to the appropriate area as directed 2 (Two) Times a Day As Needed.) 118 mL 0    pantoprazole (PROTONIX) 40 MG EC tablet Take 1 tablet by mouth Daily. 90 tablet 1    traMADol (ULTRAM) 50 MG tablet Take 1 tablet by mouth Every 6 (Six) Hours As Needed for Moderate Pain. 20 tablet 0     No current facility-administered medications for this visit.     Past Surgical History:   Procedure Laterality Date    COLONOSCOPY N/A 10/19/2022    Procedure: COLONOSCOPY;  Surgeon: Shukri Garcia MD;  Location: [facility] ENDOSCOPY;  Service: General;  Laterality: N/A;  INTERNAL HEMORRHOIDS    D & C HYSTEROSCOPY ENDOMETRIAL ABLATION       ENDOMETRIAL ABLATION      ENDOSCOPY N/A 10/19/2022    Procedure: ESOPHAGOGASTRODUODENOSCOPY WITH BX;  Surgeon: Shukri Garcia MD;  Location: [facility] END[facility];  Service: General;  Laterality: N/A;  GASTRITIS    FOOT SURGERY Bilateral     FOREARM SURGERY Left     skin graft     LAPAROSCOPIC CHOLECYSTECTOMY      TONSILLECTOMY        Health Maintenance Due   Topic Date Due    ANNUAL PHYSICAL  11/25/2021    PAP SMEAR  12/09/2023    LIPID PANEL  03/07/2024      Immunization History   Administered Date(s) Administered    Flu Vaccine Intradermal Quad 18-64YR 11/25/2020    Influenza recombinant 10/26/2024    Shingrix 05/06/2023, 06/22/2023    Td (TDVAX) 05/07/2003    Tdap 05/06/2023         Part of this note may be an electronic transcription/translation of spoken language to printed   text using the Dragon Dictation System.      EDDA Salter

## 2024-12-20 ENCOUNTER — TELEPHONE (OUTPATIENT)
Dept: FAMILY MEDICINE CLINIC | Age: 59
End: 2024-12-20
Payer: COMMERCIAL

## 2024-12-20 NOTE — TELEPHONE ENCOUNTER
Caller: Lissette Saavedra    Relationship to patient: Self    Best call back number: [phone]     Patient is needing: PATIENT WOULD LIKE A CALLBACK TO DISCUSS RESULTS FROM IMAGING DONE ON 12.19.2024.        CONTACT PATIENT TO ADVISE.

## 2024-12-31 ENCOUNTER — TRANSCRIBE ORDERS (OUTPATIENT)
Dept: FAMILY MEDICINE CLINIC | Age: 59
End: 2024-12-31
Payer: COMMERCIAL

## 2024-12-31 DIAGNOSIS — M25.562 ACUTE PAIN OF LEFT KNEE: Primary | ICD-10-CM
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
## 2024-12-31 DIAGNOSIS — M17.12 PRIMARY OSTEOARTHRITIS OF LEFT KNEE: ICD-10-CM

## 2025-01-03 ENCOUNTER — TELEPHONE (OUTPATIENT)
Dept: FAMILY MEDICINE CLINIC | Age: 60
End: 2025-01-03
Payer: COMMERCIAL

## 2025-01-03 NOTE — TELEPHONE ENCOUNTER
PT DROPPED OFF INCOMPLETE FMLA FORMS AND PAID FEE. FORMS HAVE BEEN SCANNED AND PLACED IN ABBIE'S BOX    MRB 1/3/25

## 2025-01-03 NOTE — TELEPHONE ENCOUNTER
Patient requesting continuous FMLA 12/19/24-1/2/25 due to fall. See 12/20/24 telephone encounter. Okay to fill out?

## 2025-01-09 ENCOUNTER — TRANSCRIBE ORDERS (OUTPATIENT)
Dept: PHYSICAL THERAPY | Facility: CLINIC | Age: 60
End: 2025-01-09
Payer: COMMERCIAL

## 2025-01-09 ENCOUNTER — TREATMENT (OUTPATIENT)
Dept: PHYSICAL THERAPY | Facility: CLINIC | Age: 60
End: 2025-01-09
Payer: COMMERCIAL

## 2025-01-09 DIAGNOSIS — M25.562 LEFT KNEE PAIN, UNSPECIFIED CHRONICITY: Primary | ICD-10-CM

## 2025-01-09 DIAGNOSIS — M25.662 DECREASED RANGE OF MOTION (ROM) OF LEFT KNEE: ICD-10-CM

## 2025-01-09 DIAGNOSIS — R29.898 WEAKNESS OF LEFT LEG: ICD-10-CM

## 2025-01-09 DIAGNOSIS — M25.562 ACUTE PAIN OF LEFT KNEE: Primary | ICD-10-CM

## 2025-01-09 NOTE — PROGRESS NOTES
Physical Therapy Initial Evaluation and Plan of Care  [facility], [address], [place] [address]      Patient: Lissette Saavedra   : 1965  Diagnosis/ICD-10 Code:  Acute pain of left knee [M25.562]  Referring practitioner: AMADOR Barajas*  Date of Initial Visit: 2025  Today's Date: 2025  Patient seen for 1 sessions         Visit Diagnoses:    ICD-10-CM ICD-9-CM   1. Acute pain of left knee  M25.562 719.46   2. Weakness of left leg  R29.898 729.89   3. Decreased range of motion (ROM) of left knee  M25.662 719.56         Subjective Evaluation    History of Present Illness  Mechanism of injury: Lissette comes to OPPT with complaints of L knee pain.    24, She was at a [employer], tripped, and landed on her L shoulder against the wall, L knee, and chipped a tooth.  The next day, she attempted to go to the urgent care, but no one could do x-rays/read them.  She had crutches at home, so she was already using those.  She then tried to go to the ER, but left because the wait was too long.  She went to her PCP office two days after the fall. She also had x-rays, results show degenerative changes, worse in the medial and patellofemoral compartments. Moderate to large suprapatellar effusion, increased from prior.     She has worked this week with just half days after seeing the PCP.  Next week she will go back to full days (8hrs).  She always has a cart to lean on during her shift.    She is no longer using crutches or a cane.     The L knee pain is worse with WB. She cannot sit on her knee with her legs folded back.  She has trouble getting into the car.      She saw ortho yesterday, referred for OPPT.  The ortho wants her to get a knee brace, patient wondering around taping it.        Patient Occupation: [employer]  Current pain ratin (sitting EOB)  At worst pain ratin (walking)  Quality: throbbing  Relieving factors: ice  Aggravating factors: standing, stairs,  "ambulation and prolonged positioning    Social Support  Lives in: one-story house (2 steps)  Lives with: spouse    Hand dominance: right    Patient Goals  Patient goals for therapy: decreased edema, decreased pain, improved balance, increased motion, increased strength, independence with ADLs/IADLs, return to sport/leisure activities and return to work  Patient goal: \"less pain\"           Objective           General Comments     Knee Comments  L knee AROM  Flexion: 105 degrees  Extension: -5 degrees    L knee MMT  Flexion: 4-/5  Extension: 3+/5    L hip MMT  Flexion: 4-/5  Abduction: 4-/5  Adduction: 4/5    WB Status: WBAT and patient is not using an assistive device.  Decreased WB through the LLE. Mild limp noted.     Swelling: around the L knee, medial joint line    Radicular Symptoms: none noted    Tenderness: medial joint line, patella, quad, medial hamstrings         Assessment & Plan       Assessment  Impairments: abnormal gait, abnormal or restricted ROM, activity intolerance, impaired balance, impaired physical strength, lacks appropriate home exercise program, pain with function, safety issue and weight-bearing intolerance   Functional limitations: sleeping, walking, uncomfortable because of pain, sitting, standing, stooping and unable to perform repetitive tasks   Assessment details: Pt presents with limitations, noted below, that impede her ability to stand long periods of time, walking long distances, stairs, squatting, heavy lifting. The skills of a therapist will be required to safely and effectively implement the following treatment plan to restore maximal level of function.        Goals  Plan Goals: KNEE PROBLEMS:     1. The patient has limited ROM of the left knee.   LTG 1: 12 weeks:  The patient will demonstrate 0 to 120 degrees of ROM for the left knee in order to allow patient to perform ADLs and navigate stairs.    STATUS:  New   STG 1a: 4 weeks:  The patient will demonstrate -2 to 110 degrees " of ROM for the left knee.    STATUS:  New   TREATMENT: Manual therapy, therapeutic exercise, home exercise instruction, and modalities as needed to include:  moist heat, electrical stimulation, ultrasound, and ice.    2. The patient has limited strength of the left knee.   LTG 2: 12 weeks: The patient will demonstrate 4+ /5 strength for left knee flexion and extension in order to allow patient improved joint stability    STATUS:  New   STG 2a: 4 weeks: The patient will demonstrate 4 /5 strength for left knee flexion and extension    STATUS:  New   STG2b:  4 weeks:  The patient will be independent with home exercises.     STATUS:  New   TREATMENT: Manual therapy, therapeutic exercise, home exercise instruction, aquatic therapy, and modalities as needed to include:  moist heat, electrical stimulation, ultrasound, and ice.     3. The patient has gait dysfunction.   LTG 3: 12 weeks:  The patient will ambulate without assistive device, independently, for community distances with minimal limp to the left lower extremity in order to improve mobility and allow patient to perform activities such as grocery shopping with greater ease.    STATUS:  New   TREATMENT: Gait training, aquatic therapy, therapeutic exercise, and home exercise instruction.    4. Mobility: Walking/Moving Around Functional Limitation     LTG 4: 12 weeks:  The patient will demonstrate decreased limitation by achieving a score of 60/80 on the LEFS.    STATUS:  New   STG 4 a: 4 weeks:  The patient will demonstrate decreased limitation by achieving a score of 50/80 on the LEFS.      STATUS:  New   TREATMENT:  Manual therapy, therapeutic exercise, home exercise instruction, and modalities as needed.      Plan  Therapy options: will be seen for skilled therapy services  Planned modality interventions: cryotherapy, dry needling, electrical stimulation/Russian stimulation, TENS and thermotherapy (hydrocollator packs)  Planned therapy interventions: ADL  retraining, balance/weight-bearing training, body mechanics training, flexibility, functional ROM exercises, gait training, home exercise program, IADL retraining, joint mobilization, manual therapy, neuromuscular re-education, postural training, soft tissue mobilization, strengthening, stretching and therapeutic activities  Frequency: 1x week  Duration in weeks: 12  Treatment plan discussed with: patient  Plan details: Review HEP, update as needed.  KT as needed.     Progress with L knee pain, B knee and LE strength, trunk control/postural awareness, balance and gait training, coordination, increased stamina, decreased tightness, improved ROM/flexibility, education as needed.    She also has an order for a donjoy brace.  She is going to try and go to [facility] for that.  Taping used today, felt better immediately following tape.  She may try that over the weekend, then possible get brace on Friday.             History # of Personal Factors and/or Comorbidities: MODERATE (1-2)  Examination of Body System(s): # of elements: LOW (1-2)  Clinical Presentation: STABLE   Clinical Decision Making: LOW     Timed:  Manual Therapy:    8     mins  35838;  Therapeutic Exercise:    10     mins  36845;     Neuromuscular Monika:       mins  90256;    Therapeutic Activity:          mins  49802;     Gait Training:           mins  33561;     Ultrasound:                          mins  59669;  Self Care:           mins  18639          Un-timed:  Electrical Stimulation:         mins  07627 ( );  Dry Needling          mins self-pay;  Mechanical Traction           mins  85580;  Low Eval     20     mins 97289;  Moderate Eval          mins 04607;  High Eval          mins 54651;  Re-Eval          mins 69148;  Canalith Repos         mins 14434      Timed Treatment:   18   mins   Total Treatment:     38   mins        PT SIGNATURE: Eliane Khalil PT, DPT  [place] License: [id number]  Electronically signed by Eliane Khalil PT, 01/09/25, 12:55 PM  EST      Initial Certification    Certification Period: 1/9/2025 thru 4/8/2025  I certify that the therapy services are furnished while this patient is under my care.  The services outlined above are required by this patient, and will be reviewed every 90 days.     PHYSICIAN: José Smith PA-C  NPI: [id number]            PHYSICIAN PRINT NAME: ______________________________________________      PHYSICIAN SIGNATURE: ______________________________________________         DATE:________________________________        Please sign and return via fax to [phone].  Thank you, [facility].

## 2025-01-22 ENCOUNTER — TELEPHONE (OUTPATIENT)
Dept: ORTHOPEDICS | Facility: OTHER | Age: 60
End: 2025-01-22
Payer: COMMERCIAL

## 2025-05-30 DIAGNOSIS — K21.9 GASTROESOPHAGEAL REFLUX DISEASE WITHOUT ESOPHAGITIS: ICD-10-CM

## 2025-06-03 RX ORDER — PANTOPRAZOLE SODIUM 40 MG/1
40 TABLET, DELAYED RELEASE ORAL DAILY
Qty: 90 TABLET | Refills: 0 | Status: SHIPPED | OUTPATIENT
Start: 2025-06-03

## 2025-06-23 ENCOUNTER — TELEPHONE (OUTPATIENT)
Dept: FAMILY MEDICINE CLINIC | Age: 60
End: 2025-06-23
Payer: COMMERCIAL

## 2025-06-23 NOTE — TELEPHONE ENCOUNTER
It was sent to [facility] on 6/10/25. They will need to call [facility] directly to get an update on it. [phone]

## 2025-06-23 NOTE — TELEPHONE ENCOUNTER
Caller: [facility]    Relationship to patient:     Best call back number: [phone]    Patient is needing: REPRESENTATIVE WITH [facility]ICAL RECORDS CALLED WANTING TO KNOW IF THE OFFICE HAD RECEIVED THE FAX THEY SENT REQUESTING RECORDS FOR THE PATIENT. CALLER STATES THEY NEEDED THIS BACK BY 6.12.25 BUT HAVE NEVER RECEIVED THE PAPERWORK BACK. CALLER STATES BEST FAX NUMBER .[phone]

## 2025-06-25 RX ORDER — DULOXETIN HYDROCHLORIDE 20 MG/1
20 CAPSULE, DELAYED RELEASE ORAL DAILY
Qty: 90 CAPSULE | Refills: 0 | Status: SHIPPED | OUTPATIENT
Start: 2025-06-25

## 2025-07-10 ENCOUNTER — OFFICE VISIT (OUTPATIENT)
Dept: FAMILY MEDICINE CLINIC | Age: 60
End: 2025-07-10
Payer: COMMERCIAL

## 2025-07-10 VITALS
OXYGEN SATURATION: 99 % | BODY MASS INDEX: 34.36 KG/M2 | WEIGHT: 213.8 LBS | HEIGHT: 66 IN | SYSTOLIC BLOOD PRESSURE: 134 MMHG | HEART RATE: 87 BPM | TEMPERATURE: 97.4 F | DIASTOLIC BLOOD PRESSURE: 97 MMHG

## 2025-07-10 DIAGNOSIS — K21.9 GASTROESOPHAGEAL REFLUX DISEASE WITHOUT ESOPHAGITIS: ICD-10-CM

## 2025-07-10 DIAGNOSIS — R05.1 ACUTE COUGH: Primary | ICD-10-CM

## 2025-07-10 DIAGNOSIS — F43.29 PROLONGED GRIEF REACTION: ICD-10-CM

## 2025-07-10 PROCEDURE — 99214 OFFICE O/P EST MOD 30 MIN: CPT | Performed by: NURSE PRACTITIONER

## 2025-07-10 RX ORDER — BENZONATATE 200 MG/1
200 CAPSULE ORAL 3 TIMES DAILY PRN
Qty: 30 CAPSULE | Refills: 0 | Status: SHIPPED | OUTPATIENT
Start: 2025-07-10

## 2025-07-10 RX ORDER — ALBUTEROL SULFATE 90 UG/1
2 INHALANT RESPIRATORY (INHALATION) EVERY 4 HOURS PRN
Qty: 18 G | Refills: 1 | Status: SHIPPED | OUTPATIENT
Start: 2025-07-10

## 2025-07-17 ENCOUNTER — TRANSCRIBE ORDERS (OUTPATIENT)
Dept: GENERAL RADIOLOGY | Facility: HOSPITAL | Age: 60
End: 2025-07-17
Payer: COMMERCIAL

## 2025-07-17 ENCOUNTER — HOSPITAL ENCOUNTER (OUTPATIENT)
Dept: GENERAL RADIOLOGY | Facility: HOSPITAL | Age: 60
Discharge: HOME | End: 2025-07-17
Admitting: ORTHOPAEDIC SURGERY
Payer: COMMERCIAL

## 2025-07-17 DIAGNOSIS — M17.12 ARTHRITIS OF LEFT KNEE: Primary | ICD-10-CM

## 2025-07-17 DIAGNOSIS — M17.12 ARTHRITIS OF LEFT KNEE: ICD-10-CM

## 2025-07-17 PROCEDURE — 73564 X-RAY EXAM KNEE 4 OR MORE: CPT

## 2025-07-21 PROBLEM — M25.562 CHRONIC PAIN OF LEFT KNEE: Status: ACTIVE | Noted: 2025-01-07

## 2025-07-21 PROBLEM — G89.29 CHRONIC PAIN OF LEFT KNEE: Status: ACTIVE | Noted: 2025-01-07

## 2025-07-21 RX ORDER — DULOXETIN HYDROCHLORIDE 20 MG/1
20 CAPSULE, DELAYED RELEASE ORAL DAILY
Qty: 90 CAPSULE | Refills: 1 | Status: SHIPPED | OUTPATIENT
Start: 2025-07-21

## 2025-07-21 RX ORDER — PANTOPRAZOLE SODIUM 40 MG/1
40 TABLET, DELAYED RELEASE ORAL DAILY
Qty: 90 TABLET | Refills: 1 | Status: SHIPPED | OUTPATIENT
Start: 2025-07-21

## 2025-07-21 NOTE — PROGRESS NOTES
Chief Complaint  Lissette Saavedra presents to [facility] for Hypertension, Depression, and Med Refill (6 mo f/u )    Subjective     Hypertension  Depression    Nighttime awakenings:     PMH Includes: depression      Lissette is in today to follow-up on chronic conditions.  She has a history of depression and anxiety for which she is taking duloxetine 20 mg daily.  Since the death of her sister she struggled for quite some time however she reports with time this has improved although she still has moments as expected.    She continues to take Protonix for acid reflux.  We discussed today the need for her to possibly reduce this medication over time.    She also comes in today with a cough.  She reports that she is having a hard time getting rid of the congestion in her chest.  She has been taking Mucinex over-the-counter and needs a refill on her inhaler.    She will be having surgery at [facility] for her knee.  This is scheduled through her Workmen's Comp. at [facility] which is recommended that she follow-up with this office.  They are planning on a surgery however she has to have quite a bit of free workup completed.  She has worked on weight loss and has lost 23 pounds since October.  She is doing this through lifestyle modifications    Assessment and Plan     Diagnoses and all orders for this visit:    1. Acute cough (Primary)  -     benzonatate (TESSALON) 200 MG capsule; Take 1 capsule by mouth 3 (Three) Times a Day As Needed for Cough.  Dispense: 30 capsule; Refill: 0  -     albuterol sulfate  (90 Base) MCG/ACT inhaler; Inhale 2 puffs Every 4 (Four) Hours As Needed for Wheezing.  Dispense: 18 g; Refill: 1    2. Gastroesophageal reflux disease without esophagitis  -     pantoprazole (PROTONIX) 40 MG EC tablet; Take 1 tablet by mouth Daily.  Dispense: 90 tablet; Refill: 1    3. Prolonged grief reaction  -     DULoxetine (CYMBALTA) 20 MG capsule; Take 1 capsule by mouth  "Daily.  Dispense: 90 capsule; Refill: 1            Follow Up   Return in about 6 months (around 1/10/2026) for Recheck with new provider.  Future Appointments   Date Time Provider Department Center   10/15/2025  1:00 PM Fina Araujo APRN [facility]       New Medications Ordered This Visit   Medications    benzonatate (TESSALON) 200 MG capsule     Sig: Take 1 capsule by mouth 3 (Three) Times a Day As Needed for Cough.     Dispense:  30 capsule     Refill:  0    albuterol sulfate  (90 Base) MCG/ACT inhaler     Sig: Inhale 2 puffs Every 4 (Four) Hours As Needed for Wheezing.     Dispense:  18 g     Refill:  1    DULoxetine (CYMBALTA) 20 MG capsule     Sig: Take 1 capsule by mouth Daily.     Dispense:  90 capsule     Refill:  1    pantoprazole (PROTONIX) 40 MG EC tablet     Sig: Take 1 tablet by mouth Daily.     Dispense:  90 tablet     Refill:  1       Medications Discontinued During This Encounter   Medication Reason    traMADol (ULTRAM) 50 MG tablet *Therapy completed    pantoprazole (PROTONIX) 40 MG EC tablet Reorder    DULoxetine (CYMBALTA) 20 MG capsule Reorder          Review of Systems    Objective     Vitals:    07/10/25 1440   BP: 134/97   BP Location: Left arm   Patient Position: Sitting   Cuff Size: Adult   Pulse: 87   Temp: 97.4 °F (36.3 °C)   TempSrc: Temporal   SpO2: 99%   Weight: 97 kg (213 lb 12.8 oz)   Height: 168.9 cm (66.5\")         Physical Exam  Constitutional:       General: She is not in acute distress.     Appearance: Normal appearance. She is obese.   HENT:      Head: Normocephalic.   Cardiovascular:      Rate and Rhythm: Normal rate and regular rhythm.   Pulmonary:      Effort: Pulmonary effort is normal.      Breath sounds: Normal breath sounds.   Musculoskeletal:         General: Normal range of motion.   Neurological:      General: No focal deficit present.      Mental Status: She is alert and oriented to person, place, and time.   Psychiatric:         Mood and Affect: Mood " normal.         Behavior: Behavior normal.               Result Review          Allergies   Allergen Reactions    Melon Shortness Of Breath    Ace Inhibitors Cough    Aspirin Unknown - Low Severity    Salicylates Swelling      Past Medical History:   Diagnosis Date    Acute vaginitis     Allergic     Arthritis     Candidiasis of skin and nail     Diarrhea     Essential (primary) hypertension     Family history of diabetes mellitus     GERD (gastroesophageal reflux disease)     Hyperlipidemia     Low back pain     Menopausal and female climacteric states     Neuropathy     Other long term (current) drug therapy     Other specified polyneuropathies     Pain in left knee     Right wrist fracture     no surgery, unsure date    Varicose veins of left lower extremity with inflammation      Current Outpatient Medications   Medication Sig Dispense Refill    DULoxetine (CYMBALTA) 20 MG capsule Take 1 capsule by mouth Daily. 90 capsule 1    gabapentin (NEURONTIN) 300 MG capsule Take 1 capsule by mouth Every Night.      hydrocortisone 1 % lotion Apply  topically to the appropriate area as directed 2 (Two) Times a Day. (Patient taking differently: Apply  topically to the appropriate area as directed 2 (Two) Times a Day As Needed.) 118 mL 0    pantoprazole (PROTONIX) 40 MG EC tablet Take 1 tablet by mouth Daily. 90 tablet 1    albuterol sulfate  (90 Base) MCG/ACT inhaler Inhale 2 puffs Every 4 (Four) Hours As Needed for Wheezing. 18 g 1    benzonatate (TESSALON) 200 MG capsule Take 1 capsule by mouth 3 (Three) Times a Day As Needed for Cough. 30 capsule 0     No current facility-administered medications for this visit.     Past Surgical History:   Procedure Laterality Date    COLONOSCOPY N/A 10/19/2022    Procedure: COLONOSCOPY;  Surgeon: Shukri Garcia MD;  Location: [facility] ENDOSCOPY;  Service: General;  Laterality: N/A;  INTERNAL HEMORRHOIDS    D & C HYSTEROSCOPY ENDOMETRIAL ABLATION      ENDOMETRIAL ABLATION       ENDOSCOPY N/A 10/19/2022    Procedure: ESOPHAGOGASTRODUODENOSCOPY WITH BX;  Surgeon: Shukri Garcia MD;  Location: [facility] ENDOSCOPY;  Service: General;  Laterality: N/A;  GASTRITIS    FOOT SURGERY Bilateral     FOREARM SURGERY Left     skin graft     LAPAROSCOPIC CHOLECYSTECTOMY      TONSILLECTOMY        Health Maintenance Due   Topic Date Due    Pneumococcal Vaccine 50+ (1 of 1 - PCV) Never done    ANNUAL PHYSICAL  11/25/2021    PAP SMEAR  12/09/2023    MAMMOGRAM  05/17/2025      Immunization History   Administered Date(s) Administered    Flu Vaccine Intradermal Quad 18-64YR 11/25/2020    Influenza recombinant 10/26/2024    Shingrix 05/06/2023, 06/22/2023    Td (TDVAX) 05/07/2003    Tdap 05/06/2023         Part of this note may be an electronic transcription/translation of spoken language to printed   text using the Dragon Dictation System.      Mattie Ontiveros, APRN

## 2025-07-30 ENCOUNTER — LAB (OUTPATIENT)
Dept: LAB | Facility: HOSPITAL | Age: 60
End: 2025-07-30
Payer: COMMERCIAL

## 2025-07-30 ENCOUNTER — TRANSCRIBE ORDERS (OUTPATIENT)
Dept: ADMINISTRATIVE | Facility: HOSPITAL | Age: 60
End: 2025-07-30
Payer: COMMERCIAL

## 2025-07-30 DIAGNOSIS — Z01.818 PREOP EXAMINATION: ICD-10-CM

## 2025-07-30 DIAGNOSIS — Z01.818 PREOP EXAMINATION: Primary | ICD-10-CM

## 2025-07-30 LAB
ALBUMIN SERPL-MCNC: 3.9 G/DL (ref 3.5–5.2)
ALBUMIN/GLOB SERPL: 1.3 G/DL
ALP SERPL-CCNC: 72 U/L (ref 39–117)
ALT SERPL W P-5'-P-CCNC: 18 U/L (ref 1–33)
ANION GAP SERPL CALCULATED.3IONS-SCNC: 9 MMOL/L (ref 5–15)
AST SERPL-CCNC: 27 U/L (ref 1–32)
BASOPHILS # BLD AUTO: 0.11 10*3/MM3 (ref 0–0.2)
BASOPHILS NFR BLD AUTO: 1.6 % (ref 0–1.5)
BILIRUB SERPL-MCNC: 1.1 MG/DL (ref 0–1.2)
BUN SERPL-MCNC: 12 MG/DL (ref 8–23)
BUN/CREAT SERPL: 15.6 (ref 7–25)
CALCIUM SPEC-SCNC: 9.6 MG/DL (ref 8.6–10.5)
CHLORIDE SERPL-SCNC: 107 MMOL/L (ref 98–107)
CO2 SERPL-SCNC: 25 MMOL/L (ref 22–29)
CREAT SERPL-MCNC: 0.77 MG/DL (ref 0.57–1)
DEPRECATED RDW RBC AUTO: 43.8 FL (ref 37–54)
EGFRCR SERPLBLD CKD-EPI 2021: 87.9 ML/MIN/1.73
EOSINOPHIL # BLD AUTO: 0.24 10*3/MM3 (ref 0–0.4)
EOSINOPHIL NFR BLD AUTO: 3.5 % (ref 0.3–6.2)
ERYTHROCYTE [DISTWIDTH] IN BLOOD BY AUTOMATED COUNT: 13.1 % (ref 12.3–15.4)
GLOBULIN UR ELPH-MCNC: 3 GM/DL
GLUCOSE SERPL-MCNC: 90 MG/DL (ref 65–99)
HBA1C MFR BLD: 5.3 % (ref 4.8–5.6)
HCT VFR BLD AUTO: 46.2 % (ref 34–46.6)
HGB BLD-MCNC: 15 G/DL (ref 12–15.9)
IMM GRANULOCYTES # BLD AUTO: 0.01 10*3/MM3 (ref 0–0.05)
IMM GRANULOCYTES NFR BLD AUTO: 0.1 % (ref 0–0.5)
LYMPHOCYTES # BLD AUTO: 2.94 10*3/MM3 (ref 0.7–3.1)
LYMPHOCYTES NFR BLD AUTO: 42.5 % (ref 19.6–45.3)
MCH RBC QN AUTO: 29.5 PG (ref 26.6–33)
MCHC RBC AUTO-ENTMCNC: 32.5 G/DL (ref 31.5–35.7)
MCV RBC AUTO: 90.8 FL (ref 79–97)
MONOCYTES # BLD AUTO: 0.5 10*3/MM3 (ref 0.1–0.9)
MONOCYTES NFR BLD AUTO: 7.2 % (ref 5–12)
NEUTROPHILS NFR BLD AUTO: 3.12 10*3/MM3 (ref 1.7–7)
NEUTROPHILS NFR BLD AUTO: 45.1 % (ref 42.7–76)
NRBC BLD AUTO-RTO: 0 /100 WBC (ref 0–0.2)
PLATELET # BLD AUTO: 315 10*3/MM3 (ref 140–450)
PMV BLD AUTO: 10.8 FL (ref 6–12)
POTASSIUM SERPL-SCNC: 4.3 MMOL/L (ref 3.5–5.2)
PROT SERPL-MCNC: 6.9 G/DL (ref 6–8.5)
RBC # BLD AUTO: 5.09 10*6/MM3 (ref 3.77–5.28)
SODIUM SERPL-SCNC: 141 MMOL/L (ref 136–145)
WBC NRBC COR # BLD AUTO: 6.92 10*3/MM3 (ref 3.4–10.8)

## 2025-07-30 PROCEDURE — 36415 COLL VENOUS BLD VENIPUNCTURE: CPT

## 2025-07-30 PROCEDURE — 83036 HEMOGLOBIN GLYCOSYLATED A1C: CPT

## 2025-07-30 PROCEDURE — 80053 COMPREHEN METABOLIC PANEL: CPT

## 2025-07-30 PROCEDURE — 85025 COMPLETE CBC W/AUTO DIFF WBC: CPT

## (undated) DEVICE — SOL IRRG H2O PL/BG 1000ML STRL

## (undated) DEVICE — Device: Brand: DEFENDO AIR/WATER/SUCTION AND BIOPSY VALVE

## (undated) DEVICE — Device

## (undated) DEVICE — CONN JET HYDRA H20 AUXILIARY DISP

## (undated) DEVICE — GLV SURG BIOGEL LTX PF 7 1/2

## (undated) DEVICE — SOLIDIFIER LIQLOC PLS 1500CC BT

## (undated) DEVICE — SOL IRR NACL 0.9PCT BT 1000ML

## (undated) DEVICE — BLCK/BITE BLOX WO/DENTL/RIM W/STRAP 54F

## (undated) DEVICE — LINER SURG CANSTR SXN S/RIGD 1500CC

## (undated) DEVICE — SINGLE-USE BIOPSY FORCEPS: Brand: RADIAL JAW 4